# Patient Record
Sex: MALE | Race: WHITE | NOT HISPANIC OR LATINO | Employment: FULL TIME | URBAN - METROPOLITAN AREA
[De-identification: names, ages, dates, MRNs, and addresses within clinical notes are randomized per-mention and may not be internally consistent; named-entity substitution may affect disease eponyms.]

---

## 2017-01-09 DIAGNOSIS — I10 ESSENTIAL HYPERTENSION: Primary | ICD-10-CM

## 2017-01-09 RX ORDER — PROPRANOLOL HYDROCHLORIDE 40 MG/1
TABLET ORAL
Qty: 90 TABLET | Refills: 0 | Status: SHIPPED | OUTPATIENT
Start: 2017-01-09 | End: 2017-04-10

## 2017-01-09 NOTE — TELEPHONE ENCOUNTER
Medication is being filled for 1 time refill only due to:  Patient needs to be seen because at last appt MD wanted to see pt for his EPP and to adjust BP meds, etc for his uncontrolled HTN.  Has an appt for 3/2/17 .

## 2017-01-10 ENCOUNTER — TRANSFERRED RECORDS (OUTPATIENT)
Dept: HEALTH INFORMATION MANAGEMENT | Facility: CLINIC | Age: 67
End: 2017-01-10

## 2017-02-17 NOTE — PATIENT INSTRUCTIONS
Preventive Health Recommendations  Male Ages 50 - 64    Yearly exam:             See your health care provider every year in order to  o   Review health changes.   o   Discuss preventive care.    o   Review your medicines if your doctor has prescribed any.     Have a cholesterol test every 5 years, or more frequently if you are at risk for high cholesterol/heart disease.     Have a diabetes test (fasting glucose) every three years. If you are at risk for diabetes, you should have this test more often.     Have a colonoscopy at age 50, or have a yearly FIT test (stool test). These exams will check for colon cancer.      Talk with your health care provider about whether or not a prostate cancer screening test (PSA) is right for you.    You should be tested each year for STDs (sexually transmitted diseases), if you re at risk.     Shots: Get a flu shot each year. Get a tetanus shot every 10 years.     Nutrition:    Eat at least 5 servings of fruits and vegetables daily.     Eat whole-grain bread, whole-wheat pasta and brown rice instead of white grains and rice.     Talk to your provider about Calcium and Vitamin D.     Lifestyle    Exercise for at least 150 minutes a week (30 minutes a day, 5 days a week). This will help you control your weight and prevent disease.     Limit alcohol to one drink per day.     No smoking.     Wear sunscreen to prevent skin cancer.     See your dentist every six months for an exam and cleaning.     See your eye doctor every 1 to 2 years.

## 2017-02-24 DIAGNOSIS — I10 ESSENTIAL HYPERTENSION: ICD-10-CM

## 2017-02-24 RX ORDER — HYDROCHLOROTHIAZIDE 12.5 MG/1
12.5 CAPSULE ORAL DAILY
Qty: 30 CAPSULE | Refills: 0 | Status: SHIPPED | OUTPATIENT
Start: 2017-02-24 | End: 2017-03-22

## 2017-03-02 ENCOUNTER — OFFICE VISIT (OUTPATIENT)
Dept: FAMILY MEDICINE | Facility: CLINIC | Age: 67
End: 2017-03-02

## 2017-03-02 VITALS
TEMPERATURE: 97.9 F | HEIGHT: 68 IN | SYSTOLIC BLOOD PRESSURE: 147 MMHG | OXYGEN SATURATION: 96 % | DIASTOLIC BLOOD PRESSURE: 88 MMHG | BODY MASS INDEX: 40.43 KG/M2 | WEIGHT: 266.75 LBS | HEART RATE: 57 BPM

## 2017-03-02 DIAGNOSIS — Z00.00 PREVENTATIVE HEALTH CARE: Primary | ICD-10-CM

## 2017-03-02 DIAGNOSIS — Z11.59 NEED FOR HEPATITIS C SCREENING TEST: ICD-10-CM

## 2017-03-02 DIAGNOSIS — G25.81 RESTLESS LEGS SYNDROME (RLS): ICD-10-CM

## 2017-03-02 DIAGNOSIS — Z13.21 ENCOUNTER FOR VITAMIN DEFICIENCY SCREENING: ICD-10-CM

## 2017-03-02 DIAGNOSIS — I10 ESSENTIAL HYPERTENSION, BENIGN: ICD-10-CM

## 2017-03-02 DIAGNOSIS — Z12.5 SCREENING FOR PROSTATE CANCER: ICD-10-CM

## 2017-03-02 DIAGNOSIS — R73.09 ELEVATED GLUCOSE: ICD-10-CM

## 2017-03-02 DIAGNOSIS — Z13.220 SCREENING CHOLESTEROL LEVEL: ICD-10-CM

## 2017-03-02 LAB
BUN SERPL-MCNC: 13 MG/DL (ref 7–30)
CALCIUM SERPL-MCNC: 9.1 MG/DL (ref 8.5–10.4)
CHLORIDE SERPLBLD-SCNC: 100 MMOL/L (ref 94–109)
CHOLEST SERPL-MCNC: 170 MG/DL (ref 0–200)
CHOLEST/HDLC SERPL: 4.1 {RATIO} (ref 0–5)
CO2 SERPL-SCNC: 29 MMOL/L (ref 20–32)
CREAT SERPL-MCNC: 0.8 MG/DL (ref 0.8–1.5)
DEPRECATED CALCIDIOL+CALCIFEROL SERPL-MC: 38 UG/L (ref 20–75)
EGFR CALCULATED (BLACK REFERENCE): 125.2
EGFR CALCULATED (NON BLACK REFERENCE): 103.4
FASTING SPECIMEN: YES
FERRITIN SERPL-MCNC: 238 NG/ML (ref 26–388)
GLUCOSE SERPL-MCNC: 121 MG/DL (ref 60–109)
HBA1C MFR BLD: 5.9 % (ref 4.1–5.7)
HCV AB SERPL QL IA: NORMAL
HDLC SERPL-MCNC: 42 MG/DL
LDLC SERPL CALC-MCNC: 99 MG/DL (ref 0–129)
POTASSIUM SERPL-SCNC: 3.6 MMOL/L (ref 3.4–5.3)
PSA SERPL-ACNC: 1.09 UG/L (ref 0–4)
SODIUM SERPL-SCNC: 135 MMOL/L (ref 133–144)
TRIGL SERPL-MCNC: 149 MG/DL (ref 0–150)
VLDL-CHOLESTEROL: 30 (ref 7–32)

## 2017-03-02 ASSESSMENT — PAIN SCALES - GENERAL: PAINLEVEL: SEVERE PAIN (6)

## 2017-03-02 NOTE — PROGRESS NOTES
"CHIEF COMPLAINT:  Physical exam.      HISTORY OF PRESENT ILLNESS:  Dev is a 66-year-old male (turning 67 tomorrow) here for the above.  Overall, he has been doing fairly well.  He has had a workup performed over the last year or so and has some narcolepsy and he is on Provigil.  He is no longer getting drowsy while he is driving and he is finding that at work he is doing much better than he has as well.  He has obstructive sleep apnea and [facility].  He has what sounds like some restless legs symptoms and that his limbs can move and he finds it hard to get into a comfortable position.  I will check a ferritin level just to make sure that that is not contributing.      There were a few other things he wanted to review.  He slipped on the ice a few weeks ago.  He landed on his back.  He still has some pain on the right side near the shoulder blade.  He has had massage therapy and he has been to his chiropractor.  At its worst, the pain is about a 7/10.  Now, it is much better than that but he wanted to mention it to me.      He has a rash near his cheeks and he has a steroid cream at home that he would like to use (hydrocortisone 0.2% cream) and that should be fine.  He has never had a history of rosacea.  I will describe my findings below.      He has a rash in his groin and he suspects it is jock itch.  He had a cream that he bought in [place].  That does not seem to be doing the trick.  Therefore, he is buying something over-the-counter here before going to a prescription medication.      Finally, his wife is concerned about his toenails.  She thinks they look gross.  I will describe my findings below.      HEALTHCARE MAINTENANCE:  He has been going to Ophthalmology and has his eyes checked.  He has been having some slight visual changes, often when he leaves work.  They are very fleeting in nature.  He can have a bit of a \"parentheses\" in the peripheral vision just for " "a second or so before it disappears.  There has been no sign of any retinal tears.  He also sees occasional \"flashes of light,\" and again, there is no evidence of a retinal tear of any kind.  If this ever persists, he let me know and we could certainly do some brain imaging.  His hearing is unchanged.  He qualifies for hearing aids but does not have them.  He knows he does not want to wear them.  I will check his ears for wax today.  Dental care is up to date.  Blood pressure 145/87 initially and went to 148/93 with subsequent check.  He is on hydrochlorothiazide 12.5 mg daily along with Inderal (propranolol) 40 mg daily.  He will continue with that for now but we might need to increase one or both of those medications at some point.  His weight is down intentionally from 269 to 266 over the last 3 months.  He has been drinking less alcohol.  He is on the Provigil (stimulant) so all of these things might be playing into it just a little bit.        REVIEW OF SYSTEMS:  A 10-point review of systems is negative other than the things mentioned above.  Labs pending.  He is up to date with a colonoscopy.  We will do 1-time hepatitis C antibody screen today.      OBJECTIVE:     GENERAL:  Dev is in absolutely no distress.  Breathing comfortably.  He is alert and oriented x3.  Affect upbeat.     VITAL SIGNS:  BP is 145/87 with a pulse of 57 and regular.  Temperature is 97.9.  He is 5 feet 8.3 inches in height and weighs 266 with a BMI of 40.24.  O2 sats are 96% on room air.   HEENT:  Head is normocephalic, atraumatic.  Ears are free of any cerumen.  The TMs look fine.  There is no pain with palpation of the frontal or maxillary sinuses.  Eyes are grossly normal.  Nose is free of any congestion or discharge.  Teeth in good repair.  Mucous membranes are moist.  Throat looks benign.   NECK:  Supple without adenopathy or thyromegaly.  No carotid bruits are heard, no JVD.   BACK:  Smooth and straight.  No pain to percussion.  " "No CVA tenderness.   LUNGS:  Clear to auscultation bilaterally.   HEART:  Regular rate and rhythm without murmur.   ABDOMEN:  Benign.   EXTREMITIES:  Ankles are free of any edema.   SKIN:  Warm and dry.      LABORATORY DATA:  Labs will include vitamin D, PSA 50, lipid panel, hepatitis C antibody, ferritin and a basic metabolic panel.  He will be notified of all those results.      ASSESSMENT AND PLAN:   1.  Adult physical exam, up to date with healthcare maintenance strategies.   2.  Labs have been drawn and the results are pending.  He will be notified of those.     3.  Essential hypertension, benign.  Borderline control.  We might need to increase his medications at some point.   4.  Restless legs-like symptoms.  Ferritin level pending.   5.  Screening for vitamin D deficiency at his request.   6.  One-time hepatitis C antibody screening.   7.  Examination of his back reveals no swelling or ecchymosis where he had the pain in the medial aspect of his right scapula.  Ongoing massage and chiropractic therapy should be fine.   8.  Really no rash seen on his cheeks.  He can use some hydrocortisone 0.2% cream as needed.   9.  Not described above but he does have a classic tinea cruris.  He can certainly try some over-the-counter antifungal \"jock itch\" medication like Tinactin.  If that does not work, we can go with ketoconazole 2% cream or even oral medication.   10.  Toenails really look fine.  The feet are pink.  He has some changes to the great toenails bilaterally.  However, we weighed the pros and cons of going on oral antifungal medication and he would like to avoid that for now.  Should he want to do that down the road, I would be happy to help him with that.  Call with any problems or questions.       "

## 2017-03-02 NOTE — NURSING NOTE
"66 year old  Chief Complaint   Patient presents with     Physical       Blood pressure 145/87, pulse 57, temperature 97.9  F (36.6  C), temperature source Oral, height 5' 8.27\" (173.4 cm), weight 266 lb 12 oz (121 kg), SpO2 96 %. Body mass index is 40.24 kg/(m^2).  Patient Active Problem List   Diagnosis     Hypertension     Obstructive sleep apnea     Preventative health care       Wt Readings from Last 2 Encounters:   03/02/17 266 lb 12 oz (121 kg)   12/14/16 269 lb (122 kg)     BP Readings from Last 3 Encounters:   03/02/17 145/87   12/14/16 (!) 153/91   04/15/16 150/88         Current Outpatient Prescriptions   Medication     hydrochlorothiazide (MICROZIDE) 12.5 MG capsule     propranolol (INDERAL) 40 MG tablet     omeprazole (PRILOSEC) 20 MG capsule     modafinil (PROVIGIL) 100 MG tablet     hydrocortisone (WESTCORT) 0.2 % cream     No current facility-administered medications for this visit.        Social History   Substance Use Topics     Smoking status: Current Some Day Smoker     Years: 8.00     Types: Cigars     Start date: 6/15/2008     Smokeless tobacco: Never Used     Alcohol use Yes      Comment: 3 cigars/week       Health Maintenance Due   Topic Date Due     ADVANCE DIRECTIVE PLANNING Q5 YRS (NO INBASKET)  03/03/1970     HEPATITIS C SCREENING  03/03/1970       No results found for: PAP      March 2, 2017 8:07 AM  "

## 2017-03-02 NOTE — MR AVS SNAPSHOT
After Visit Summary   3/2/2017    Dev Martinez    MRN: [id number]           Patient Information     Date Of Birth          1950        Visit Information        Provider Department      3/2/2017 8:00 AM Behzad Pisano MD [facility]        Today's Diagnoses     Preventative health care    -  1    Need for hepatitis C screening test        Screening cholesterol level        Screening for prostate cancer        Essential hypertension, benign        Restless legs syndrome (RLS)        Encounter for vitamin deficiency screening        Elevated glucose          Care Instructions      Preventive Health Recommendations  Male Ages 50 - 64    Yearly exam:             See your health care provider every year in order to  o   Review health changes.   o   Discuss preventive care.    o   Review your medicines if your doctor has prescribed any.     Have a cholesterol test every 5 years, or more frequently if you are at risk for high cholesterol/heart disease.     Have a diabetes test (fasting glucose) every three years. If you are at risk for diabetes, you should have this test more often.     Have a colonoscopy at age 50, or have a yearly FIT test (stool test). These exams will check for colon cancer.      Talk with your health care provider about whether or not a prostate cancer screening test (PSA) is right for you.    You should be tested each year for STDs (sexually transmitted diseases), if you re at risk.     Shots: Get a flu shot each year. Get a tetanus shot every 10 years.     Nutrition:    Eat at least 5 servings of fruits and vegetables daily.     Eat whole-grain bread, whole-wheat pasta and brown rice instead of white grains and rice.     Talk to your provider about Calcium and Vitamin D.     Lifestyle    Exercise for at least 150 minutes a week (30 minutes a day, 5 days a week). This will help you control your weight and prevent disease.     Limit alcohol to one drink per day.     No  "smoking.     Wear sunscreen to prevent skin cancer.     See your dentist every six months for an exam and cleaning.     See your eye doctor every 1 to 2 years.          Follow-ups after your visit        Who to contact     Please call your clinic at [phone] to:    Ask questions about your health    Make or cancel appointments    Discuss your medicines    Learn about your test results    Speak to your doctor   If you have compliments or concerns about an experience at your clinic, or if you wish to file a complaint, please contact [facility] Patient Relations at [phone] or email us at [email or web address]         Additional Information About Your Visit        [employer] Information     [employer] gives you secure access to your electronic health record. If you see a primary care provider, you can also send messages to your care team and make appointments. If you have questions, please call your primary care clinic.  If you do not have a primary care provider, please call [phone] and they will assist you.      [employer] is an electronic gateway that provides easy, online access to your medical records. With [employer], you can request a clinic appointment, read your test results, renew a prescription or communicate with your care team.     To access your existing account, please contact your [facility] or call [phone] for assistance.        Care EveryWhere ID     This is your Care EveryWhere ID. This could be used by other organizations to access your [place] medical records  [id number]        Your Vitals Were     Pulse Temperature Height Pulse Oximetry BMI (Body Mass Index)       57 97.9  F (36.6  C) (Oral) 5' 8.27\" (173.4 cm) 96% 40.24 kg/m2        Blood Pressure from Last 3 Encounters:   03/02/17 147/88   12/14/16 (!) 153/91   04/15/16 150/88    Weight from Last 3 Encounters:   03/02/17 266 lb 12 oz (121 kg)   12/14/16 269 lb (122 kg) "   04/15/16 262 lb (118.8 kg)              We Performed the Following     Basic Metabolic Panel ([facility])     Ferritin     Hemoglobin A1c ([place])     Hepatitis C Screen Reflex to HCV RNA Quant and Genotype     Lipid Panel ([facility])     Prostate spec antigen screen     Vitamin D Deficiency        Primary Care Provider Office Phone # Fax #    Behazd Pisano [phone]       [place] [id number] [place] [id number]        Thank you!     Thank you for choosing [facility]  for your care. Our goal is always to provide you with excellent care. Hearing back from our patients is one way we can continue to improve our services. Please take a few minutes to complete the written survey that you may receive in the mail after your visit with us. Thank you!             Your Updated Medication List - Protect others around you: Learn how to safely use, store and throw away your medicines at www.disposemymeds.org.          This list is accurate as of: 3/2/17 11:10 AM.  Always use your most recent med list.                   Brand Name Dispense Instructions for use    hydrochlorothiazide 12.5 MG capsule    MICROZIDE    30 capsule    Take 1 capsule (12.5 mg) by mouth daily Keep upcoming appt and need for labs for further refills       hydrocortisone 0.2 % cream    WESTCORT    30 g    Apply sparingly to affected area three times daily for 14 days.       modafinil 100 MG tablet    PROVIGIL     Take 100 mg by mouth daily       omeprazole 20 MG CR capsule    priLOSEC    90 capsule    Take 1 capsule (20 mg) by mouth daily       propranolol 40 MG tablet    INDERAL    90 tablet    Take one-half tab in the AM and one-half tab in the PM.  Keep upcoming appt in March for further refills

## 2017-03-22 DIAGNOSIS — I10 ESSENTIAL HYPERTENSION: ICD-10-CM

## 2017-03-22 RX ORDER — HYDROCHLOROTHIAZIDE 12.5 MG/1
12.5 CAPSULE ORAL DAILY
Qty: 90 CAPSULE | Refills: 3 | Status: SHIPPED | OUTPATIENT
Start: 2017-03-22 | End: 2018-03-02

## 2017-03-22 NOTE — TELEPHONE ENCOUNTER
Prescription approved per [facility] Refill Protocol.  Per Aliya visit note, to stay the course for now with BP meds, with borderline control.

## 2017-04-10 DIAGNOSIS — I10 ESSENTIAL HYPERTENSION: ICD-10-CM

## 2017-04-10 RX ORDER — PROPRANOLOL HYDROCHLORIDE 40 MG/1
TABLET ORAL
Qty: 90 TABLET | Refills: 3 | Status: SHIPPED | OUTPATIENT
Start: 2017-04-10 | End: 2018-04-02

## 2017-05-22 DIAGNOSIS — K21.9 GASTROESOPHAGEAL REFLUX DISEASE WITHOUT ESOPHAGITIS: ICD-10-CM

## 2017-07-13 ENCOUNTER — OFFICE VISIT (OUTPATIENT)
Dept: FAMILY MEDICINE | Facility: CLINIC | Age: 67
End: 2017-07-13

## 2017-07-13 VITALS
HEART RATE: 60 BPM | HEIGHT: 68 IN | DIASTOLIC BLOOD PRESSURE: 80 MMHG | TEMPERATURE: 98.2 F | OXYGEN SATURATION: 97 % | BODY MASS INDEX: 41.22 KG/M2 | SYSTOLIC BLOOD PRESSURE: 144 MMHG | WEIGHT: 272 LBS

## 2017-07-13 DIAGNOSIS — I10 ESSENTIAL HYPERTENSION: ICD-10-CM

## 2017-07-13 DIAGNOSIS — E66.09 NON MORBID OBESITY DUE TO EXCESS CALORIES: ICD-10-CM

## 2017-07-13 DIAGNOSIS — R73.03 PRE-DIABETES: ICD-10-CM

## 2017-07-13 DIAGNOSIS — H90.3 BILATERAL SENSORINEURAL HEARING LOSS: Primary | ICD-10-CM

## 2017-07-13 ASSESSMENT — PAIN SCALES - GENERAL: PAINLEVEL: NO PAIN (0)

## 2017-07-13 NOTE — MR AVS SNAPSHOT
After Visit Summary   7/13/2017    Dev Martinez    MRN: [id number]           Patient Information     Date Of Birth          1950        Visit Information        Provider Department      7/13/2017 10:00 AM Behzad Pisano MD [facility]        Today's Diagnoses     Bilateral sensorineural hearing loss    -  1    Non morbid obesity due to excess calories        Pre-diabetes           Follow-ups after your visit        Additional Services     AUDIOLOGY ADULT REFERRAL       Your provider has referred you to: [facility]: Audiology and Aural Rehab Services - [place] [phone]   [email or web address]    Specialty Testing:  Audiogram Only            NUTRITION REFERRAL       Your provider has referred you to: OTHER PROVIDERS: Jade Haas    Please be aware that coverage of these services is subject to the terms and limitations of your health insurance plan.  Call member services at your health plan with any benefit or coverage questions.      Please bring the following with you to your appointment:    (1) This referral request  (2) Any documents given to you regarding this referral  (3) Any specific questions you have about diet and/or food choices                  Your next 10 appointments already scheduled     Aug 28, 2017  2:20 PM CDT   [facility] with Jade Haas RD   [facility] ([facility])    [place] [address]   [phone]              Who to contact     Please call your clinic at [phone] to:    Ask questions about your health    Make or cancel appointments    Discuss your medicines    Learn about your test results    Speak to your doctor   If you have compliments or concerns about an experience at your clinic, or if you wish to file a complaint, please contact [facility] Patient Relations at [phone] or  "email us at [email or web address]         Additional Information About Your Visit        [employer] Information     [employer] gives you secure access to your electronic health record. If you see a primary care provider, you can also send messages to your care team and make appointments. If you have questions, please call your primary care clinic.  If you do not have a primary care provider, please call [phone] and they will assist you.      [employer] is an electronic gateway that provides easy, online access to your medical records. With [employer], you can request a clinic appointment, read your test results, renew a prescription or communicate with your care team.     To access your existing account, please contact your [facility] or call [phone] for assistance.        Care EveryWhere ID     This is your Care EveryWhere ID. This could be used by other organizations to access your [place] medical records  [id number]        Your Vitals Were     Pulse Temperature Height Pulse Oximetry BMI (Body Mass Index)       60 98.2  F (36.8  C) 5' 8.27\" (173.4 cm) 97% 41.03 kg/m2        Blood Pressure from Last 3 Encounters:   07/13/17 144/80   03/02/17 147/88   12/14/16 (!) 153/91    Weight from Last 3 Encounters:   07/13/17 272 lb (123.4 kg)   03/02/17 266 lb 12 oz (121 kg)   12/14/16 269 lb (122 kg)              We Performed the Following     AUDIOLOGY ADULT REFERRAL     NUTRITION REFERRAL        Primary Care Provider Office Phone # Fax #    Behzad Pisano [phone]       [address] [address]        Equal Access to Services     TULIO LU : Hadii yaritza rosado Sofariba, waaxda luqadaha, qaybta kaalkat lara. So [facility] [phone].    ATENCIÓN: Si habla español, tiene a prasad disposición servicios gratuitos de asistencia lingüística. Krish shepherd [phone].    We comply with " applicable federal civil rights laws and [place] laws. We do not discriminate on the basis of race, color, national origin, age, disability sex, sexual orientation or gender identity.            Thank you!     Thank you for choosing [facility]  for your care. Our goal is always to provide you with excellent care. Hearing back from our patients is one way we can continue to improve our services. Please take a few minutes to complete the written survey that you may receive in the mail after your visit with us. Thank you!             Your Updated Medication List - Protect others around you: Learn how to safely use, store and throw away your medicines at www.disposemymeds.org.          This list is accurate as of: 7/13/17 11:22 AM.  Always use your most recent med list.                   Brand Name Dispense Instructions for use Diagnosis    hydrochlorothiazide 12.5 MG capsule    MICROZIDE    90 capsule    Take 1 capsule (12.5 mg) by mouth daily    Essential hypertension       hydrocortisone 0.2 % cream    WESTCORT    30 g    Apply sparingly to affected area three times daily for 14 days.    Facial rash       modafinil 100 MG tablet    PROVIGIL     Take 100 mg by mouth daily        omeprazole 20 MG CR capsule    priLOSEC    90 capsule    Take 1 capsule (20 mg) by mouth daily    Gastroesophageal reflux disease without esophagitis       propranolol 40 MG tablet    INDERAL    90 tablet    Take one-half tab in the AM and one-half tab in the PM.    Essential hypertension

## 2017-07-13 NOTE — NURSING NOTE
"67 year old  Chief Complaint   Patient presents with     Hearing Problem     on both ears. Wondering if he needs hearing aid.       Blood pressure 154/90, pulse 60, temperature 98.2  F (36.8  C), height 5' 8.27\" (173.4 cm), weight 272 lb (123.4 kg), SpO2 97 %. Body mass index is 41.03 kg/(m^2).  Patient Active Problem List   Diagnosis     Hypertension     Obstructive sleep apnea     Preventative health care       Wt Readings from Last 2 Encounters:   07/13/17 272 lb (123.4 kg)   03/02/17 266 lb 12 oz (121 kg)     BP Readings from Last 3 Encounters:   07/13/17 154/90   03/02/17 147/88   12/14/16 (!) 153/91         Current Outpatient Prescriptions   Medication     omeprazole (PRILOSEC) 20 MG CR capsule     propranolol (INDERAL) 40 MG tablet     hydrochlorothiazide (MICROZIDE) 12.5 MG capsule     modafinil (PROVIGIL) 100 MG tablet     hydrocortisone (WESTCORT) 0.2 % cream     No current facility-administered medications for this visit.        Social History   Substance Use Topics     Smoking status: Current Some Day Smoker     Years: 8.00     Types: Cigars     Start date: 6/15/2008     Smokeless tobacco: Never Used     Alcohol use Yes      Comment: 3 cigars/week       Health Maintenance Due   Topic Date Due     ADVANCE DIRECTIVE PLANNING Q5 YRS  03/03/1970     FALL RISK ASSESSMENT  03/03/2017     PNEUMOCOCCAL (1 of 2 - PCV13) 03/03/2017     AORTIC ANEURYSM SCREENING (SYSTEM ASSIGNED)  03/03/2017       No results found for: KRISTINE Ferguson CMA  July 13, 2017 10:00 AM  "

## 2017-07-19 NOTE — PROGRESS NOTES
"  CHIEF COMPLAINT:  Hearing loss.    HISTORY OF PRESENT ILLNESS:  Dev Martinez is a 67-year-old gentleman here today to review his hearing situation.  We had addressed this 2 years ago and things have gotten a bit worse.  At the time, he was really not willing to do a hearing aid, but he is much more open to that now.  The overall process has been one of gradual worsening.  He is saying \"what?\" a lot more than he used to.  His wife is certainly pointing this out to him.      In addition to that, he has \"slight forgetfulness\" and some other \"oddities.\"  For example, he has a 7-day pill container and he takes 4 medications per day.  He will fill them, but then as he looks at them it does not really make sense.  He has to really think about how many are in each slot.  Before, this was automatic and he could glance at it without having to think about it too much.    His weight is up and that is the other thing he wanted to mention.  He is not as active as he once was.  He is open to meeting with a nutritionist and will be happy to meet with Jade Haas in our clinic.  Active medical problems include essential hypertension, obstructive sleep apnea, and he has been quite good about coming in for routine preventive care.  Current medications reviewed.  Habits and family history reviewed.    OBJECTIVE:  Dev is in no distress.  Affect is upbeat.  BP initially is 154/90 but upon recheck it dropped down to 144/80.  Weight loss will certainly help in this regard.  Pulse is 60 and regular.  Temperature is 98.2.  He is 5 feet 8.3 inches in height and weighs 272 with a BMI of 41.03.  O2 sats are 97% on room air.  Examination of the ears reveals no wax.  Eardrums look fine.  I went back in the chart and reviewed previous workup that was done just a little over 2 years ago.  He had high frequency sensorineural hearing loss, but there was a bit of a discrepancy from left to the right ear.  Therefore, MRI was ordered which showed no " evidence of an acoustic neuroma.  Recommendation was to consider hearing aids and have annual hearing reevaluation.  No other physical exam performed today.    ASSESSMENT AND PLAN:    1.  Bilateral hearing loss, mild asymmetry noted.  Worse over the last 2 years.  Audiology appointment obtained 2 years ago and followed through.  MRI was obtained.  No acoustic neuroma.  Therefore, we are simply going to recommend that he go back to Audiology, have another hearing exam, and then get referrals to clinics that can provide with hearing aids.  He is ready for that and is willing to do that now.   2.  Weight gain, morbid obesity, some forgetfulness and other issues.  I am going to recommend a Nutrition consultation as this will certainly help him in many regards.  3.  Elevated blood pressure today indicating mostly controlled hypertension.  I would love to see the systolic less than 140.  Losing some weight and becoming more physically active should help us to get to that point.  4.  He will call with any problems or questions.        Behzad Pisano MD    D:  07/13/2017 18:52 T:  07/18/2017 21:07  Document:  [id number] \

## 2017-08-09 ENCOUNTER — DOCUMENTATION ONLY (OUTPATIENT)
Dept: VASCULAR SURGERY | Facility: CLINIC | Age: 67
End: 2017-08-09

## 2017-08-09 DIAGNOSIS — Z72.0 CURRENT TOBACCO USE: Primary | ICD-10-CM

## 2017-08-09 DIAGNOSIS — Z13.6 ENCOUNTER FOR ABDOMINAL AORTIC ANEURYSM (AAA) SCREENING: Primary | ICD-10-CM

## 2017-08-28 ENCOUNTER — OFFICE VISIT (OUTPATIENT)
Dept: FAMILY MEDICINE | Facility: CLINIC | Age: 67
End: 2017-08-28

## 2017-08-28 DIAGNOSIS — Z71.3 DIETARY COUNSELING: Primary | ICD-10-CM

## 2017-08-28 DIAGNOSIS — E66.9 OBESITY, UNSPECIFIED: ICD-10-CM

## 2017-08-28 NOTE — MR AVS SNAPSHOT
After Visit Summary   8/28/2017    Dev Martinez    MRN: [id number]           Patient Information     Date Of Birth          1950        Visit Information        Provider Department      8/28/2017 2:20 PM Jade Haas RD [facility]        Today's Diagnoses     Dietary counseling    -  1    Obesity, unspecified           Follow-ups after your visit        Your next 10 appointments already scheduled     Sep 15, 2017  5:30 PM CDT   NUTRITION VISIT with Jade Haas RD   [facility] Urology and [facility] for Prostate and Urologic Cancers ([facility])    [address] [place] [id number]   [phone]            Oct 06, 2017  5:00 PM CDT   NUTRITION VISIT with Jade Haas RD   [facility] Urology and [facility] for Prostate and Urologic Cancers ([facility])    [address] [place] [address]   [phone]            Oct 23, 2017  4:40 PM CDT   Return Nurtrition Jade with Jade Haas RD   [facility] ([facility])    [address] [address]   [phone]            Nov 06, 2017  4:40 PM CST   Return Nurtrition Jade with Jade Haas RD   [facility] ([facility])    [address]  [facility] [address]   [phone]            Nov 20, 2017  4:40 PM CST   Return Nurtrition Jade with Jade Haas RD   [facility] ([facility])    [address] [address]   [phone]              Who to contact     Please call your clinic at [phone] to:    Ask questions about your health    Make or cancel appointments    Discuss your medicines    Learn about your test results    Speak to your doctor   If you have compliments or concerns about an experience at your clinic, or if you wish to file a complaint, please contact [facility]  [place] [facility] Patient Relations at [phone] or email us at [email or web address]         Additional Information About Your Visit        Miiixhart Information     Miiixhart gives you secure access to your electronic health record. If you see a primary care provider, you can also send messages to your care team and make appointments. If you have questions, please call your primary care clinic.  If you do not have a primary care provider, please call [phone] and they will assist you.      InnoPad is an electronic gateway that provides easy, online access to your medical records. With InnoPad, you can request a clinic appointment, read your test results, renew a prescription or communicate with your care team.     To access your existing account, please contact your [facility] or call [phone] for assistance.        Care EveryWhere ID     This is your Care EveryWhere ID. This could be used by other organizations to access your [place] medical records  [id number]         Blood Pressure from Last 3 Encounters:   07/13/17 144/80   03/02/17 147/88   12/14/16 (!) 153/91    Weight from Last 3 Encounters:   07/13/17 123.4 kg (272 lb)   03/02/17 121 kg (266 lb 12 oz)   12/14/16 122 kg (269 lb)              We Performed the Following     MNT INDIVIDUAL INITIAL EA 15 MIN        Primary Care Provider Office Phone # Fax #    Behzad Pisano [phone]        [address] [place] [address]        Equal Access to Services     TULIO LU : Hadii aad ku hadasho Soomaali, waaxda luqadaha, qaybta kaalmada adeegyada, waxana garett cruz . So [facility] [phone].    ATENCIÓN: Si habla español, tiene a prasad disposición servicios gratuitos de asistencia lingüística. Llame al [phone].    We comply with applicable federal civil rights laws and [place] laws. We do not discriminate on the basis of race, color, national origin,  age, disability sex, sexual orientation or gender identity.            Thank you!     Thank you for choosing [facility]  for your care. Our goal is always to provide you with excellent care. Hearing back from our patients is one way we can continue to improve our services. Please take a few minutes to complete the written survey that you may receive in the mail after your visit with us. Thank you!             Your Updated Medication List - Protect others around you: Learn how to safely use, store and throw away your medicines at www.disposemymeds.org.          This list is accurate as of: 8/28/17  3:12 PM.  Always use your most recent med list.                   Brand Name Dispense Instructions for use Diagnosis    hydrochlorothiazide 12.5 MG capsule    MICROZIDE    90 capsule    Take 1 capsule (12.5 mg) by mouth daily    Essential hypertension       hydrocortisone 0.2 % cream    WESTCORT    30 g    Apply sparingly to affected area three times daily for 14 days.    Facial rash       modafinil 100 MG tablet    PROVIGIL     Take 100 mg by mouth daily        omeprazole 20 MG CR capsule    priLOSEC    90 capsule    Take 1 capsule (20 mg) by mouth daily    Gastroesophageal reflux disease without esophagitis       propranolol 40 MG tablet    INDERAL    90 tablet    Take one-half tab in the AM and one-half tab in the PM.    Essential hypertension

## 2017-08-28 NOTE — PROGRESS NOTES
Referring provider: Self    Dev Martinez  is a 67 year old male presents today for new nutrition consultation.  He would love to work on weight loss since he has been gaining weight gradually.  He has sedentary work.     Vitals:  There were no vitals taken for this visit.   Wt Readings from Last 4 Encounters:   07/13/17 123.4 kg (272 lb)   03/02/17 121 kg (266 lb 12 oz)   12/14/16 122 kg (269 lb)   04/15/16 118.8 kg (262 lb)       Nutrition History  Patient is on a regular  diet at home.  Recall:  Breakfast: 7:00:  dry cereal with milk or fresh scrambled eggs, 2 strips of lorenzo, cubed potatoes or breakfast sandwich on croissant or english muffin   Snack: 9:30:  1 cup of tea with milk and granola bar  Lunch: 11:30:  Lopez vegetarian, chocolate  Snack: 13:45-14:30:  Fresh cut fruit  Dinner: 17:30:  [place] turkey sandwich, [place] bread, guevara, sweet corn (2 ears) or pasta with home made sauce and 1 glass of wine  Snack: ice cream  (1/4c)   Beverages: water, tea.  ETOH (1 drink = 12 oz beer, 5 oz wine, 1.5 oz liquor): beer or wine 5-6 times per week.   Eating out: breakfast and sometimes lunch at work.     Physical Activity  Not much.  Have standing desk, but do not use it.      Time with Patient:  30 Minutes    Nutrition  DX:.   1. Dietary counseling    2. Obesity, unspecified          Nutrition Goals:   1:  Start tracking food using picture food journal  2:  Educated on execrations and also philosophy to improve his health via nutrition.     Long term goals:  1:  Initial weight loss goal to lose 5-10% weight via lifestyle changes.             Jade Haas, MS, RD, CSSD, LD  [facility]

## 2017-09-15 ENCOUNTER — ALLIED HEALTH/NURSE VISIT (OUTPATIENT)
Dept: UROLOGY | Facility: CLINIC | Age: 67
End: 2017-09-15

## 2017-09-15 DIAGNOSIS — Z71.3 DIETARY COUNSELING: Primary | ICD-10-CM

## 2017-09-15 DIAGNOSIS — E66.9 OBESITY, UNSPECIFIED: ICD-10-CM

## 2017-09-15 NOTE — MR AVS SNAPSHOT
After Visit Summary   9/15/2017    Dev Martinez    MRN: [id number]           Patient Information     Date Of Birth          1950        Visit Information        Provider Department      9/15/2017 5:30 PM Jade Haas RD [facility] Urology and [facility] for Prostate and Urologic Cancers        Today's Diagnoses     Dietary counseling    -  1    Obesity, unspecified           Follow-ups after your visit        Your next 10 appointments already scheduled     Oct 06, 2017  5:00 PM CDT   NUTRITION VISIT with RADHAMES Stevenson [facility] Urology and [facility] for Prostate and Urologic Cancers ([facility])    [address] [place] [address]  4th [facility] [address]   [phone]            Oct 23, 2017  4:40 PM CDT   Return Nurtrition Jade with Jade Haas RD   [facility] ([facility])    [address]  [facility] [address]   [phone]            Nov 06, 2017  4:40 PM CST   Return Nurtrition Jade with Jade Haas RD   [facility] ([facility])    [address], [facility] [address]  [facility] [address]   [phone]            Nov 20, 2017  4:40 PM CST   Return Nurtrition Jade with Jade Haas RD   [facility] ([facility])    [address], [facility] [address]  [facility] [address] [phone]              Who to contact     Please call your clinic at [phone] to:    Ask questions about your health    Make or cancel appointments    Discuss your medicines    Learn about your test results    Speak to your doctor   If you have compliments or concerns about an experience at your clinic, or if you wish to file a complaint, please contact [facility] Patient Relations at [phone] or email us at [email or web address]         Additional Information About Your Visit        MyChart Information     Judahhart gives you secure  access to your electronic health record. If you see a primary care provider, you can also send messages to your care team and make appointments. If you have questions, please call your primary care clinic.  If you do not have a primary care provider, please call [phone] and they will assist you.      [employer] is an electronic gateway that provides easy, online access to your medical records. With [employer], you can request a clinic appointment, read your test results, renew a prescription or communicate with your care team.     To access your existing account, please contact your [facility] or call [phone] for assistance.        Care EveryWhere ID     This is your Care EveryWhere ID. This could be used by other organizations to access your [place] medical records  [id number]         Blood Pressure from Last 3 Encounters:   07/13/17 144/80   03/02/17 147/88   12/14/16 (!) 153/91    Weight from Last 3 Encounters:   07/13/17 123.4 kg (272 lb)   03/02/17 121 kg (266 lb 12 oz)   12/14/16 122 kg (269 lb)              We Performed the Following     MNT INDIVIDUAL F/U REASSESS, EA 15 MIN        Primary Care Provider Office Phone # Fax #    Behzad Pisano [phone]       [address] [place] [address]        Equal Access to Services     [facility]: Hadii aad ku hadasho Soomaali, waaxda luqadaha, qaybta kaalmada adeegyada, waxana bajwa haycole cruz . So [facility] [phone].    ATENCIÓN: Si habla español, tiene a prasad disposición servicios gratuitos de asistencia lingüística. Llame al [phone].    We comply with applicable federal civil rights laws and [place] laws. We do not discriminate on the basis of race, color, national origin, age, disability sex, sexual orientation or gender identity.            Thank you!     Thank you for choosing [facility] UROLOGY AND [facility] FOR PROSTATE AND UROLOGIC CANCERS  for your care. Our goal is always  to provide you with excellent care. Hearing back from our patients is one way we can continue to improve our services. Please take a few minutes to complete the written survey that you may receive in the mail after your visit with us. Thank you!             Your Updated Medication List - Protect others around you: Learn how to safely use, store and throw away your medicines at www.disposemymeds.org.          This list is accurate as of: 9/15/17  6:04 PM.  Always use your most recent med list.                   Brand Name Dispense Instructions for use Diagnosis    hydrochlorothiazide 12.5 MG capsule    MICROZIDE    90 capsule    Take 1 capsule (12.5 mg) by mouth daily    Essential hypertension       hydrocortisone 0.2 % cream    WESTCORT    30 g    Apply sparingly to affected area three times daily for 14 days.    Facial rash       modafinil 100 MG tablet    PROVIGIL     Take 100 mg by mouth daily        omeprazole 20 MG CR capsule    priLOSEC    90 capsule    Take 1 capsule (20 mg) by mouth daily    Gastroesophageal reflux disease without esophagitis       propranolol 40 MG tablet    INDERAL    90 tablet    Take one-half tab in the AM and one-half tab in the PM.    Essential hypertension

## 2017-09-15 NOTE — PROGRESS NOTES
Referring provider: Self    Dev Martinez  is a 67 year old male presents today for new nutrition consultation.  He would love to work on weight loss since he has been gaining weight gradually.  He has sedentary work.     Vitals:  There were no vitals taken for this visit.   Wt Readings from Last 4 Encounters:   07/13/17 123.4 kg (272 lb)   03/02/17 121 kg (266 lb 12 oz)   12/14/16 122 kg (269 lb)   04/15/16 118.8 kg (262 lb)       Nutrition History  Patient is on a regular  diet at home.  Recall:  Breakfast: 7:30: Scrambled eggs, ham, onions, swiss cheese OR raisin bran with milk  Snack: 7:45:  1 cup mixed fruits  Lunch: 11:50:  Asian chicken (breaded) 4-6 oz, with grain topping, 1/2 cup rice, 1/2 cup mixed veggies   Snack: 14:30:  Fresh cut fruit 1 cup  Dinner: 17:30:  Spaghetti 1.5 cup cooked and meatball/mushroom sauce 1 cup, with cheese 1/8 cup parmesan, and bread 1 sl, and 1 glass wine  Snack: pumpkin pie and whip cream 1 sl  Beverages: water, tea.  ETOH (1 drink = 12 oz beer, 5 oz wine, 1.5 oz liquor): beer or wine 5-6 times per week.   Eating out: breakfast and sometimes lunch at work.     Read the meal composition handout, so he has been trying to eat more vegetables. Eats scheduled lunch from 11:00-12:30 pm. States he eats later during the weekends. States he snacks less at work. States he is trying to eat more salad at work, but typically eats asian food with rice.    Physical Activity  Not much.  Have standing desk, but do not use it.   Started standing at desk (9/15) a few times a day x30-60 mins total.   Started walking (9/15) 45 minutes a night. (Tues/Weds/Thurs this week and at work)  Time with Patient:  30 Minutes    Nutrition  DX:.   No diagnosis found.      Nutrition Goals:   1:  Continue tracking food using picture food journal  2:  Provided breakfast ideas to eat every day.   3:  Decrease grain content to one palm per meal, and increase fruit/veggie content to two fists per meal  4: Measure  weight next time    Long term goals:  1:  Initial weight loss goal to lose 5-10% weight via lifestyle changes.             Jade Haas, MS, RD, CSSD, LD  [facility]

## 2017-10-06 ENCOUNTER — ALLIED HEALTH/NURSE VISIT (OUTPATIENT)
Dept: UROLOGY | Facility: CLINIC | Age: 67
End: 2017-10-06

## 2017-10-06 VITALS — WEIGHT: 270.7 LBS | BODY MASS INDEX: 41.03 KG/M2 | HEIGHT: 68 IN

## 2017-10-06 DIAGNOSIS — Z71.3 DIETARY COUNSELING: Primary | ICD-10-CM

## 2017-10-06 DIAGNOSIS — E66.9 OBESITY, UNSPECIFIED OBESITY SEVERITY, UNSPECIFIED OBESITY TYPE: ICD-10-CM

## 2017-10-06 NOTE — MR AVS SNAPSHOT
After Visit Summary   10/6/2017    Dev Martinez    MRN: [id number]           Patient Information     Date Of Birth          1950        Visit Information        Provider Department      10/6/2017 5:00 PM Jade Haas RD [facility]rology and [facility] for Prostate and Urologic Cancers        Today's Diagnoses     Dietary counseling    -  1    Obesity, unspecified obesity severity, unspecified obesity type           Follow-ups after your visit        Your next 10 appointments already scheduled     Oct 23, 2017  4:40 PM CDT   Return Nurtrition Jade with Jade Haas RD   [facility] ([facility])    [place] [address]  [facility] [address]   [phone]            Nov 06, 2017  4:40 PM CST   Return Nurtrition Jade with Jade Haas RD   [facility] ([facility])    [facility]  [address]  [facility] [address]   [phone]            Nov 20, 2017  4:40 PM CST   Return Nurtrition Jade with Jade Haas RD   [facility] ([facility])    [facility]  [address]  [facility] [address]   [phone]              Who to contact     Please call your clinic at [phone] to:    Ask questions about your health    Make or cancel appointments    Discuss your medicines    Learn about your test results    Speak to your doctor   If you have compliments or concerns about an experience at your clinic, or if you wish to file a complaint, please contact [facility] Patient Relations at [phone] or email us at [email or web address]         Additional Information About Your Visit        MyChart Information     Night Outt gives you secure access to your electronic health record. If you see a primary care provider, you can also send messages to your care team and make appointments. If you have questions, please call your primary care clinic.   "If you do not have a primary care provider, please call [phone] and they will assist you.      MiNOWireless is an electronic gateway that provides easy, online access to your medical records. With MiNOWireless, you can request a clinic appointment, read your test results, renew a prescription or communicate with your care team.     To access your existing account, please contact your [facility] or call [phone] for assistance.        Care EveryWhere ID     This is your Care EveryWhere ID. This could be used by other organizations to access your [place] medical records  [id number]        Your Vitals Were     Height BMI (Body Mass Index)                1.734 m (5' 8.27\") 40.84 kg/m2           Blood Pressure from Last 3 Encounters:   07/13/17 144/80   03/02/17 147/88   12/14/16 (!) 153/91    Weight from Last 3 Encounters:   10/06/17 122.8 kg (270 lb 11.2 oz)   07/13/17 123.4 kg (272 lb)   03/02/17 121 kg (266 lb 12 oz)              We Performed the Following     MNT INDIVIDUAL F/U REASSESS, EA 15 MIN        Primary Care Provider Office Phone # Fax #    Behzad Pisano -[phone]       [address] [place] [address]        Equal Access to Services     LEIA LU : Hadii yaritza ku hadasho Soomaali, waaxda luqadaha, qaybta kaalmada adeegyada, kat bajwa haycole cruz . So [facility] [phone].    ATENCIÓN: Si habla español, tiene a prasad disposición servicios gratuitos de asistencia lingüística. Llame al [phone].    We comply with applicable federal civil rights laws and [place] laws. We do not discriminate on the basis of race, color, national origin, age, disability, sex, sexual orientation, or gender identity.            Thank you!     Thank you for choosing [facility] UROLOGY AND [facility] FOR PROSTATE AND UROLOGIC CANCERS  for your care. Our goal is always to provide you with excellent care. Hearing back from our patients is one way we can " continue to improve our services. Please take a few minutes to complete the written survey that you may receive in the mail after your visit with us. Thank you!             Your Updated Medication List - Protect others around you: Learn how to safely use, store and throw away your medicines at www.disposemymeds.org.          This list is accurate as of: 10/6/17 10:54 PM.  Always use your most recent med list.                   Brand Name Dispense Instructions for use Diagnosis    hydrochlorothiazide 12.5 MG capsule    MICROZIDE    90 capsule    Take 1 capsule (12.5 mg) by mouth daily    Essential hypertension       hydrocortisone 0.2 % cream    WESTCORT    30 g    Apply sparingly to affected area three times daily for 14 days.    Facial rash       modafinil 100 MG tablet    PROVIGIL     Take 100 mg by mouth daily        omeprazole 20 MG CR capsule    priLOSEC    90 capsule    Take 1 capsule (20 mg) by mouth daily    Gastroesophageal reflux disease without esophagitis       propranolol 40 MG tablet    INDERAL    90 tablet    Take one-half tab in the AM and one-half tab in the PM.    Essential hypertension

## 2017-10-06 NOTE — PROGRESS NOTES
"Referring provider: Self    Dev Martinez  is a 67 year old male presents today for follow-up nutrition consultation.   Vitals:  Ht 1.734 m (5' 8.27\")  Wt 122.8 kg (270 lb 11.2 oz)  BMI 40.84 kg/m2   Wt Readings from Last 4 Encounters:   10/06/17 122.8 kg (270 lb 11.2 oz)   07/13/17 123.4 kg (272 lb)   03/02/17 121 kg (266 lb 12 oz)   12/14/16 122 kg (269 lb)       Nutrition History  Patient is on a regular  diet at home.  Recall:  Breakfast: 7:30: Scrambled eggs, ham, onions, swiss cheese, fruit  Snack: 9:45:  scone  Lunch: 11:50:  3 chicken wings, rice (1c) and green beans.   Snack: 14:30:  Pop corn  Dinner: 17:30: 1 cup of soup  Snack: ice cream (1/4c)  Beverages: water, tea.  ETOH (1 drink = 12 oz beer, 5 oz wine, 1.5 oz liquor): beer or wine 5-6 times per week.   Eating out: breakfast and sometimes lunch at work.       Physical Activity  Walking \"not very far\".    But standing on the desk more.     Time with Patient:  30 Minutes    Nutrition  DX:.   1. Dietary counseling    2. Obesity, unspecified obesity severity, unspecified obesity type          Nutrition Goals:   1:  Continue tracking food using picture food journal  2:  Will be modifying his breakfasts and lunches to help his to lose weight.       Long term goals:  1:  Initial weight loss goal to lose 5-10% weight via lifestyle changes.             Jade Haas, MS, RD, CSSD, LD  [facility]       "

## 2017-10-23 ENCOUNTER — OFFICE VISIT (OUTPATIENT)
Dept: FAMILY MEDICINE | Facility: CLINIC | Age: 67
End: 2017-10-23

## 2017-10-23 VITALS — WEIGHT: 271 LBS | BODY MASS INDEX: 40.88 KG/M2

## 2017-10-23 DIAGNOSIS — E66.9 OBESITY: ICD-10-CM

## 2017-10-23 DIAGNOSIS — Z71.3 DIETARY COUNSELING: Primary | ICD-10-CM

## 2017-10-23 NOTE — MR AVS SNAPSHOT
After Visit Summary   10/23/2017    Dev Martinez    MRN: [id number]           Patient Information     Date Of Birth          1950        Visit Information        Provider Department      10/23/2017 4:40 PM Jade Haas RD [facility]        Today's Diagnoses     Dietary counseling    -  1    Obesity           Follow-ups after your visit        Your next 10 appointments already scheduled     Nov 06, 2017  4:40 PM CST   Return Nurtrition Jade with Jade Haas RD   [facility] ([facility])    [place] [address] [address] [address]  [facility] [address]   [phone]            Nov 20, 2017  4:40 PM CST   Return Nurtrition Jade with Jade Haas RD   [facility] ([facility])    [place] [address] [facility] [address]  [facility] [address]   [phone]              Who to contact     Please call your clinic at [phone] to:    Ask questions about your health    Make or cancel appointments    Discuss your medicines    Learn about your test results    Speak to your doctor   If you have compliments or concerns about an experience at your clinic, or if you wish to file a complaint, please contact [facility] Patient Relations at [phone] or email us at [email or web address]         Additional Information About Your Visit        MyChart Information     [employer] gives you secure access to your electronic health record. If you see a primary care provider, you can also send messages to your care team and make appointments. If you have questions, please call your primary care clinic.  If you do not have a primary care provider, please call [phone] and they will assist you.      [employer] is an electronic gateway that provides easy, online access to your medical records. With [employer], you can request a clinic appointment, read your test results, renew a prescription or communicate with your care  team.     To access your existing account, please contact your [facility] or call [phone] for assistance.        Care EveryWhere ID     This is your Care EveryWhere ID. This could be used by other organizations to access your [place] medical records  [id number]        Your Vitals Were     BMI (Body Mass Index)                   40.88 kg/m2            Blood Pressure from Last 3 Encounters:   07/13/17 144/80   03/02/17 147/88   12/14/16 (!) 153/91    Weight from Last 3 Encounters:   10/23/17 122.9 kg (271 lb)   10/06/17 122.8 kg (270 lb 11.2 oz)   07/13/17 123.4 kg (272 lb)              We Performed the Following     MNT INDIVIDUAL F/U REASSESS, EA 15 MIN        Primary Care Provider Office Phone # Fax #    Behzad Pisano [phone]       [address] [place] [address]        Equal Access to Services     [facility] : Hadii aad ku hadasho Soomaali, waaxda luqadaha, qaybta kaalmada adeegyada, waxay pepein zachary cruz . So [facility] [phone].    ATENCIÓN: Si habla español, tiene a prasad disposición servicios gratuitos de asistencia lingüística. Llame al [phone].    We comply with applicable federal civil rights laws and [place] laws. We do not discriminate on the basis of race, color, national origin, age, disability, sex, sexual orientation, or gender identity.            Thank you!     Thank you for choosing [facility]  for your care. Our goal is always to provide you with excellent care. Hearing back from our patients is one way we can continue to improve our services. Please take a few minutes to complete the written survey that you may receive in the mail after your visit with us. Thank you!             Your Updated Medication List - Protect others around you: Learn how to safely use, store and throw away your medicines at www.disposemymeds.org.          This list is accurate as of: 10/23/17  5:05 PM.  Always use your  most recent med list.                   Brand Name Dispense Instructions for use Diagnosis    hydrochlorothiazide 12.5 MG capsule    MICROZIDE    90 capsule    Take 1 capsule (12.5 mg) by mouth daily    Essential hypertension       hydrocortisone 0.2 % cream    WESTCORT    30 g    Apply sparingly to affected area three times daily for 14 days.    Facial rash       modafinil 100 MG tablet    PROVIGIL     Take 100 mg by mouth daily        omeprazole 20 MG CR capsule    priLOSEC    90 capsule    Take 1 capsule (20 mg) by mouth daily    Gastroesophageal reflux disease without esophagitis       propranolol 40 MG tablet    INDERAL    90 tablet    Take one-half tab in the AM and one-half tab in the PM.    Essential hypertension

## 2017-10-23 NOTE — PROGRESS NOTES
"Referring provider: Self    Dev LISA Martinez  is a 67 year old male presents today for follow-up nutrition consultation.   Vitals:  Wt 122.9 kg (271 lb)  BMI 40.88 kg/m2   Wt Readings from Last 4 Encounters:   10/23/17 122.9 kg (271 lb)   10/06/17 122.8 kg (270 lb 11.2 oz)   07/13/17 123.4 kg (272 lb)   03/02/17 121 kg (266 lb 12 oz)       Nutrition History  Patient is on a regular  diet at home.  Recall:  Breakfast: 7:30: Scrambled eggs, ham, onions, swiss cheese, fruit OR cereal OR fruit yogurt and granola  Snack: 9:45:  scone  Lunch: 11:50:  3 chicken wings, rice (1c) and green beans OR turkey/roast beef with sauce, green beans  Snack: 14:30:  Pop corn  Dinner: 17:30: Chicken caesar salad, \"a bunch\" french fries OR Soup with chicken sausage, carrots, rutabagas, carrots, potatoes, parsnips, corn, canoli, tomato, chicken broth OR sauteed chicken normandy with parsnips, peas, mashed potatoes  Snack: ice cream (1/4c)  Beverages: water, tea.  ETOH (1 drink = 12 oz beer, 5 oz wine, 1.5 oz liquor): beer or wine 5-6 times per week.   Eating out: breakfast and sometimes lunch at work.     States he has been trying to eat more vegetables, limit grains, and have smaller portions. States he has \"no willpower\", wants his wife to help him make healthier choices. States he hasn't been good about taking pictures lately because he forgets to take them.    Physical Activity  Walking \"not very far\".    But standing on the desk more.     10/23/17: States he is walking 45 minutes per day.       Time with Patient:  30 Minutes    Nutrition  DX:.   1. Dietary counseling    2. Obesity          Nutrition Goals:   1:  Continue tracking food using picture food journal  2:  Have at least 2 cups of veggie/fruit per meal. (Fruit for breakfast, vegetable for lunch and dinner)   3: Recommend reading willpower instinct or secrets from the eating lab.   4: Encouraged that all foods can be part of your weight loss. No foods are special, and you do " not need to completely eliminate any food from your diet.     Long term goals:  1:  Initial weight loss goal to lose 5-10% weight via lifestyle changes.             Jade Haas, MS, RD, CSSD, LD  [facility]

## 2017-11-06 ENCOUNTER — OFFICE VISIT (OUTPATIENT)
Dept: FAMILY MEDICINE | Facility: CLINIC | Age: 67
End: 2017-11-06

## 2017-11-06 VITALS — WEIGHT: 270.25 LBS | HEIGHT: 68 IN | BODY MASS INDEX: 40.96 KG/M2

## 2017-11-06 DIAGNOSIS — Z71.3 DIETARY COUNSELING: Primary | ICD-10-CM

## 2017-11-06 DIAGNOSIS — E66.9 OBESITY: ICD-10-CM

## 2017-11-06 NOTE — MR AVS SNAPSHOT
After Visit Summary   11/6/2017    Dev Martinez    MRN: [id number]           Patient Information     Date Of Birth          1950        Visit Information        Provider Department      11/6/2017 4:40 PM Jade Haas RD [facility]        Today's Diagnoses     Dietary counseling    -  1    Obesity           Follow-ups after your visit        Your next 10 appointments already scheduled     Nov 20, 2017  4:40 PM CST   Return Nurtrition Jade with Jade Haas RD   [facility] ([facility])    [address] [address]   [phone]              Who to contact     Please call your clinic at [phone] to:    Ask questions about your health    Make or cancel appointments    Discuss your medicines    Learn about your test results    Speak to your doctor   If you have compliments or concerns about an experience at your clinic, or if you wish to file a complaint, please contact [facility] Patient Relations at [phone] or email us at [email or web address]         Additional Information About Your Visit        MyChart Information     [employer] gives you secure access to your electronic health record. If you see a primary care provider, you can also send messages to your care team and make appointments. If you have questions, please call your primary care clinic.  If you do not have a primary care provider, please call [phone] and they will assist you.      [employer] is an electronic gateway that provides easy, online access to your medical records. With [employer], you can request a clinic appointment, read your test results, renew a prescription or communicate with your care team.     To access your existing account, please contact your [facility] or call [phone] for assistance.        Care EveryWhere ID     This is your Care EveryWhere ID. This could be used  "by other organizations to access your [place] medical records  [id number]        Your Vitals Were     Height BMI (Body Mass Index)                5' 8.27\" (173.4 cm) 40.77 kg/m2           Blood Pressure from Last 3 Encounters:   07/13/17 144/80   03/02/17 147/88   12/14/16 (!) 153/91    Weight from Last 3 Encounters:   11/06/17 270 lb 4 oz (122.6 kg)   10/23/17 271 lb (122.9 kg)   10/06/17 270 lb 11.2 oz (122.8 kg)              We Performed the Following     MNT INDIVIDUAL F/U REASSESS, EA 15 MIN        Primary Care Provider Office Phone # Fax #    Behzad Pisano [phone] [address] [place] [address]        Equal Access to Services     [facility] TABITHA : Hadii yaritza guerrier hadasho Sofariba, waaxda luqadaha, qaybta kaalmada adeelieyaradha, kat cruz . So [facility] [phone].    ATENCIÓN: Si habla español, tiene a prasad disposición servicios gratuitos de asistencia lingüística. Krish al [phone].    We comply with applicable federal civil rights laws and [place] laws. We do not discriminate on the basis of race, color, national origin, age, disability, sex, sexual orientation, or gender identity.            Thank you!     Thank you for choosing [facility]  for your care. Our goal is always to provide you with excellent care. Hearing back from our patients is one way we can continue to improve our services. Please take a few minutes to complete the written survey that you may receive in the mail after your visit with us. Thank you!             Your Updated Medication List - Protect others around you: Learn how to safely use, store and throw away your medicines at www.disposemymeds.org.          This list is accurate as of: 11/6/17 11:15 PM.  Always use your most recent med list.                   Brand Name Dispense Instructions for use Diagnosis    hydrochlorothiazide 12.5 MG capsule    MICROZIDE    90 capsule    Take 1 capsule (12.5 mg) by mouth " daily    Essential hypertension       hydrocortisone 0.2 % cream    WESTCORT    30 g    Apply sparingly to affected area three times daily for 14 days.    Facial rash       modafinil 100 MG tablet    PROVIGIL     Take 100 mg by mouth daily        omeprazole 20 MG CR capsule    priLOSEC    90 capsule    Take 1 capsule (20 mg) by mouth daily    Gastroesophageal reflux disease without esophagitis       propranolol 40 MG tablet    INDERAL    90 tablet    Take one-half tab in the AM and one-half tab in the PM.    Essential hypertension

## 2017-11-06 NOTE — PROGRESS NOTES
"Referring provider: Self    Dev Martinez  is a 67 year old male presents today for follow-up nutrition consultation.   He has decrease his scones intake, but still snacks.       Vitals:  Ht 5' 8.27\" (1.734 m)  Wt 270 lb 4 oz (122.6 kg)  BMI 40.77 kg/m2   Wt Readings from Last 4 Encounters:   11/06/17 270 lb 4 oz (122.6 kg)   10/23/17 271 lb (122.9 kg)   10/06/17 270 lb 11.2 oz (122.8 kg)   07/13/17 272 lb (123.4 kg)       Nutrition History  Patient is on a regular  diet at home.  Recall:  Breakfast: 7:30: Greek yogurt, granola and fruit  Snack: 9:45:  scone  Lunch: 11:50:  Soup with veggies and meat  Snack: 14:30:  Pop corn  Dinner: 17:30: Soup with veggies and meat  Snack: ice cream (1/4c)  Beverages: water, tea.  ETOH (1 drink = 12 oz beer, 5 oz wine, 1.5 oz liquor): beer or wine 5-6 times per week.   Eating out: breakfast and sometimes lunch at work.     Physical Activity  Walking decrease, but would love to work on increasing his physical activity.     Time with Patient:  30 Minutes    Nutrition  DX:.   1. Dietary counseling    2. Obesity          Nutrition Goals:   1:  Continue tracking food using picture food journal  2:  Will have scone, but it will replace one of his grains in his meals.     Long term goals:  1:  Initial weight loss goal to lose 5-10% weight via lifestyle changes.             Jade Haas, MS, RD, CSSD, LD  [facility]       "

## 2017-11-20 ENCOUNTER — OFFICE VISIT (OUTPATIENT)
Dept: FAMILY MEDICINE | Facility: CLINIC | Age: 67
End: 2017-11-20

## 2017-11-20 VITALS — BODY MASS INDEX: 40.77 KG/M2 | WEIGHT: 269 LBS | HEIGHT: 68 IN

## 2017-11-20 DIAGNOSIS — E66.9 OBESITY, UNSPECIFIED OBESITY SEVERITY, UNSPECIFIED OBESITY TYPE: ICD-10-CM

## 2017-11-20 DIAGNOSIS — Z71.3 DIETARY COUNSELING: Primary | ICD-10-CM

## 2017-11-20 NOTE — MR AVS SNAPSHOT
After Visit Summary   11/20/2017    Dev Martinez    MRN: [id number]           Patient Information     Date Of Birth          1950        Visit Information        Provider Department      11/20/2017 4:40 PM Jade Haas RD [facility]        Today's Diagnoses     Dietary counseling    -  1    Obesity, unspecified obesity severity, unspecified obesity type           Follow-ups after your visit        Your next 10 appointments already scheduled     Dec 15, 2017  5:00 PM CST   NUTRITION VISIT with Jade Haas RD   [facility] ([facility])    [address] [place] [address]  4th Floor  [facility] [address] [phone]            Edgar 15, 2018  4:20 PM CST   Return Nurtrition Jade with Jade Haas RD   [facility] ([facility])    [facility]  [address]  [facility] [address]   [phone]            Feb 05, 2018  4:40 PM CST   Return Nurtrition Jade with Jade Haas RD   [facility] ([facility])    [facility]  [address]  [facility] [address]   [phone]            Mar 05, 2018  4:40 PM CST   Return Nurtrition Jade with Jade Haas RD   [facility] ([facility])    [facility]  [address]  [facility] [address] [phone]              Who to contact     Please call your clinic at [phone] to:    Ask questions about your health    Make or cancel appointments    Discuss your medicines    Learn about your test results    Speak to your doctor   If you have compliments or concerns about an experience at your clinic, or if you wish to file a complaint, please contact [facility] Patient Relations at [phone] or email us at [email or web address]         Additional Information About Your Visit        MyChart Information     [employer] gives you  "secure access to your electronic health record. If you see a primary care provider, you can also send messages to your care team and make appointments. If you have questions, please call your primary care clinic.  If you do not have a primary care provider, please call [phone] and they will assist you.      [employer] is an electronic gateway that provides easy, online access to your medical records. With [employer], you can request a clinic appointment, read your test results, renew a prescription or communicate with your care team.     To access your existing account, please contact your [facility] or call [phone] for assistance.        Care EveryWhere ID     This is your Care EveryWhere ID. This could be used by other organizations to access your [place] medical records  [id number]        Your Vitals Were     Height BMI (Body Mass Index)                5' 8.17\" (1.731 m) 40.7 kg/m2           Blood Pressure from Last 3 Encounters:   07/13/17 144/80   03/02/17 147/88   12/14/16 (!) 153/91    Weight from Last 3 Encounters:   11/20/17 269 lb (122 kg)   11/06/17 270 lb 4 oz (122.6 kg)   10/23/17 271 lb (122.9 kg)              We Performed the Following     MNT INDIVIDUAL F/U REASSESS, EA 15 MIN        Primary Care Provider Office Phone # Fax #    Behzad Pisano [phone]       [address] [place] [address]        Equal Access to Services     TULIO LU : Hadii yaritza ku hadasho Soomaali, waaxda luqadaha, qaybta kaalmada adeegyada, kat cruz . So [facility] [phone].    ATENCIÓN: Si habla español, tiene a prasad disposición servicios gratuitos de asistencia lingüística. Llame al [phone].    We comply with applicable federal civil rights laws and [place] laws. We do not discriminate on the basis of race, color, national origin, age, disability, sex, sexual orientation, or gender identity.            Thank you!     " Thank you for choosing [facility]  for your care. Our goal is always to provide you with excellent care. Hearing back from our patients is one way we can continue to improve our services. Please take a few minutes to complete the written survey that you may receive in the mail after your visit with us. Thank you!             Your Updated Medication List - Protect others around you: Learn how to safely use, store and throw away your medicines at www.disposemymeds.org.          This list is accurate as of: 11/20/17 11:59 PM.  Always use your most recent med list.                   Brand Name Dispense Instructions for use Diagnosis    hydrochlorothiazide 12.5 MG capsule    MICROZIDE    90 capsule    Take 1 capsule (12.5 mg) by mouth daily    Essential hypertension       hydrocortisone 0.2 % cream    WESTCORT    30 g    Apply sparingly to affected area three times daily for 14 days.    Facial rash       modafinil 100 MG tablet    PROVIGIL     Take 100 mg by mouth daily        omeprazole 20 MG CR capsule    priLOSEC    90 capsule    Take 1 capsule (20 mg) by mouth daily    Gastroesophageal reflux disease without esophagitis       propranolol 40 MG tablet    INDERAL    90 tablet    Take one-half tab in the AM and one-half tab in the PM.    Essential hypertension

## 2017-11-20 NOTE — PROGRESS NOTES
"Referring provider: Self    Dev Martinez  is a 67 year old male presents today for follow-up nutrition consultation.   He has decrease his scones intake, but still believes that he is eating not based on hunger.     Vitals:  Ht 5' 8.17\" (1.731 m)  Wt 269 lb (122 kg)  BMI 40.7 kg/m2   Wt Readings from Last 4 Encounters:   11/20/17 269 lb (122 kg)   11/06/17 270 lb 4 oz (122.6 kg)   10/23/17 271 lb (122.9 kg)   10/06/17 270 lb 11.2 oz (122.8 kg)       Nutrition History  Patient is on a regular  diet at home.  Recall:  Breakfast: 7:30: Greek yogurt, granola and fruit  Snack: 9:45:  scone  Lunch: 11:50:  Soup with veggies and meat  Snack: 14:30:  Pop corn  Dinner: 17:30: Soup with veggies and meat  Snack: ice cream (1/4c)  Beverages: water, tea.  ETOH (1 drink = 12 oz beer, 5 oz wine, 1.5 oz liquor): beer or wine 5-6 times per week.   Eating out: breakfast and sometimes lunch at work.     Physical Activity  Walking decrease, but would love to work on increasing his physical activity.     Time with Patient:  30 Minutes    Nutrition  DX:.   1. Dietary counseling    2. Obesity, unspecified obesity severity, unspecified obesity type        Nutrition Goals:   1:  Continue tracking food using picture food journal  2:  Will portion half of his pastry at work and he will save the half for the next day.     Long term goals:  1:  Initial weight loss goal to lose 5-10% weight via lifestyle changes.     Jade Haas, MS, RD, CSSD, LD  [facility]       "

## 2017-12-15 ENCOUNTER — ALLIED HEALTH/NURSE VISIT (OUTPATIENT)
Dept: UROLOGY | Facility: CLINIC | Age: 67
End: 2017-12-15
Payer: COMMERCIAL

## 2017-12-15 VITALS — HEIGHT: 68 IN | BODY MASS INDEX: 40.51 KG/M2 | WEIGHT: 267.3 LBS

## 2017-12-15 DIAGNOSIS — Z71.3 DIETARY COUNSELING: Primary | ICD-10-CM

## 2017-12-15 DIAGNOSIS — E66.9 OBESITY, UNSPECIFIED OBESITY SEVERITY, UNSPECIFIED OBESITY TYPE: ICD-10-CM

## 2017-12-15 NOTE — PROGRESS NOTES
"Referring provider: Self    Dev Martinez  is a 67 year old male presents today for follow-up nutrition consultation.  He did a great job eating in his  trip to [place].     Vitals:  Ht 5' 8.15\" (1.731 m)  Wt 267 lb 4.8 oz (121.2 kg)  BMI 40.46 kg/m2   Wt Readings from Last 4 Encounters:   12/15/17 267 lb 4.8 oz (121.2 kg)   11/20/17 269 lb (122 kg)   11/06/17 270 lb 4 oz (122.6 kg)   10/23/17 271 lb (122.9 kg)       Nutrition History  Patient is on a regular  diet at home.  Recall:  7:30: Greek yogurt, granola that is grain free and fruit, nuts  9:45:  Did not have a scone whole week  11:50:  [place] and veggies and having half of the potatoes  14:30:  Pop corn not allways and taking a walk in the afternoon  17:30: grilled steak and 1/2 baked potato and green beans and wine  Snack: ice cream (1/4c)  Beverages: water, tea.  ETOH (1 drink = 12 oz beer, 5 oz wine, 1.5 oz liquor): wine 5-6 times per week    Physical Activity  Walking in [place] and standing at desk.  Sometime walk in the afternoon.   Steps per day varied from 8740-9264.    Time with Patient:  30 Minutes    Nutrition  DX:.   1. Dietary counseling    2. Obesity, unspecified obesity severity, unspecified obesity type        Nutrition Goals:   1:  5000 steps minimum of walking every day.   2:  Continue meal pattern that he established int the last month.   Long term goals:  1:  Initial weight loss goal to lose 5-10% weight via lifestyle changes.     Jade Haas, MS, RD, CSSD, LD  [facility]       "

## 2017-12-15 NOTE — MR AVS SNAPSHOT
After Visit Summary   12/15/2017    Dev Martinez    MRN: [id number]           Patient Information     Date Of Birth          1950        Visit Information        Provider Department      12/15/2017 5:00 PM Jade Haas RD [facility] and [facility] for Prostate and Urologic Cancers        Today's Diagnoses     Dietary counseling    -  1    Obesity, unspecified obesity severity, unspecified obesity type           Follow-ups after your visit        Your next 10 appointments already scheduled     Edgar 15, 2018  4:20 PM CST   Return Nurtrition Jade with Jade Haas RD   [facility] ([facility])    [facility]  [address]  [facility] [address]   [phone]            Feb 05, 2018  4:40 PM CST   Return Nurtrition Jade with Jade Haas RD   [facility] ([facility])    [facility]  [address]  [facility] [address]   [phone]            Mar 05, 2018  4:40 PM CST   Return Nurtrition Jade with Jade Haas RD   [facility] ([facility])    [facility]  [address]  [facility] [address]   [phone]              Who to contact     Please call your clinic at [phone] to:    Ask questions about your health    Make or cancel appointments    Discuss your medicines    Learn about your test results    Speak to your doctor   If you have compliments or concerns about an experience at your clinic, or if you wish to file a complaint, please contact [facility] Patient Relations at [phone] or email us at [email or web address]         Additional Information About Your Visit        MyChart Information     [employer] gives you secure access to your electronic health record. If you see a primary care provider, you can also send messages to your care team and make appointments. If you have questions, please call your primary care clinic.   "If you do not have a primary care provider, please call [phone] and they will assist you.      [employer] is an electronic gateway that provides easy, online access to your medical records. With [employer], you can request a clinic appointment, read your test results, renew a prescription or communicate with your care team.     To access your existing account, please contact your [facility] or call [phone] for assistance.        Care EveryWhere ID     This is your Care EveryWhere ID. This could be used by other organizations to access your [place] medical records  [id number]        Your Vitals Were     Height BMI (Body Mass Index)                5' 8.15\" (1.731 m) 40.46 kg/m2           Blood Pressure from Last 3 Encounters:   07/13/17 144/80   03/02/17 147/88   12/14/16 (!) 153/91    Weight from Last 3 Encounters:   12/15/17 267 lb 4.8 oz (121.2 kg)   11/20/17 269 lb (122 kg)   11/06/17 270 lb 4 oz (122.6 kg)              We Performed the Following     MNT INDIVIDUAL F/U REASSESS, EA 15 MIN        Primary Care Provider Office Phone # Fax #    Behzad Pisano [phone]       [address] [place] [address]        Equal Access to Services     [facility] TABITHA : Hadii yaritza ku hadasho Soomaali, waaxda luqadaha, qaybta kaalmada adeegyada, kat bajwa haycole cruz . So [facility] [phone].    ATENCIÓN: Si habla español, tiene a prasad disposición servicios gratuitos de asistencia lingüística. Llame al [phone].    We comply with applicable federal civil rights laws and [place] laws. We do not discriminate on the basis of race, color, national origin, age, disability, sex, sexual orientation, or gender identity.            Thank you!     Thank you for choosing [facility]OLOGY AND [facility] FOR PROSTATE AND UROLOGIC CANCERS  for your care. Our goal is always to provide you with excellent care. Hearing back from our patients is one way we can " continue to improve our services. Please take a few minutes to complete the written survey that you may receive in the mail after your visit with us. Thank you!             Your Updated Medication List - Protect others around you: Learn how to safely use, store and throw away your medicines at www.disposemymeds.org.          This list is accurate as of: 12/15/17  6:22 PM.  Always use your most recent med list.                   Brand Name Dispense Instructions for use Diagnosis    hydrochlorothiazide 12.5 MG capsule    MICROZIDE    90 capsule    Take 1 capsule (12.5 mg) by mouth daily    Essential hypertension       hydrocortisone 0.2 % cream    WESTCORT    30 g    Apply sparingly to affected area three times daily for 14 days.    Facial rash       modafinil 100 MG tablet    PROVIGIL     Take 100 mg by mouth daily        omeprazole 20 MG CR capsule    priLOSEC    90 capsule    Take 1 capsule (20 mg) by mouth daily    Gastroesophageal reflux disease without esophagitis       propranolol 40 MG tablet    INDERAL    90 tablet    Take one-half tab in the AM and one-half tab in the PM.    Essential hypertension

## 2018-01-15 ENCOUNTER — OFFICE VISIT (OUTPATIENT)
Dept: FAMILY MEDICINE | Facility: CLINIC | Age: 68
End: 2018-01-15
Payer: COMMERCIAL

## 2018-01-15 VITALS — WEIGHT: 266.08 LBS | BODY MASS INDEX: 40.28 KG/M2

## 2018-01-15 DIAGNOSIS — Z71.3 DIETARY COUNSELING: Primary | ICD-10-CM

## 2018-01-15 DIAGNOSIS — E66.9 OBESITY, UNSPECIFIED OBESITY SEVERITY, UNSPECIFIED OBESITY TYPE: ICD-10-CM

## 2018-01-15 NOTE — MR AVS SNAPSHOT
After Visit Summary   1/15/2018    Dev Martinez    MRN: [id number]           Patient Information     Date Of Birth          1950        Visit Information        Provider Department      1/15/2018 4:20 PM Jade Hasa RD [facility]        Today's Diagnoses     Dietary counseling    -  1    Obesity, unspecified obesity severity, unspecified obesity type           Follow-ups after your visit        Your next 10 appointments already scheduled     Feb 05, 2018  4:40 PM CST   Return Nurtrition Jade with Jade Haas RD   [facility] ([facility])    [place] [address] [address]  [facility] [address]   [phone]            Mar 05, 2018  4:40 PM CST   Return Nurtrition Jade with Jade Haas RD   [facility] ([facility])    [address] [facility] [address]  [facility] [address]   [phone]              Who to contact     Please call your clinic at [phone] to:    Ask questions about your health    Make or cancel appointments    Discuss your medicines    Learn about your test results    Speak to your doctor   If you have compliments or concerns about an experience at your clinic, or if you wish to file a complaint, please contact [facility] Patient Relations at [phone] or email us at [email or web address]         Additional Information About Your Visit        MyChart Information     [employer] gives you secure access to your electronic health record. If you see a primary care provider, you can also send messages to your care team and make appointments. If you have questions, please call your primary care clinic.  If you do not have a primary care provider, please call [phone] and they will assist you.      [employer] is an electronic gateway that provides easy, online access to your medical records. With [employer], you can request a clinic appointment, read your test  results, renew a prescription or communicate with your care team.     To access your existing account, please contact your [facility] or call [phone] for assistance.        Care EveryWhere ID     This is your Care EveryWhere ID. This could be used by other organizations to access your [place] medical records  [id number]        Your Vitals Were     BMI (Body Mass Index)                   40.28 kg/m2            Blood Pressure from Last 3 Encounters:   07/13/17 144/80   03/02/17 147/88   12/14/16 (!) 153/91    Weight from Last 3 Encounters:   01/15/18 120.7 kg (266 lb 1.3 oz)   12/15/17 121.2 kg (267 lb 4.8 oz)   11/20/17 122 kg (269 lb)              We Performed the Following     MNT INDIVIDUAL F/U REASSESS, EA 15 MIN        Primary Care Provider Office Phone # Fax #    Behzad Pisano [phone]       [address] [place] [address]        Equal Access to Services     TULIO LU : Hadii aad ku hadasho Soomaali, waaxda luqadaha, qaybta kaalmada adeegyada, waxay idiin hayinnan laquita cruz . So [facility] [phone].    ATENCIÓN: Si habla español, tiene a prasad disposición servicios gratuitos de asistencia lingüística. [facility] [phone].    We comply with applicable federal civil rights laws and [place] laws. We do not discriminate on the basis of race, color, national origin, age, disability, sex, sexual orientation, or gender identity.            Thank you!     Thank you for choosing [facility]  for your care. Our goal is always to provide you with excellent care. Hearing back from our patients is one way we can continue to improve our services. Please take a few minutes to complete the written survey that you may receive in the mail after your visit with us. Thank you!             Your Updated Medication List - Protect others around you: Learn how to safely use, store and throw away your medicines at www.disposemymeds.org.           This list is accurate as of: 1/15/18  4:53 PM.  Always use your most recent med list.                   Brand Name Dispense Instructions for use Diagnosis    hydrochlorothiazide 12.5 MG capsule    MICROZIDE    90 capsule    Take 1 capsule (12.5 mg) by mouth daily    Essential hypertension       hydrocortisone 0.2 % cream    WESTCORT    30 g    Apply sparingly to affected area three times daily for 14 days.    Facial rash       modafinil 100 MG tablet    PROVIGIL     Take 100 mg by mouth daily        omeprazole 20 MG CR capsule    priLOSEC    90 capsule    Take 1 capsule (20 mg) by mouth daily    Gastroesophageal reflux disease without esophagitis       propranolol 40 MG tablet    INDERAL    90 tablet    Take one-half tab in the AM and one-half tab in the PM.    Essential hypertension

## 2018-01-25 ENCOUNTER — OFFICE VISIT (OUTPATIENT)
Dept: FAMILY MEDICINE | Facility: CLINIC | Age: 68
End: 2018-01-25
Payer: COMMERCIAL

## 2018-01-25 ENCOUNTER — TRANSFERRED RECORDS (OUTPATIENT)
Dept: HEALTH INFORMATION MANAGEMENT | Facility: CLINIC | Age: 68
End: 2018-01-25

## 2018-01-25 VITALS
WEIGHT: 266.5 LBS | DIASTOLIC BLOOD PRESSURE: 89 MMHG | SYSTOLIC BLOOD PRESSURE: 165 MMHG | HEART RATE: 65 BPM | HEIGHT: 68 IN | BODY MASS INDEX: 40.39 KG/M2 | TEMPERATURE: 98.6 F | OXYGEN SATURATION: 97 %

## 2018-01-25 DIAGNOSIS — K58.9 IRRITABLE BOWEL SYNDROME, UNSPECIFIED TYPE: Primary | ICD-10-CM

## 2018-01-25 DIAGNOSIS — I44.7 LBBB (LEFT BUNDLE BRANCH BLOCK): ICD-10-CM

## 2018-01-25 DIAGNOSIS — R07.89 ATYPICAL CHEST PAIN: ICD-10-CM

## 2018-01-25 DIAGNOSIS — B35.6 TINEA CRURIS: ICD-10-CM

## 2018-01-25 LAB
BUN SERPL-MCNC: 11 MG/DL (ref 7–30)
CALCIUM SERPL-MCNC: 9.4 MG/DL (ref 8.5–10.4)
CHLORIDE SERPLBLD-SCNC: 99 MMOL/L (ref 94–109)
CO2 SERPL-SCNC: 31 MMOL/L (ref 20–32)
CREAT SERPL-MCNC: 1 MG/DL (ref 0.8–1.5)
EGFR CALCULATED (BLACK REFERENCE): 96.4
EGFR CALCULATED (NON BLACK REFERENCE): 79.7
GLUCOSE SERPL-MCNC: 110 MG/DL (ref 60–109)
POTASSIUM SERPL-SCNC: 3.6 MMOL/L (ref 3.4–5.3)
SODIUM SERPL-SCNC: 141 MMOL/L (ref 133–144)
TSH SERPL DL<=0.005 MIU/L-ACNC: 0.91 MU/L (ref 0.4–4)

## 2018-01-25 RX ORDER — KETOCONAZOLE 20 MG/G
CREAM TOPICAL 2 TIMES DAILY
Qty: 30 G | Refills: 1 | Status: SHIPPED | OUTPATIENT
Start: 2018-01-25 | End: 2019-05-01

## 2018-01-25 NOTE — MR AVS SNAPSHOT
After Visit Summary   1/25/2018    Dev Martinez    MRN: [id number]           Patient Information     Date Of Birth          1950        Visit Information        Provider Department      1/25/2018 11:00 AM Behzad Psiano MD [facility]        Today's Diagnoses     Irritable bowel syndrome, unspecified type    -  1    LBBB (left bundle branch block)        Atypical chest pain        Tinea cruris           Follow-ups after your visit        Your next 10 appointments already scheduled     Feb 05, 2018  4:40 PM CST   Return Nurtrition Jade with Jade Haas RD   [facility] ([facility])    [facility]  [address]  [facility] [address]   [phone]            Mar 05, 2018  4:40 PM CST   Return Nurtrition Jade with Jade Haas RD   [facility] ([facility])    [facility]  [address]  [facility] [address]   [phone]            Apr 11, 2018  8:00 AM CDT   PHYSICAL with Behzad Pisano MD   [facility] ([facility])    [facility]  [address]  [facility] [address]   [phone]              Future tests that were ordered for you today     Open Future Orders        Priority Expected Expires Ordered    X-ray Abdomen 1 vw Routine 1/25/2018 1/25/2019 1/25/2018            Who to contact     Please call your clinic at [phone] to:    Ask questions about your health    Make or cancel appointments    Discuss your medicines    Learn about your test results    Speak to your doctor   If you have compliments or concerns about an experience at your clinic, or if you wish to file a complaint, please contact [facility] Patient Relations at [phone] or email us at [email or web address]         Additional Information About Your Visit        MyChart Information     [employer] gives you secure access to your electronic  "health record. If you see a primary care provider, you can also send messages to your care team and make appointments. If you have questions, please call your primary care clinic.  If you do not have a primary care provider, please call [phone] and they will assist you.      [employer] is an electronic gateway that provides easy, online access to your medical records. With [employer], you can request a clinic appointment, read your test results, renew a prescription or communicate with your care team.     To access your existing account, please contact your [facility] Clinic or call [phone] for assistance.        Care EveryWhere ID     This is your Care EveryWhere ID. This could be used by other organizations to access your [place] medical records  [id number]        Your Vitals Were     Pulse Temperature Height Pulse Oximetry BMI (Body Mass Index)       65 98.6  F (37  C) (Oral) 5' 8.15\" (173.1 cm) 97% 40.34 kg/m2        Blood Pressure from Last 3 Encounters:   01/25/18 165/89   07/13/17 144/80   03/02/17 147/88    Weight from Last 3 Encounters:   01/25/18 266 lb 8 oz (120.9 kg)   01/15/18 266 lb 1.3 oz (120.7 kg)   12/15/17 267 lb 4.8 oz (121.2 kg)              We Performed the Following     Basic Metabolic Panel ([place])     EKG 12-lead complete w/read - Clinics     TSH with free T4 reflex          Today's Medication Changes          These changes are accurate as of 1/25/18 12:16 PM.  If you have any questions, ask your nurse or doctor.               Start taking these medicines.        Dose/Directions    ketoconazole 2 % cream   Commonly known as:  NIZORAL   Used for:  Tinea cruris   Started by:  Behzad Pisano MD        Apply topically 2 times daily   Quantity:  30 g   Refills:  1            Where to get your medicines      These medications were sent to [place] [address] [place] [facility] [address] [place] [address]  [address] [facility] [address]     Phone:  " [phone]     ketoconazole 2 % cream                Primary Care Provider Office Phone # Fax #    Behzad Pisano [phone]       [address] [place] [address]        Equal Access to Services     TULIO LU : Patrick guerrier alonzo Sojaspreetali, waaxda luqadaha, qaybta kaalmada adeyves, kat pepein hayaasania solomonelie allred nancy olsen. So [facility] [phone].    ATENCIÓN: Si habla español, tiene a prasad disposición servicios gratuitos de asistencia lingüística. Llame al [phone].    We comply with applicable federal civil rights laws and [place] laws. We do not discriminate on the basis of race, color, national origin, age, disability, sex, sexual orientation, or gender identity.            Thank you!     Thank you for choosing [facility]  for your care. Our goal is always to provide you with excellent care. Hearing back from our patients is one way we can continue to improve our services. Please take a few minutes to complete the written survey that you may receive in the mail after your visit with us. Thank you!             Your Updated Medication List - Protect others around you: Learn how to safely use, store and throw away your medicines at www.disposemymeds.org.          This list is accurate as of 1/25/18 12:16 PM.  Always use your most recent med list.                   Brand Name Dispense Instructions for use Diagnosis    hydrochlorothiazide 12.5 MG capsule    MICROZIDE    90 capsule    Take 1 capsule (12.5 mg) by mouth daily    Essential hypertension       hydrocortisone 0.2 % cream    WESTCORT    30 g    Apply sparingly to affected area three times daily for 14 days.    Facial rash       ketoconazole 2 % cream    NIZORAL    30 g    Apply topically 2 times daily    Tinea cruris       modafinil 100 MG tablet    PROVIGIL     Take 100 mg by mouth daily        omeprazole 20 MG CR capsule    priLOSEC    90 capsule    Take 1 capsule (20 mg) by mouth daily    Gastroesophageal  reflux disease without esophagitis       propranolol 40 MG tablet    INDERAL    90 tablet    Take one-half tab in the AM and one-half tab in the PM.    Essential hypertension

## 2018-01-25 NOTE — NURSING NOTE
"67 year old  Chief Complaint   Patient presents with     Gastric Problem     has some discomfort in his stomach pt reports he feels bloated and constipated        Blood pressure 165/89, pulse 65, temperature 98.6  F (37  C), temperature source Oral, height 5' 8.15\" (173.1 cm), weight 266 lb 8 oz (120.9 kg), SpO2 97 %. Body mass index is 40.34 kg/(m^2).  Patient Active Problem List   Diagnosis     Hypertension     Obstructive sleep apnea     Preventative health care       Wt Readings from Last 2 Encounters:   01/25/18 266 lb 8 oz (120.9 kg)   01/15/18 266 lb 1.3 oz (120.7 kg)     BP Readings from Last 3 Encounters:   01/25/18 165/89   07/13/17 144/80   03/02/17 147/88         Current Outpatient Prescriptions   Medication     omeprazole (PRILOSEC) 20 MG CR capsule     propranolol (INDERAL) 40 MG tablet     hydrochlorothiazide (MICROZIDE) 12.5 MG capsule     modafinil (PROVIGIL) 100 MG tablet     hydrocortisone (WESTCORT) 0.2 % cream     No current facility-administered medications for this visit.        Social History   Substance Use Topics     Smoking status: Current Some Day Smoker     Years: 8.00     Types: Cigars     Start date: 6/15/2008     Smokeless tobacco: Never Used     Alcohol use Yes      Comment: 3 cigars/week       Health Maintenance Due   Topic Date Due     ADVANCE DIRECTIVE PLANNING Q5 YRS  03/03/2007     PNEUMOCOCCAL (1 of 2 - PCV13) 03/03/2017     INFLUENZA VACCINE (SYSTEM ASSIGNED)  09/01/2017       No results found for: PAP      January 25, 2018 11:23 AM  "

## 2018-01-30 ENCOUNTER — RADIANT APPOINTMENT (OUTPATIENT)
Dept: GENERAL RADIOLOGY | Facility: CLINIC | Age: 68
End: 2018-01-30
Payer: COMMERCIAL

## 2018-01-30 DIAGNOSIS — K58.9 IRRITABLE BOWEL SYNDROME, UNSPECIFIED TYPE: ICD-10-CM

## 2018-02-02 NOTE — PROGRESS NOTES
"CHIEF COMPLAINT:  Abdominal discomfort.      HISTORY OF PRESENT ILLNESS:  Dev is a 67-year-old who has been feeling bloated and constipated for a couple weeks now.  He was having some cramping and decided to cut back on his caffeine consumption.  As a result, he is having less cramping.  He is trying to take Metamucil on a regular basis and that seems to be helping.  No diarrhea.  No blood in the stool.  No black tarry stool.  No unexplained weight loss.  Overall, he feels fine.  The one exception to this is that he has been having some chest pain.  This occurs at rest.  He was shoveling recently and lifting heavy amounts of snow, and he never had any discomfort with that at all.  He can feel a little bit in his neck at times.  He has had no shortness of breath.  Does not feel lightheaded or dizzy.  It is probably inaccurate to call it a \"pain, \" as it is really more of a discomfort that is hard for him to put his finger on.  He wonders how much of it might be his abdomen.  Of course, there could be a connection.      ACTIVE MEDICAL PROBLEMS:  Reviewed.      CURRENT MEDICATIONS:  Reviewed.  He is already on omeprazole 20 mg once daily.  He is also on propranolol 40 mg daily, hydrochlorothiazide 12.5 mg daily and modafinil (Provigil) for some obstructive sleep apnea/narcolepsy symptoms.      He does smoke some cigars occasionally.      OBJECTIVE:  Dev is in no distress.  BP is elevated at 165/89 with a pulse of 65.  Temperature is 98.6.  He is 5 feet 8.2 inches and weighs 266 with a BMI of 40.34.  O2 sats are 97% on room air.  Heart reveals a regular rate and rhythm without murmur.  No reproducible pain with palpation of the chest wall.  Abdomen is slightly obese.  Positive bowel sounds are quite quiet.  No pain with palpation.  No organomegaly detected.  EKG was obtained and this shows a sinus rhythm with a rate of 61 beats per minute and regular.  Absolutely no ST elevation or T-wave inversion.  No ectopy.  I " am going to go ahead and get an abdominal x-ray to see if he has unusual stool burden.  We will check a TSH just to make sure that that is not contributing in any way.  We will also check a basic metabolic panel.  He will be notified of all those results.      ASSESSMENT/PLAN:   1.  Probable irritable bowel syndrome, constipation dominant.  Continue with the Metamucil/fiber supplementation at least twice daily.  I will make sure that there is no evidence of hypothyroidism or any electrolyte abnormalities.   2.  History of left bundle branch block.  That is not present today on his EKG.  He does have a little atypical chest pain which is likely due to his abdominal discomfort.   3.  Not described above, but he does have some tinea cruris.  I am going to go ahead and treat him with some ketoconazole 2% cream.  He will be notified of the x-ray result when I get that.      Total time spent with Dev was over 25 minutes, more than 50% of that time was spent in care coordination and counseling.

## 2018-02-05 ENCOUNTER — OFFICE VISIT (OUTPATIENT)
Dept: FAMILY MEDICINE | Facility: CLINIC | Age: 68
End: 2018-02-05
Payer: COMMERCIAL

## 2018-02-05 DIAGNOSIS — Z71.3 DIETARY COUNSELING: Primary | ICD-10-CM

## 2018-02-05 DIAGNOSIS — E66.9 OBESITY, UNSPECIFIED OBESITY SEVERITY, UNSPECIFIED OBESITY TYPE: ICD-10-CM

## 2018-02-05 NOTE — MR AVS SNAPSHOT
After Visit Summary   2/5/2018    Dev Martinez    MRN: [id number]           Patient Information     Date Of Birth          1950        Visit Information        Provider Department      2/5/2018 4:40 PM Jade Haas RD [facility]        Today's Diagnoses     Dietary counseling    -  1    Obesity, unspecified obesity severity, unspecified obesity type           Follow-ups after your visit        Your next 10 appointments already scheduled     Mar 05, 2018  4:40 PM CST   Return Nurtrition Jade with Jade Haas RD   [facility] ([facility])    [facility] [address]  [facility] [address]   [phone]            Apr 09, 2018  4:20 PM CDT   Return Nurtrition Jade with Jade Haas RD   [facility] ([facility])    [place] [address] [address]  [facility] [address]   [phone]            Apr 11, 2018  8:00 AM CDT   PHYSICAL with Behzad Pisano MD   [facility] ([facility])    [place] [address] [address]  [facility] [address]   [phone]              Who to contact     Please call your clinic at [phone] to:    Ask questions about your health    Make or cancel appointments    Discuss your medicines    Learn about your test results    Speak to your doctor   If you have compliments or concerns about an experience at your clinic, or if you wish to file a complaint, please contact [facility] Patient Relations at [phone] or email us at [email or web address]         Additional Information About Your Visit        MyChart Information     [employer] gives you secure access to your electronic health record. If you see a primary care provider, you can also send messages to your care team and make appointments. If you have questions, please call your primary care clinic.  If you do not have a primary care provider, please  call [phone] and they will assist you.      [employer] is an electronic gateway that provides easy, online access to your medical records. With [employer], you can request a clinic appointment, read your test results, renew a prescription or communicate with your care team.     To access your existing account, please contact your [facility] or call [phone] for assistance.        Care EveryWhere ID     This is your Care EveryWhere ID. This could be used by other organizations to access your [facility] medical records  [id number]         Blood Pressure from Last 3 Encounters:   01/25/18 165/89   07/13/17 144/80   03/02/17 147/88    Weight from Last 3 Encounters:   01/25/18 120.9 kg (266 lb 8 oz)   01/15/18 120.7 kg (266 lb 1.3 oz)   12/15/17 121.2 kg (267 lb 4.8 oz)              We Performed the Following     MNT INDIVIDUAL F/U REASSESS, EA 15 MIN        Primary Care Provider Office Phone # Fax #    Behzad Pisano [phone]       [address] [place] [address]        Equal Access to Services     [facility]: Hadii aad ku hadasho Soomaali, waaxda luqadaha, qaybta kaalmada adeegyada, kat cruz . So [facility] [phone].    ATENCIÓN: Si habla español, tiene a prasad disposición servicios gratborisos de asistencia lingüística. Llame al [phone].    We comply with applicable federal civil rights laws and [place] laws. We do not discriminate on the basis of race, color, national origin, age, disability, sex, sexual orientation, or gender identity.            Thank you!     Thank you for choosing [facility]  for your care. Our goal is always to provide you with excellent care. Hearing back from our patients is one way we can continue to improve our services. Please take a few minutes to complete the written survey that you may receive in the mail after your visit with us. Thank you!             Your Updated Medication  List - Protect others around you: Learn how to safely use, store and throw away your medicines at www.disposemymeds.org.          This list is accurate as of 2/5/18  5:14 PM.  Always use your most recent med list.                   Brand Name Dispense Instructions for use Diagnosis    hydrochlorothiazide 12.5 MG capsule    MICROZIDE    90 capsule    Take 1 capsule (12.5 mg) by mouth daily    Essential hypertension       hydrocortisone 0.2 % cream    WESTCORT    30 g    Apply sparingly to affected area three times daily for 14 days.    Facial rash       ketoconazole 2 % cream    NIZORAL    30 g    Apply topically 2 times daily    Tinea cruris       modafinil 100 MG tablet    PROVIGIL     Take 100 mg by mouth daily        omeprazole 20 MG CR capsule    priLOSEC    90 capsule    Take 1 capsule (20 mg) by mouth daily    Gastroesophageal reflux disease without esophagitis       propranolol 40 MG tablet    INDERAL    90 tablet    Take one-half tab in the AM and one-half tab in the PM.    Essential hypertension

## 2018-02-05 NOTE — PROGRESS NOTES
Referring provider: Self    Dev Martinez  is a 67 year old male presents today for follow-up nutrition consultation. Does not feel as much hunger and is able to tell when he needs to stop eating. Feels full often and is trying to eat less, this is partially due to being constipated. He had an X-ray of his abdomen.    Vitals:  There were no vitals taken for this visit.   Wt Readings from Last 4 Encounters:   01/25/18 120.9 kg (266 lb 8 oz)   01/15/18 120.7 kg (266 lb 1.3 oz)   12/15/17 121.2 kg (267 lb 4.8 oz)   11/20/17 122 kg (269 lb)       Nutrition History  Patient is on a regular  diet at home.  Recall:   Slice of paul soda bread, 1 tbsp peanut butter   1 pint fruit (pineapple, grape, blueberry, cantaloupe), chobani yogurt, 1/2 c granola, nuts   Roast beef, sauteed onions, asparagus, green peppers, 6 sm. roasted potatoes, 1 pc. chocolate    Spinach pie, tabbouleh salad - tomatoes, onions, feta cheese and yogurt dressing.    Liquid:  750 ml water  2 cups tea  Some whiskey  Takes metamucil 2x/week  ETOH (1 drink = 12 oz beer, 5 oz wine, 1.5 oz liquor): wine 5-6 times per week      Physical Activity  15 min. Walking at work  Shoveling    Time with Patient:  30 Minutes    Nutrition  DX:.   1. Dietary counseling    2. Obesity, unspecified obesity severity, unspecified obesity type        Nutrition Goals:   1: Try and get to the pool once a week. (1/2 mile swim)  2: Try to eat 2-4 kiwis for constipation  3: Read Gianna Man's: Secrets From the Eating Lab    Long term goals:  1:  Initial weight loss goal to lose 5-10% weight via lifestyle changes.     Jade Haas, MS, RD, CSSD, LD  [facility]

## 2018-02-13 ENCOUNTER — OFFICE VISIT (OUTPATIENT)
Dept: FAMILY MEDICINE | Facility: CLINIC | Age: 68
End: 2018-02-13
Payer: COMMERCIAL

## 2018-02-13 VITALS
OXYGEN SATURATION: 98 % | WEIGHT: 266 LBS | BODY MASS INDEX: 40.32 KG/M2 | SYSTOLIC BLOOD PRESSURE: 145 MMHG | TEMPERATURE: 98.2 F | HEIGHT: 68 IN | HEART RATE: 61 BPM | DIASTOLIC BLOOD PRESSURE: 88 MMHG

## 2018-02-13 DIAGNOSIS — I10 ESSENTIAL HYPERTENSION: ICD-10-CM

## 2018-02-13 DIAGNOSIS — M10.072 ACUTE IDIOPATHIC GOUT INVOLVING TOE OF LEFT FOOT: Primary | ICD-10-CM

## 2018-02-13 PROBLEM — R73.03 PREDIABETES: Status: ACTIVE | Noted: 2018-02-13

## 2018-02-13 RX ORDER — PREDNISONE 20 MG/1
40 TABLET ORAL DAILY
Qty: 10 TABLET | Refills: 0 | Status: SHIPPED | OUTPATIENT
Start: 2018-02-13 | End: 2018-02-18

## 2018-02-13 NOTE — NURSING NOTE
"67 year old  Chief Complaint   Patient presents with     Toe Pain     left big toe is having pain pt noiced on friday       Blood pressure 160/89, pulse 61, temperature 98.2  F (36.8  C), temperature source Oral, height 5' 8.15\" (173.1 cm), weight 266 lb (120.7 kg), SpO2 98 %. Body mass index is 40.27 kg/(m^2).  Patient Active Problem List   Diagnosis     Hypertension     Obstructive sleep apnea     Prediabetes     Left bundle branch block       Wt Readings from Last 2 Encounters:   02/13/18 266 lb (120.7 kg)   01/25/18 266 lb 8 oz (120.9 kg)     BP Readings from Last 3 Encounters:   02/13/18 160/89   01/25/18 165/89   07/13/17 144/80         Current Outpatient Prescriptions   Medication     ketoconazole (NIZORAL) 2 % cream     omeprazole (PRILOSEC) 20 MG CR capsule     propranolol (INDERAL) 40 MG tablet     hydrochlorothiazide (MICROZIDE) 12.5 MG capsule     modafinil (PROVIGIL) 100 MG tablet     hydrocortisone (WESTCORT) 0.2 % cream     No current facility-administered medications for this visit.        Social History   Substance Use Topics     Smoking status: Current Some Day Smoker     Years: 8.00     Types: Cigars     Start date: 6/15/2008     Smokeless tobacco: Never Used     Alcohol use Yes      Comment: 3 cigars/week       Health Maintenance Due   Topic Date Due     ADVANCE DIRECTIVE PLANNING Q5 YRS  03/03/2007     PNEUMOCOCCAL (1 of 2 - PCV13) 03/03/2017     INFLUENZA VACCINE (SYSTEM ASSIGNED)  09/01/2017       No results found for: PAP      February 13, 2018 3:54 PM  "

## 2018-02-13 NOTE — MR AVS SNAPSHOT
After Visit Summary   2/13/2018    Dev Martinez    MRN: [id number]           Patient Information     Date Of Birth          1950        Visit Information        Provider Department      2/13/2018 4:00 PM Mirian Chin PA-C [facility]        Today's Diagnoses     Acute idiopathic gout involving toe of left foot    -  1    Prediabetes          Care Instructions      Gout Diet  Gout is a painful condition caused by an excess of uric acid, a waste product made by the body. Uric acid forms crystals that collect in the joints. The immune response to these crystals brings on symptoms of joint pain and swelling. This is called a gout attack. Often, medications and diet changes are combined to manage gout. Below are some guidelines for changing your diet to help you manage gout and prevent attacks. Your health care provider will help you determine the best eating plan for you.     Eating to manage gout  Weight loss for those who are overweight may help reduce gout attacks.  Eat less of these foods  Eating too many foods containing purines may raise the levels of uric acid in your body. This raises your risk for a gout attack. Try to limit these foods and drinks:    Alcohol, such as beer and red wine. You may be told to avoid alcohol completely.    Soft drinks that contain sugar or high fructose corn syrup    Certain fish, including anchovies, sardines, fish eggs, and herring    Shellfish    Certain meats, such as red meat, hot dogs, luncheon meats, and turkey    Organ meats, such as liver, kidneys, and sweetbreads    Legumes, such as dried beans and peas    Other high fat foods such as gravy, whole milk, and high fat cheeses    Vegetables such as asparagus, cauliflower, spinach, and mushrooms used to be thought to contribute to an increased risk for a gout attack, but recent studies show that high purine vegetables don't increase the risk for a gout attack.  Eat more of these foods  Other  foods may be helpful for people with gout. Add some of these foods to your diet:    Cherries contain chemicals that may lower uric acid.    Omega fatty acids. These are found in some fatty fish such as salmon, certain oils (flax, olive, or nut), and nuts themselves. Omega fatty acids may help prevent inflammation due to gout.    Dairy products that are low-fat or fat-free, such as cheese and yogurt    Complex carbohydrate foods, including whole grains, brown rice, oats, and beans    Coffee, in moderation    Water, approximately 64 ounces per day  Follow-up care  Follow up with your healthcare provider as advised.  When to seek medical advice  Call your healthcare provider right away if any of these occur:    Return of gout symptoms, usually at night:    Severe pain, swelling, and heat in a joint, especially the base of the big toe    Affected joint is hard to move    Skin of the affected joint is purple or red    Fever of 100.4 F (38 C) or higher    Pain that doesn't get better even with prescribed medicine   Date Last Reviewed: 1/12/2016 2000-2017 [employer]. [address] [place] [address]. All rights reserved. This information is not intended as a substitute for professional medical care. Always follow your healthcare professional's instructions.                Follow-ups after your visit        Your next 10 appointments already scheduled     Mar 05, 2018  4:40 PM CST   Return Nurtrition Jade with Jade Haas RD   [facility] ([facility])    [address] [address]   [phone]            Apr 09, 2018  4:20 PM CDT   Return Nurtrition Jade with Jade Haas RD   [facility] ([facility])    [address] [address]   [phone]            Apr 11, 2018  8:00 AM CDT   PHYSICAL with Behzad Pisano MD   [facility] ([facility])     "[facility]  [address]  [facility] [address]   [phone]            May 14, 2018  4:40 PM CDT   Return Donovan Hansen with Jade Haas RD   [facility] ([facility])    [facility]  [address]  [facility] [address]   [phone]              Who to contact     Please call your clinic at [phone] to:    Ask questions about your health    Make or cancel appointments    Discuss your medicines    Learn about your test results    Speak to your doctor            Additional Information About Your Visit        [employer] Information     [employer] gives you secure access to your electronic health record. If you see a primary care provider, you can also send messages to your care team and make appointments. If you have questions, please call your primary care clinic.  If you do not have a primary care provider, please call [phone] and they will assist you.      [employer] is an electronic gateway that provides easy, online access to your medical records. With [employer], you can request a clinic appointment, read your test results, renew a prescription or communicate with your care team.     To access your existing account, please contact your [facility] or call [phone] for assistance.        Care EveryWhere ID     This is your Care EveryWhere ID. This could be used by other organizations to access your [facility] medical records  [id number]        Your Vitals Were     Pulse Temperature Height Pulse Oximetry BMI (Body Mass Index)       61 98.2  F (36.8  C) (Oral) 5' 8.15\" (173.1 cm) 98% 40.27 kg/m2        Blood Pressure from Last 3 Encounters:   02/13/18 160/89   01/25/18 165/89   07/13/17 144/80    Weight from Last 3 Encounters:   02/13/18 266 lb (120.7 kg)   01/25/18 266 lb 8 oz (120.9 kg)   01/15/18 266 lb 1.3 oz (120.7 kg)              Today, you had the following     No orders found for display       "   Today's Medication Changes          These changes are accurate as of 2/13/18  4:31 PM.  If you have any questions, ask your nurse or doctor.               Start taking these medicines.        Dose/Directions    predniSONE 20 MG tablet   Commonly known as:  DELTASONE   Used for:  Acute idiopathic gout involving toe of left foot   Started by:  Mirian Chin PA-C        Dose:  40 mg   Take 2 tablets (40 mg) by mouth daily for 5 days   Quantity:  10 tablet   Refills:  0            Where to get your medicines      These medications were sent to [place] [address] [place] [facility] [address] [place] [address]  [address] [facility], [facility] [address]     Phone:  [phone]     predniSONE 20 MG tablet                Primary Care Provider Office Phone # Fax #    Behzad Pisano [phone]       [address] [place] [address]        Equal Access to Services     [facility]: Hadii yaritza mckeono Sofariba, waaxda luqadaha, qaybta kaalmada adeelieyaradha, kat cruz . So [facility] [phone].    ATENCIÓN: Si habla español, tiene a prasad disposición servicios gratuitos de asistencia lingüística. Llame al [phone].    We comply with applicable federal civil rights laws and [place] laws. We do not discriminate on the basis of race, color, national origin, age, disability, sex, sexual orientation, or gender identity.            Thank you!     Thank you for choosing [facility]  for your care. Our goal is always to provide you with excellent care. Hearing back from our patients is one way we can continue to improve our services. Please take a few minutes to complete the written survey that you may receive in the mail after your visit with us. Thank you!             Your Updated Medication List - Protect others around you: Learn how to safely use, store and throw away your medicines at www.disposemymeds.org.          This list is accurate as of 2/13/18  4:31 PM.   Always use your most recent med list.                   Brand Name Dispense Instructions for use Diagnosis    hydrochlorothiazide 12.5 MG capsule    MICROZIDE    90 capsule    Take 1 capsule (12.5 mg) by mouth daily    Essential hypertension       hydrocortisone 0.2 % cream    WESTCORT    30 g    Apply sparingly to affected area three times daily for 14 days.    Facial rash       ketoconazole 2 % cream    NIZORAL    30 g    Apply topically 2 times daily    Tinea cruris       modafinil 100 MG tablet    PROVIGIL     Take 100 mg by mouth daily        omeprazole 20 MG CR capsule    priLOSEC    90 capsule    Take 1 capsule (20 mg) by mouth daily    Gastroesophageal reflux disease without esophagitis       predniSONE 20 MG tablet    DELTASONE    10 tablet    Take 2 tablets (40 mg) by mouth daily for 5 days    Acute idiopathic gout involving toe of left foot       propranolol 40 MG tablet    INDERAL    90 tablet    Take one-half tab in the AM and one-half tab in the PM.    Essential hypertension

## 2018-02-13 NOTE — PROGRESS NOTES
SUBJECTIVE:   Dev Martinez is a 67 year old male whose PCP is Dr. Pisano.  He presents to clinic today for the following health issues:    Joint Pain with history and concern for gout attack:  Patient complains of left MTP pain redness and swelling that has been present for the past four days. He has previously been diagnosed with gout though his last attack was more than three years ago. He is not sure what may have triggered this attack.    Onset: Four days ago    Description:   Location: Left great toe  Character: Sharp, Dull ache, Burning and Fullness    Intensity: moderate    Progression of Symptoms: Patient feels symptoms may be proving somewhat today.    Accompanying Signs & Symptoms:  Other symptoms: warmth, swelling and redness    History:   Previous similar pain: YES      Precipitating factors:   Trauma or overuse: no     Alleviating factors:  Improved by: rest/inactivity    Therapies Tried and outcome: Tylenol might be helping a little bit.        Hypertension Follow-up: Blood pressures are generally well-controlled patient reports that he has not been taking his diuretic as he thought this might be making his gouty attack worse.    Outpatient blood pressures are not being checked.    Low Salt Diet: not monitoring salt  BP Readings from Last 3 Encounters:   02/13/18 145/88   01/25/18 165/89   07/13/17 144/80         Problem list and histories reviewed & adjusted, as indicated.  Additional history: as documented    Patient Active Problem List   Diagnosis     Hypertension     Obstructive sleep apnea     Prediabetes     Left bundle branch block     No past surgical history on file.    Social History   Substance Use Topics     Smoking status: Current Some Day Smoker     Years: 8.00     Types: Cigars     Start date: 6/15/2008     Smokeless tobacco: Never Used     Alcohol use Yes      Comment: 7     Family History   Problem Relation Age of Onset     CANCER Mother      DIABETES Father      HEART DISEASE  "Father          Current Outpatient Prescriptions   Medication Sig Dispense Refill     predniSONE (DELTASONE) 20 MG tablet Take 2 tablets (40 mg) by mouth daily for 5 days 10 tablet 0     ketoconazole (NIZORAL) 2 % cream Apply topically 2 times daily 30 g 1     omeprazole (PRILOSEC) 20 MG CR capsule Take 1 capsule (20 mg) by mouth daily 90 capsule 3     propranolol (INDERAL) 40 MG tablet Take one-half tab in the AM and one-half tab in the PM. 90 tablet 3     hydrochlorothiazide (MICROZIDE) 12.5 MG capsule Take 1 capsule (12.5 mg) by mouth daily 90 capsule 3     modafinil (PROVIGIL) 100 MG tablet Take 100 mg by mouth daily       hydrocortisone (WESTCORT) 0.2 % cream Apply sparingly to affected area three times daily for 14 days. 30 g 0       Reviewed and updated as needed this visit by clinical staff  Tobacco  Allergies  Meds  Problems  Med Hx  Soc Hx      Reviewed and updated as needed this visit by Provider  Tobacco  Allergies  Meds  Problems  Med Hx  Soc Hx        ROS:  Constitutional, HEENT, cardiovascular, pulmonary, gi and gu systems are negative, except as otherwise noted.    OBJECTIVE:     /88  Pulse 61  Temp 98.2  F (36.8  C) (Oral)  Ht 5' 8.15\" (173.1 cm)  Wt 266 lb (120.7 kg)  SpO2 98%  BMI 40.27 kg/m2  Body mass index is 40.27 kg/(m^2).  GENERAL: healthy, alert and no distress  NECK: no adenopathy, no asymmetry, masses, or scars and thyroid normal to palpation  RESP: lungs clear to auscultation - no rales, rhonchi or wheezes  CV: regular rate and rhythm, normal S1 S2, no S3 or S4, no murmur, click or rub, no peripheral edema and peripheral pulses strong  MS: LLE exam shows normal strength and muscle mass and Mild erythema and swelling of the forefoot concentrated at the first MTP joint. Tenderness noted with palpation and range of motion.      ASSESSMENT/PLAN:       ICD-10-CM    1. Acute idiopathic gout involving toe of left foot M10.072 predniSONE (DELTASONE) 20 MG tablet   2. " Essential hypertension I10      Etiology and treatments for gout were reviewed and discussed. Patient was given patient information on this as well as foods that may trigger a Gouty attack. We elected to treat with prednisone as the patient has a history of gastritis and does not tolerate NSAIDs well. Risks and benefits of prednisone treatment were discussed. Encourage patient to take medication always with food. May cause some sleep disturbance and mood issues especially if taken later in the day. Patient had kidney function testingWithin the past month and this was normal therefore labs were not repeated today. If episodes become more frequent we talked about checking your uric acid levels and considering a prophylactic medication with allopurinol.    Elevated blood pressure noted today. Suspect this is because patient stopped taking his diuretic a few days ago as he thought this would improve the symptoms of his gout. Encourage them to restart his medication check blood pressures at home and consider coming in for blood pressure recheck with a nurse only visit in two weeks.    Mirian Chin PA-C  [facility]

## 2018-02-13 NOTE — PATIENT INSTRUCTIONS
Gout Diet  Gout is a painful condition caused by an excess of uric acid, a waste product made by the body. Uric acid forms crystals that collect in the joints. The immune response to these crystals brings on symptoms of joint pain and swelling. This is called a gout attack. Often, medications and diet changes are combined to manage gout. Below are some guidelines for changing your diet to help you manage gout and prevent attacks. Your health care provider will help you determine the best eating plan for you.     Eating to manage gout  Weight loss for those who are overweight may help reduce gout attacks.  Eat less of these foods  Eating too many foods containing purines may raise the levels of uric acid in your body. This raises your risk for a gout attack. Try to limit these foods and drinks:    Alcohol, such as beer and red wine. You may be told to avoid alcohol completely.    Soft drinks that contain sugar or high fructose corn syrup    Certain fish, including anchovies, sardines, fish eggs, and herring    Shellfish    Certain meats, such as red meat, hot dogs, luncheon meats, and turkey    Organ meats, such as liver, kidneys, and sweetbreads    Legumes, such as dried beans and peas    Other high fat foods such as gravy, whole milk, and high fat cheeses    Vegetables such as asparagus, cauliflower, spinach, and mushrooms used to be thought to contribute to an increased risk for a gout attack, but recent studies show that high purine vegetables don't increase the risk for a gout attack.  Eat more of these foods  Other foods may be helpful for people with gout. Add some of these foods to your diet:    Cherries contain chemicals that may lower uric acid.    Omega fatty acids. These are found in some fatty fish such as salmon, certain oils (flax, olive, or nut), and nuts themselves. Omega fatty acids may help prevent inflammation due to gout.    Dairy products that are low-fat or fat-free, such as cheese and  yogurt    Complex carbohydrate foods, including whole grains, brown rice, oats, and beans    Coffee, in moderation    Water, approximately 64 ounces per day  Follow-up care  Follow up with your healthcare provider as advised.  When to seek medical advice  Call your healthcare provider right away if any of these occur:    Return of gout symptoms, usually at night:    Severe pain, swelling, and heat in a joint, especially the base of the big toe    Affected joint is hard to move    Skin of the affected joint is purple or red    Fever of 100.4 F (38 C) or higher    Pain that doesn't get better even with prescribed medicine   Date Last Reviewed: 1/12/2016 2000-2017 [employer]. [address] [place] [address], [place] [address]. All rights reserved. This information is not intended as a substitute for professional medical care. Always follow your healthcare professional's instructions.

## 2018-02-20 ENCOUNTER — OFFICE VISIT (OUTPATIENT)
Dept: FAMILY MEDICINE | Facility: CLINIC | Age: 68
End: 2018-02-20
Payer: COMMERCIAL

## 2018-02-20 VITALS
DIASTOLIC BLOOD PRESSURE: 86 MMHG | HEIGHT: 68 IN | WEIGHT: 263.75 LBS | BODY MASS INDEX: 39.97 KG/M2 | OXYGEN SATURATION: 95 % | TEMPERATURE: 99 F | SYSTOLIC BLOOD PRESSURE: 162 MMHG | HEART RATE: 61 BPM

## 2018-02-20 DIAGNOSIS — M10.072 ACUTE IDIOPATHIC GOUT OF LEFT FOOT: Primary | ICD-10-CM

## 2018-02-20 RX ORDER — PREDNISONE 20 MG/1
TABLET ORAL
Qty: 15 TABLET | Refills: 1 | Status: SHIPPED | OUTPATIENT
Start: 2018-02-20 | End: 2018-05-17

## 2018-02-20 ASSESSMENT — PAIN SCALES - GENERAL: PAINLEVEL: MILD PAIN (3)

## 2018-02-20 NOTE — PROGRESS NOTES
CHIEF COMPLAINT:  Acute gout.      HISTORY OF PRESENT ILLNESS:  Dev is a 67 year old who was here recently for the second gout flare in his life.  The first one occurred several years ago.  At that time, uric acid level was fine, and his x-ray was inconclusive, although it did show some arthritis in his first metatarsophalangeal joint.      He took prednisone 40 mg once daily for 5 days, and it really helped.  Toward the end, he was feeling so good that he drank a bit more alcohol than he might otherwise drink.  He is not sure if this was an inciting event or not, but it seemed to coincide with a flare again.  He is having quite a bit of pain in the same location on the left great toe.  The skin is warm and red.  He can walk on it.  He had a difficult night sleeping last night.  He found some old prednisone from his wife and took that, although it was at least 2 years old, and he is not sure that it helped a lot.        He has been trying to lose weight and has met with Jade Haas.  Weight is already 3 pounds less than it was just 7 days ago.  He is hoping that this will continue.      ACTIVE MEDICAL PROBLEMS:  Reviewed.      CURRENT MEDICATIONS:  Reviewed.      OBJECTIVE:  Dev is in no distress.  Blood pressure is elevated at 162/86, which is similar to what it was in January.  I did not recheck it today given that he is in pain, and it hurt to walk in the room.  Pulse is 61 and regular.  Temperature is 99.  He is 5 feet 8.2 inches and weighs 263 with a BMI of 39.93.  O2 sats are 95% on room air.  Examination of the feet reveals obvious asymmetry from the left to the right.  Left great toe is erythematous and slightly swollen at the first metatarsophalangeal joint.  The same is not true on the right side.  He took a photograph and showed me what it looked like just a couple days ago.  It was worse.  I did not order a uric acid level today.  X-ray not obtained.      ASSESSMENT/PLAN:   1.  He would like to get a  refill on the prednisone, as he felt that that worked well.  We could use something like colchicine, but he wants to hold off on that for now.  Therefore, we are going to go with a longer course.  He will take 40 mEq daily for 5 days starting this evening (he fully is aware that this might keep him up for much of the night) and then do that daily for 5 days, cutting back down to 20 mg daily for another 5 days.  Given that he will take 1 dose this late afternoon/early evening, tomorrow's dose he will take around noon, and then in 48 hours or so, he will start taking it once daily in the morning.  This should be an easy way to make that transition.   2.  I made a referral to [facility].  He will call if he can get in to have someone there get some fluid from the joint space and look into the polarized microscope to see if he has yellow (gout) crystals or blue (calcium pyrophosphate) crystals.  Once we know this, we will certainly know what we are dealing with.   3.  We can discuss allopurinol and colchicine at further visits.  He will call with any problems or questions.

## 2018-02-20 NOTE — MR AVS SNAPSHOT
After Visit Summary   2/20/2018    Dev Martinez    MRN: [id number]           Patient Information     Date Of Birth          1950        Visit Information        Provider Department      2/20/2018 4:20 PM Behzad Pisano MD [facility]        Today's Diagnoses     Acute idiopathic gout of left foot    -  1       Follow-ups after your visit        Additional Services     Rheumatolgy Referral - [facility]       Your provider has referred you to:  [facility]  [address]; [address]  [place], [place]   Phone: [phone]  Fax:      [phone]    Please be aware that coverage of these services is subject to the terms and limitations of your health insurance plan.  Call member services at your health plan with any benefit or coverage questions.      Please bring the following to your appointment:    >>   Any x-rays, CTs or MRIs which have been performed.  Contact the facility where they were done to arrange for  prior to your scheduled appointment.  Any new CT, MRI or other procedures ordered by your specialist must be performed at a [place] facility or coordinated by your clinic's referral office.    >>   List of current medications   >>   This referral request   >>   Any documents/labs given to you for this referral                    Your next 10 appointments already scheduled     Mar 05, 2018  4:40 PM CST   Return Nurtrition Jade with Jade Haas RD   [facility] ([facility])    [address] [address]   [phone]            Apr 09, 2018  4:20 PM CDT   Return Nurtrition Jade with Jade Haas RD   [facility] ([facility])    [address] [address]   [phone]            Apr 11, 2018  8:00 AM CDT   PHYSICAL with Behzad Pisano MD   [facility] ([facility]  "Clinics)    [facility]  [address]  [facility] [address]   [phone]            May 14, 2018  4:40 PM CDT   Return Donovan Hansen with Jade Haas RD   [facility] ([facility])    [facility]  [address]  [facility] [address]   [phone]              Who to contact     Please call your clinic at [phone] to:    Ask questions about your health    Make or cancel appointments    Discuss your medicines    Learn about your test results    Speak to your doctor            Additional Information About Your Visit        Blaze Information     Blaze gives you secure access to your electronic health record. If you see a primary care provider, you can also send messages to your care team and make appointments. If you have questions, please call your primary care clinic.  If you do not have a primary care provider, please call [phone] and they will assist you.      Blaze is an electronic gateway that provides easy, online access to your medical records. With Blaze, you can request a clinic appointment, read your test results, renew a prescription or communicate with your care team.     To access your existing account, please contact your [facility] or call [phone] for assistance.        Care EveryWhere ID     This is your Care EveryWhere ID. This could be used by other organizations to access your [facility] medical records  [id number]        Your Vitals Were     Pulse Temperature Height Pulse Oximetry BMI (Body Mass Index)       61 99  F (37.2  C) (Temporal) 5' 8.15\" (173.1 cm) 95% 39.93 kg/m2        Blood Pressure from Last 3 Encounters:   02/20/18 162/86   02/13/18 145/88   01/25/18 165/89    Weight from Last 3 Encounters:   02/20/18 263 lb 12 oz (119.6 kg)   02/13/18 266 lb (120.7 kg)   01/25/18 266 lb 8 oz (120.9 kg)              We Performed the Following     Rheumatolgy Referral - " [facility]          Today's Medication Changes          These changes are accurate as of 2/20/18  5:24 PM.  If you have any questions, ask your nurse or doctor.               Start taking these medicines.        Dose/Directions    predniSONE 20 MG tablet   Commonly known as:  DELTASONE   Used for:  Acute idiopathic gout of left foot   Started by:  Behzad Pisano MD        Take 2 po together once daily for 5 days, then 1 po daily for 5 more days   Quantity:  15 tablet   Refills:  1            Where to get your medicines      These medications were sent to [place] [address] [place] [facility] [address]  [address] [facility] [address] [address]     Phone:  [phone]     predniSONE 20 MG tablet                Primary Care Provider Office Phone # Fax #    Behzad Pisano [phone]       [address] [place] [address]        Equal Access to Services     [facility]: Hadii yaritza guerrier hadasho Soomaali, waaxda luqadaha, qaybta kaalmada adeegyada, kat bajwa haycole cruz . So [facility] [phone].    ATENCIÓN: Si habla español, tiene a prasad disposición servicios gratuitos de asistencia lingüística. Scotnila al [phone].    We comply with applicable federal civil rights laws and [place] laws. We do not discriminate on the basis of race, color, national origin, age, disability, sex, sexual orientation, or gender identity.            Thank you!     Thank you for choosing [facility]  for your care. Our goal is always to provide you with excellent care. Hearing back from our patients is one way we can continue to improve our services. Please take a few minutes to complete the written survey that you may receive in the mail after your visit with us. Thank you!             Your Updated Medication List - Protect others around you: Learn how to safely use, store and throw away your medicines at www.disposemymeds.org.          This list is  accurate as of 2/20/18  5:24 PM.  Always use your most recent med list.                   Brand Name Dispense Instructions for use Diagnosis    hydrochlorothiazide 12.5 MG capsule    MICROZIDE    90 capsule    Take 1 capsule (12.5 mg) by mouth daily    Essential hypertension       hydrocortisone 0.2 % cream    WESTCORT    30 g    Apply sparingly to affected area three times daily for 14 days.    Facial rash       ketoconazole 2 % cream    NIZORAL    30 g    Apply topically 2 times daily    Tinea cruris       modafinil 100 MG tablet    PROVIGIL     Take 100 mg by mouth daily        omeprazole 20 MG CR capsule    priLOSEC    90 capsule    Take 1 capsule (20 mg) by mouth daily    Gastroesophageal reflux disease without esophagitis       predniSONE 20 MG tablet    DELTASONE    15 tablet    Take 2 po together once daily for 5 days, then 1 po daily for 5 more days    Acute idiopathic gout of left foot       propranolol 40 MG tablet    INDERAL    90 tablet    Take one-half tab in the AM and one-half tab in the PM.    Essential hypertension

## 2018-02-20 NOTE — NURSING NOTE
"67 year old  Chief Complaint   Patient presents with     RECHECK     Flare up of gout in left foot. Pt reports it got better, then it got worse.       Blood pressure 162/86, pulse 61, temperature 99  F (37.2  C), temperature source Temporal, height 5' 8.15\" (173.1 cm), weight 263 lb 12 oz (119.6 kg), SpO2 95 %. Body mass index is 39.93 kg/(m^2).  Patient Active Problem List   Diagnosis     Hypertension     Obstructive sleep apnea     Prediabetes     Left bundle branch block       Wt Readings from Last 2 Encounters:   02/20/18 263 lb 12 oz (119.6 kg)   02/13/18 266 lb (120.7 kg)     BP Readings from Last 3 Encounters:   02/20/18 162/86   02/13/18 145/88   01/25/18 165/89         Current Outpatient Prescriptions   Medication     PREDNISONE PO     ketoconazole (NIZORAL) 2 % cream     omeprazole (PRILOSEC) 20 MG CR capsule     propranolol (INDERAL) 40 MG tablet     hydrochlorothiazide (MICROZIDE) 12.5 MG capsule     modafinil (PROVIGIL) 100 MG tablet     hydrocortisone (WESTCORT) 0.2 % cream     No current facility-administered medications for this visit.        Social History   Substance Use Topics     Smoking status: Current Some Day Smoker     Years: 8.00     Types: Cigars     Start date: 6/15/2008     Smokeless tobacco: Never Used     Alcohol use Yes      Comment: 7       Health Maintenance Due   Topic Date Due     ADVANCE DIRECTIVE PLANNING Q5 YRS  03/03/2007     PNEUMOCOCCAL (1 of 2 - PCV13) 03/03/2017     INFLUENZA VACCINE (SYSTEM ASSIGNED)  09/01/2017       No results found for: KRISTINE Deal MA  February 20, 2018 4:34 PM    "

## 2018-02-21 DIAGNOSIS — M10.9 ACUTE GOUTY ARTHRITIS: Primary | ICD-10-CM

## 2018-02-22 ENCOUNTER — RADIANT APPOINTMENT (OUTPATIENT)
Dept: GENERAL RADIOLOGY | Facility: CLINIC | Age: 68
End: 2018-02-22
Attending: PREVENTIVE MEDICINE
Payer: COMMERCIAL

## 2018-02-22 ENCOUNTER — OFFICE VISIT (OUTPATIENT)
Dept: ORTHOPEDICS | Facility: CLINIC | Age: 68
End: 2018-02-22
Payer: COMMERCIAL

## 2018-02-22 VITALS
BODY MASS INDEX: 39.86 KG/M2 | HEIGHT: 68 IN | WEIGHT: 263 LBS | SYSTOLIC BLOOD PRESSURE: 151 MMHG | DIASTOLIC BLOOD PRESSURE: 80 MMHG | HEART RATE: 71 BPM

## 2018-02-22 DIAGNOSIS — M79.675 GREAT TOE PAIN, LEFT: Primary | ICD-10-CM

## 2018-02-22 DIAGNOSIS — M10.9 ACUTE GOUT INVOLVING TOE OF LEFT FOOT, UNSPECIFIED CAUSE: ICD-10-CM

## 2018-02-22 DIAGNOSIS — M79.675 GREAT TOE PAIN, LEFT: ICD-10-CM

## 2018-02-22 RX ORDER — TRIAMCINOLONE ACETONIDE 40 MG/ML
40 INJECTION, SUSPENSION INTRA-ARTICULAR; INTRAMUSCULAR ONCE
Qty: 1 ML | Refills: 0 | OUTPATIENT
Start: 2018-02-22 | End: 2018-02-22

## 2018-02-22 NOTE — PROGRESS NOTES
HISTORY OF PRESENT ILLNESS  Mr. Martinez is a pleasant 67 year old year old male who presents to clinic today with left great toe pain x 1 week  Dev explains that he has had this problem a few years ago and was treated with prednisone and got better.  Was sent over by Dr Pisano his PCP for eval and possible aspiration and injection  Location: left great toe  Quality:  achy pain    Severity: 3/10 at worst    Duration: 1 week  Timing: occurs intermittently with walking  Context: occurs while exercising and lifting  Modifying factors:  resting and non-use makes it better, movement and use makes it worse  Associated signs & symptoms: some swelling  Previous similar pain: yes  Additional history: as documented    MEDICAL HISTORY  Patient Active Problem List   Diagnosis     Hypertension     Obstructive sleep apnea     Prediabetes     Left bundle branch block       Current Outpatient Prescriptions   Medication Sig Dispense Refill     predniSONE (DELTASONE) 20 MG tablet Take 2 po together once daily for 5 days, then 1 po daily for 5 more days 15 tablet 1     ketoconazole (NIZORAL) 2 % cream Apply topically 2 times daily 30 g 1     omeprazole (PRILOSEC) 20 MG CR capsule Take 1 capsule (20 mg) by mouth daily 90 capsule 3     propranolol (INDERAL) 40 MG tablet Take one-half tab in the AM and one-half tab in the PM. 90 tablet 3     hydrochlorothiazide (MICROZIDE) 12.5 MG capsule Take 1 capsule (12.5 mg) by mouth daily 90 capsule 3     modafinil (PROVIGIL) 100 MG tablet Take 100 mg by mouth daily       hydrocortisone (WESTCORT) 0.2 % cream Apply sparingly to affected area three times daily for 14 days. 30 g 0       No Known Allergies    Family History   Problem Relation Age of Onset     CANCER Mother      DIABETES Father      HEART DISEASE Father        Additional medical/Social/Surgical histories reviewed in [employer] and updated as appropriate.     REVIEW OF SYSTEMS (2/22/2018)  10 point ROS of systems including Constitutional,  "Eyes, Respiratory, Cardiovascular, Gastroenterology, Genitourinary, Integumentary, Musculoskeletal, Psychiatric were all negative except for pertinent positives noted in my HPI.     PHYSICAL EXAM  Vitals:    02/22/18 1506   BP: 151/80   Pulse: 71   Weight: 119.3 kg (263 lb)   Height: 1.727 m (5' 8\")     Vital Signs: /80  Pulse 71  Ht 1.727 m (5' 8\")  Wt 119.3 kg (263 lb)  BMI 39.99 kg/m2 Patient declined being weighed. Body mass index is 39.99 kg/(m^2).    General  - normal appearance, in no obvious distress  CV  - normal pulses at posterior tib and dorsalis pedis  Pulm  - normal respiratory pattern, non-labored  Musculoskeletal - left foot  - stance: normal gait with slight limp, normal stance without excessive pronation, normal heel inversion with standing heel raise, no obvious leg length discrepancy, abnormal heel and toe walk-favoring left foot  - inspection: no swelling or effusion,  normal bone and joint alignment, no obvious deformity  - palpation: no bony or soft tissue tenderness, except at left great toe MTP joint  - ROM: normal active and passive ROM of great and lesser toes except left great toe pain with flexion and extension at MTP joint, no pain with MT translation  - strength: 5/5 in all planes  Neuro  - no sensory or motor deficit, grossly normal coordination, normal muscle tone  Skin  - no ecchymosis, erythema, warmth, or induration, no obvious rash, except some erythema and swelling overlying the left great toe MTP joint  Psych  - interactive, appropriate, normal mood and affect    ASSESSMENT & PLAN  66 yo male with right great toe pain due to possible gouty flare, has 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   The patient s  Great toe was sterilely prepped with chloraprep.   40 mg of triamcinolone suspension was drawn up into a 5 mL syringe with 1 mL of 1% lidocaine was drawn up  The patient was attempted to be aspirated which did not yield any fluid, and then was injected with a 20guage needle at the_superiorlateral aspect of their toe joint  There were no complications. The patient tolerated the procedure well. There was minimal bleeding.   The patient was instructed to ice the toe upon leaving clinic and refrain from overuse over the next 3 days.   The patient was instructed to go to the emergency room with any usual pain, swelling, or redness occurred in the injected area.   The patient was given a followup appointment to evaluate response to the injection to their increased range of motion and reduction of pain.  Follow up PRN  Dr Norm Cornejo

## 2018-02-22 NOTE — NURSING NOTE
[address] [address]  Dept: [phone]  ______________________________________________________________________________    Patient: Dev Martinez   : 1950   MRN: [id number]   2018    INVASIVE PROCEDURE SAFETY CHECKLIST    Date: 2017   Procedure:Left Big toe aspiration and kenalog injection  Patient Name: Dev Martinez  MRN: [id number]  YOB: 1950    Action: Complete sections as appropriate. Any discrepancy results in a HARD COPY until resolved.     PRE PROCEDURE:  Patient ID verified with 2 identifiers (name and  or MRN): Yes  Procedure and site verified with patient/designee (when able): Yes  Accurate consent documentation in medical record: Yes  H&P (or appropriate assessment) documented in medical record: Yes  H&P must be up to 20 days prior to procedure and updates within 24 hours of procedure as applicable: NA  Relevant diagnostic and radiology test results appropriately labeled and displayed as applicable: Yes  Procedure site(s) marked with provider initials: NA    TIMEOUT:  Time-Out performed immediately prior to starting procedure, including verbal and active participation of all team members addressing the following:Yes  * Correct patient identify  * Confirmed that the correct side and site are marked  * An accurate procedure consent form  * Agreement on the procedure to be done  * Correct patient position  * Relevant images and results are properly labeled and appropriately displayed  * The need to administer antibiotics or fluids for irrigation purposes during the procedure as applicable   * Safety precautions based on patient history or medication use    DURING PROCEDURE: Verification of correct person, site, and procedures any time the responsibility for care of the patient is transferred to another member of the care team.     The following medication was given:     MEDICATION:  Lidocaine without  epinephrine  ROUTE: intra articular  SITE: left big toe  DOSE: 2 cc  LOT #: [id number]  : [employer]  EXPIRATION DATE: 1DEC2019  NDC#: 1806-3747-23   Was there drug waste? Yes  Amount of drug waste (mL): 18.  Reason for waste:  Single use vial    MEDICATION:  Kenalog 40 mg  ROUTE: intra articular  SITE: left big toe  DOSE: 1 cc  LOT #: [id number]  : [employer]  EXPIRATION DATE: MAY2019  NDC#: 4476-6072-15   Was there drug waste? No    Annel Burris, ATC  February 22, 2018

## 2018-02-22 NOTE — PROGRESS NOTES
SPORTS & ORTHOPEDIC WALK-IN VISIT 2/22/2018    Primary Care Physician: Dr. Pisano    Pt states he'd finished a prednisone prescription last Saturday. Started on Tuesday 2/13. Got better after the prednisone and then started feeling the pain again on Monday night. Saw Dr. Pisano on Tuesday, restarted prednisone.     Reason for visit:     What part of your body is injured / painful?  left foot    What caused the injury /pain? Unsure    How long ago did your injury occur or pain begin? several days ago    What are your most bothersome symptoms? Pain and Swelling    How would you characterize your symptom?  aching and sharp    What makes your symptoms better? Other: prednisone    What makes your symptoms worse? Other: unsure, possibly perlaey    Have you been previously seen for this problem? Yes, Dr. Pisano    Medical History:    Any recent changes to your medical history? No    Any new medication prescribed since last visit? No    Have you had surgery on this body part before? No    Social History:    Occupation: IT for [employer]    Handedness: Right    Exercise: None, walking everyday 15 minutes on break    Review of Systems:    Do you have fever, chills, weight loss? No    Do you have any vision problems? No    Do you have any chest pain or edema? No    Do you have any shortness of breath or wheezing?  Yes, going up stairs, has been getting better    Do you have stomach problems? No, history of gastritis    Do you have any numbness or focal weakness? No    Do you have diabetes? No    Do you have problems with bleeding or clotting? No    Do you have an rashes or other skin lesions? No       Agree with above HPI per ATC  Dr Cornejo

## 2018-02-22 NOTE — MR AVS SNAPSHOT
After Visit Summary   2/22/2018    Dev Martinez    MRN: [id number]           Patient Information     Date Of Birth          1950        Visit Information        Provider Department      2/22/2018 3:10 PM Norm Cornejo MD [facility]        Today's Diagnoses     Great toe pain, left    -  1    Acute gout involving toe of left foot, unspecified cause           Follow-ups after your visit        Your next 10 appointments already scheduled     Mar 05, 2018  4:40 PM CST   Return Nurtrition Jade with Jade Haas RD   [facility] ([facility])    [place] [address] [address]  [facility] [address]   [phone]            Apr 09, 2018  4:20 PM CDT   Return Nurtrition Jade with Jade Haas RD   [facility] ([facility])    [place] [address] [address]  [facility] [address]   [phone]            Apr 11, 2018  8:00 AM CDT   PHYSICAL with Behzad Pisano MD   [facility] ([facility])    [place] [address] [address]  [facility] [address]   [phone]            May 14, 2018  4:40 PM CDT   Return Nurtrition Jade with Jade Haas RD   [facility] ([facility])    [place] [address] [address]  [facility] [address]   [phone]              Future tests that were ordered for you today     Open Future Orders        Priority Expected Expires Ordered    Uric acid Routine  2/21/2019 2/21/2018            Who to contact     Please call your clinic at [phone] to:    Ask questions about your health    Make or cancel appointments    Discuss your medicines    Learn about your test results    Speak to your doctor            Additional Information About Your Visit        MyChart Information     "[employer]hart gives you secure access to your electronic health record. If you see a primary care provider, you  "can also send messages to your care team and make appointments. If you have questions, please call your primary care clinic.  If you do not have a primary care provider, please call [phone] and they will assist you.      [employer] is an electronic gateway that provides easy, online access to your medical records. With [employer], you can request a clinic appointment, read your test results, renew a prescription or communicate with your care team.     To access your existing account, please contact your [facility] or call [phone] for assistance.        Care EveryWhere ID     This is your Care EveryWhere ID. This could be used by other organizations to access your [facility] medical records  [id number]        Your Vitals Were     Pulse Height BMI (Body Mass Index)             71 1.727 m (5' 8\") 39.99 kg/m2          Blood Pressure from Last 3 Encounters:   02/22/18 151/80   02/20/18 162/86   02/13/18 145/88    Weight from Last 3 Encounters:   02/22/18 119.3 kg (263 lb)   02/20/18 119.6 kg (263 lb 12 oz)   02/13/18 120.7 kg (266 lb)              We Performed the Following     Small Joint/Bursa injection and/or drainage (Finger) [20600]          Today's Medication Changes          These changes are accurate as of 2/22/18  4:13 PM.  If you have any questions, ask your nurse or doctor.               Start taking these medicines.        Dose/Directions    triamcinolone acetonide 40 MG/ML injection   Commonly known as:  KENALOG   Used for:  Great toe pain, left, Acute gout involving toe of left foot, unspecified cause   Started by:  Norm Cornejo MD        Dose:  40 mg   1 mL (40 mg) by INTRA-ARTICULAR route once for 1 dose   Quantity:  1 mL   Refills:  0            Where to get your medicines      Some of these will need a paper prescription and others can be bought over the counter.  Ask your nurse if you have questions.     You don't need a prescription for these medications    "  triamcinolone acetonide 40 MG/ML injection                Primary Care Provider Office Phone # Fax #    Behzad Pisano [phone]       [address] [place] [address]        Equal Access to Services     TULIO LU : Hadii yaritza guerrier mikeo Soomaali, waaxda luqadaha, qaybta kaalmada adeyves, kat arangosania solomonelie allred nancy olsen. So [facility] [phone].    ATENCIÓN: Si habla español, tiene a prasad disposición servicios gratuitos de asistencia lingüística. Llame al [phone].    We comply with applicable federal civil rights laws and [place] laws. We do not discriminate on the basis of race, color, national origin, age, disability, sex, sexual orientation, or gender identity.            Thank you!     Thank you for choosing [facility] CLIN[facility]  for your care. Our goal is always to provide you with excellent care. Hearing back from our patients is one way we can continue to improve our services. Please take a few minutes to complete the written survey that you may receive in the mail after your visit with us. Thank you!             Your Updated Medication List - Protect others around you: Learn how to safely use, store and throw away your medicines at www.disposemymeds.org.          This list is accurate as of 2/22/18  4:13 PM.  Always use your most recent med list.                   Brand Name Dispense Instructions for use Diagnosis    hydrochlorothiazide 12.5 MG capsule    MICROZIDE    90 capsule    Take 1 capsule (12.5 mg) by mouth daily    Essential hypertension       hydrocortisone 0.2 % cream    WESTCORT    30 g    Apply sparingly to affected area three times daily for 14 days.    Facial rash       ketoconazole 2 % cream    NIZORAL    30 g    Apply topically 2 times daily    Tinea cruris       modafinil 100 MG tablet    PROVIGIL     Take 100 mg by mouth daily        omeprazole 20 MG CR capsule    priLOSEC    90 capsule    Take 1 capsule (20 mg) by  mouth daily    Gastroesophageal reflux disease without esophagitis       predniSONE 20 MG tablet    DELTASONE    15 tablet    Take 2 po together once daily for 5 days, then 1 po daily for 5 more days    Acute idiopathic gout of left foot       propranolol 40 MG tablet    INDERAL    90 tablet    Take one-half tab in the AM and one-half tab in the PM.    Essential hypertension       triamcinolone acetonide 40 MG/ML injection    KENALOG    1 mL    1 mL (40 mg) by INTRA-ARTICULAR route once for 1 dose    Great toe pain, left, Acute gout involving toe of left foot, unspecified cause

## 2018-02-22 NOTE — LETTER
2/22/2018       RE: Dev Martinez  [address]  [facility] [address]     Dear Colleague,    Thank you for referring your patient, Dev Martinez, to the [facility] CLINIC at [facility]. Please see a copy of my visit note below.          SPORTS & ORTHOPEDIC WALK-IN VISIT 2/22/2018    Primary Care Physician: Dr. Pisano    Pt states he'd finished a prednisone prescription last Saturday. Started on Tuesday 2/13. Got better after the prednisone and then started feeling the pain again on Monday night. Saw Dr. Pisano on Tuesday, restarted prednisone.     Reason for visit:     What part of your body is injured / painful?  left foot    What caused the injury /pain? Unsure    How long ago did your injury occur or pain begin? several days ago    What are your most bothersome symptoms? Pain and Swelling    How would you characterize your symptom?  aching and sharp    What makes your symptoms better? Other: prednisone    What makes your symptoms worse? Other: unsure, possibly perlaey    Have you been previously seen for this problem? Yes, Dr. Pisano    Medical History:    Any recent changes to your medical history? No    Any new medication prescribed since last visit? No    Have you had surgery on this body part before? No    Social History:    Occupation: IT for [employer]    Handedness: Right    Exercise: None, walking everyday 15 minutes on break    Review of Systems:    Do you have fever, chills, weight loss? No    Do you have any vision problems? No    Do you have any chest pain or edema? No    Do you have any shortness of breath or wheezing?  Yes, going up stairs, has been getting better    Do you have stomach problems? No, history of gastritis    Do you have any numbness or focal weakness? No    Do you have diabetes? No    Do you have problems with bleeding or clotting? No    Do you have an rashes or other skin lesions? No       Agree with above  HPI per ATC  Dr Cornejo    HISTORY OF PRESENT ILLNESS  Mr. Martinez is a pleasant 67 year old year old male who presents to clinic today with left great toe pain x 1 week  Dev explains that he has had this problem a few years ago and was treated with prednisone and got better.  Was sent over by Dr Pisano his PCP for eval and possible aspiration and injection  Location: left great toe  Quality:  achy pain    Severity: 3/10 at worst    Duration: 1 week  Timing: occurs intermittently with walking  Context: occurs while exercising and lifting  Modifying factors:  resting and non-use makes it better, movement and use makes it worse  Associated signs & symptoms: some swelling  Previous similar pain: yes  Additional history: as documented    MEDICAL HISTORY  Patient Active Problem List   Diagnosis     Hypertension     Obstructive sleep apnea     Prediabetes     Left bundle branch block       Current Outpatient Prescriptions   Medication Sig Dispense Refill     predniSONE (DELTASONE) 20 MG tablet Take 2 po together once daily for 5 days, then 1 po daily for 5 more days 15 tablet 1     ketoconazole (NIZORAL) 2 % cream Apply topically 2 times daily 30 g 1     omeprazole (PRILOSEC) 20 MG CR capsule Take 1 capsule (20 mg) by mouth daily 90 capsule 3     propranolol (INDERAL) 40 MG tablet Take one-half tab in the AM and one-half tab in the PM. 90 tablet 3     hydrochlorothiazide (MICROZIDE) 12.5 MG capsule Take 1 capsule (12.5 mg) by mouth daily 90 capsule 3     modafinil (PROVIGIL) 100 MG tablet Take 100 mg by mouth daily       hydrocortisone (WESTCORT) 0.2 % cream Apply sparingly to affected area three times daily for 14 days. 30 g 0       No Known Allergies    Family History   Problem Relation Age of Onset     CANCER Mother      DIABETES Father      HEART DISEASE Father        Additional medical/Social/Surgical histories reviewed in [facility] and updated as appropriate.     REVIEW OF SYSTEMS (2/22/2018)  10 point ROS of systems  "including Constitutional, Eyes, Respiratory, Cardiovascular, Gastroenterology, Genitourinary, Integumentary, Musculoskeletal, Psychiatric were all negative except for pertinent positives noted in my HPI.     PHYSICAL EXAM  Vitals:    02/22/18 1506   BP: 151/80   Pulse: 71   Weight: 119.3 kg (263 lb)   Height: 1.727 m (5' 8\")     Vital Signs: /80  Pulse 71  Ht 1.727 m (5' 8\")  Wt 119.3 kg (263 lb)  BMI 39.99 kg/m2 Patient declined being weighed. Body mass index is 39.99 kg/(m^2).    General  - normal appearance, in no obvious distress  CV  - normal pulses at posterior tib and dorsalis pedis  Pulm  - normal respiratory pattern, non-labored  Musculoskeletal - left foot  - stance: normal gait with slight limp, normal stance without excessive pronation, normal heel inversion with standing heel raise, no obvious leg length discrepancy, abnormal heel and toe walk-favoring left foot  - inspection: no swelling or effusion,  normal bone and joint alignment, no obvious deformity  - palpation: no bony or soft tissue tenderness, except at left great toe MTP joint  - ROM: normal active and passive ROM of great and lesser toes except left great toe pain with flexion and extension at MTP joint, no pain with MT translation  - strength: 5/5 in all planes  Neuro  - no sensory or motor deficit, grossly normal coordination, normal muscle tone  Skin  - no ecchymosis, erythema, warmth, or induration, no obvious rash, except some erythema and swelling overlying the left great toe MTP joint  Psych  - interactive, appropriate, normal mood and affect    ASSESSMENT & PLAN  68 yo male with right great toe pain due to possible gouty flare, 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   The patient s  Great toe was sterilely prepped with chloraprep.   40 mg of triamcinolone suspension was drawn up into a 5 mL syringe with 1 mL of 1% lidocaine was drawn up  The patient was attempted to be aspirated which did not yield any fluid, and then was injected with a 20guage needle at the_superiorlateral aspect of their toe joint  There were no complications. The patient tolerated the procedure well. There was minimal bleeding.   The patient was instructed to ice the toe upon leaving clinic and refrain from overuse over the next 3 days.   The patient was instructed to go to the emergency room with any usual pain, swelling, or redness occurred in the injected area.   The patient was given a followup appointment to evaluate response to the injection to their increased range of motion and reduction of pain.  Follow up PRN      Again, thank you for allowing me to participate in the care of your patient.      Sincerely,    Norm Cornejo MD

## 2018-03-02 DIAGNOSIS — I10 ESSENTIAL HYPERTENSION: ICD-10-CM

## 2018-03-02 RX ORDER — HYDROCHLOROTHIAZIDE 12.5 MG/1
12.5 CAPSULE ORAL DAILY
Qty: 90 CAPSULE | Refills: 3 | Status: SHIPPED | OUTPATIENT
Start: 2018-03-02 | End: 2018-04-11 | Stop reason: DRUGHIGH

## 2018-03-02 NOTE — TELEPHONE ENCOUNTER
Last office visit: 02/20/2018, no future appointment    Prescription approved per G Refill Protocol.

## 2018-03-05 ENCOUNTER — OFFICE VISIT (OUTPATIENT)
Dept: FAMILY MEDICINE | Facility: CLINIC | Age: 68
End: 2018-03-05
Payer: COMMERCIAL

## 2018-03-05 VITALS — HEIGHT: 68 IN | BODY MASS INDEX: 39.4 KG/M2 | WEIGHT: 260 LBS

## 2018-03-05 DIAGNOSIS — Z71.3 DIETARY COUNSELING: Primary | ICD-10-CM

## 2018-03-05 DIAGNOSIS — E66.9 OBESITY, UNSPECIFIED OBESITY SEVERITY, UNSPECIFIED OBESITY TYPE: ICD-10-CM

## 2018-03-05 NOTE — MR AVS SNAPSHOT
After Visit Summary   3/5/2018    Dev Martinez    MRN: [id number]           Patient Information     Date Of Birth          1950        Visit Information        Provider Department      3/5/2018 4:40 PM Jade Haas RD [facility]        Today's Diagnoses     Dietary counseling    -  1    Obesity, unspecified obesity severity, unspecified obesity type           Follow-ups after your visit        Your next 10 appointments already scheduled     Apr 09, 2018  4:20 PM CDT   Return Nurtrition Jade with Jade Haas RD   [facility] ([facility])    [facility]  [address]  [facility] [address]   [phone]            Apr 11, 2018  8:00 AM CDT   PHYSICAL with Behzad Pisano MD   [facility] ([facility])    [place] [address] [address]  [facility] [address]   [phone]            May 14, 2018  4:40 PM CDT   Return Nurtrition Jade with Jade Haas RD   [facility] ([facility])    [place] [address] [address]  [facility] [id number] [phone]              Who to contact     Please call your clinic at [phone] to:    Ask questions about your health    Make or cancel appointments    Discuss your medicines    Learn about your test results    Speak to your doctor            Additional Information About Your Visit        MyChart Information     [employer] gives you secure access to your electronic health record. If you see a primary care provider, you can also send messages to your care team and make appointments. If you have questions, please call your primary care clinic.  If you do not have a primary care provider, please call [phone] and they will assist you.      [employer] is an electronic gateway that provides easy, online access to your medical records. With [employer], you can request a clinic appointment, read your test results, renew a prescription or  "communicate with your care team.     To access your existing account, please contact your [facility] or call [phone] for assistance.        Care EveryWhere ID     This is your Care EveryWhere ID. This could be used by other organizations to access your [facility] medical records  [id number]        Your Vitals Were     Height BMI (Body Mass Index)                1.727 m (5' 7.99\") 39.54 kg/m2           Blood Pressure from Last 3 Encounters:   02/22/18 151/80   02/20/18 162/86   02/13/18 145/88    Weight from Last 3 Encounters:   03/05/18 117.9 kg (260 lb)   02/22/18 119.3 kg (263 lb)   02/20/18 119.6 kg (263 lb 12 oz)              We Performed the Following     MNT INDIVIDUAL F/U REASSESS, EA 15 MIN        Primary Care Provider Office Phone # Fax #    Behzad Pisano [phone]        [address] [place] [address]        Equal Access to Services     TULIO LU : Hadii aad ku hadasho Soomaali, waaxda luqadaha, qaybta kaalmada adeegyada, waxay idiin haycole cruz . So [facility] [phone].    ATENCIÓN: Si habla español, tiene a prasad disposición servicios gratuitos de asistencia lingüística. Llame al [phone].    We comply with applicable federal civil rights laws and [place] laws. We do not discriminate on the basis of race, color, national origin, age, disability, sex, sexual orientation, or gender identity.            Thank you!     Thank you for choosing [facility]  for your care. Our goal is always to provide you with excellent care. Hearing back from our patients is one way we can continue to improve our services. Please take a few minutes to complete the written survey that you may receive in the mail after your visit with us. Thank you!             Your Updated Medication List - Protect others around you: Learn how to safely use, store and throw away your medicines at www.disposemymeds.org.          This list is accurate " as of 3/5/18  5:13 PM.  Always use your most recent med list.                   Brand Name Dispense Instructions for use Diagnosis    hydrochlorothiazide 12.5 MG capsule    MICROZIDE    90 capsule    Take 1 capsule (12.5 mg) by mouth daily    Essential hypertension       hydrocortisone 0.2 % cream    WESTCORT    30 g    Apply sparingly to affected area three times daily for 14 days.    Facial rash       ketoconazole 2 % cream    NIZORAL    30 g    Apply topically 2 times daily    Tinea cruris       modafinil 100 MG tablet    PROVIGIL     Take 100 mg by mouth daily        omeprazole 20 MG CR capsule    priLOSEC    90 capsule    Take 1 capsule (20 mg) by mouth daily    Gastroesophageal reflux disease without esophagitis       predniSONE 20 MG tablet    DELTASONE    15 tablet    Take 2 po together once daily for 5 days, then 1 po daily for 5 more days    Acute idiopathic gout of left foot       propranolol 40 MG tablet    INDERAL    90 tablet    Take one-half tab in the AM and one-half tab in the PM.    Essential hypertension

## 2018-03-05 NOTE — PROGRESS NOTES
"Referring provider: Self    Dev Martinez  is a 67 year old male presents today for follow-up nutrition consultation. Suffered from gout recently, took prednisone to treat it. Reports eating less, while walking less from the gout pain (in toe), but weight loss continues. Doing great with portions.     Vitals:  Ht 1.727 m (5' 7.99\")  Wt 117.9 kg (260 lb)  BMI 39.54 kg/m2   Wt Readings from Last 4 Encounters:   03/05/18 117.9 kg (260 lb)   02/22/18 119.3 kg (263 lb)   02/20/18 119.6 kg (263 lb 12 oz)   02/13/18 120.7 kg (266 lb)       Nutrition History  Patient is on a regular  diet at home.  No recall done at this visit. In general, portions are smaller. Trying to eat less bread and carbohydrates. Consuming soups for dinners often.     Liquid:  750 ml water  2 cups tea  Some whiskey  Takes metamucil 2x/week  ETOH (1 drink = 12 oz beer, 5 oz wine, 1.5 oz liquor): wine 5-6 times per week      Physical Activity  Walking less due to gout pain.   Plans to start swimming.       Time with Patient:  30 Minutes    Nutrition  DX:.   1. Dietary counseling    2. Obesity, unspecified obesity severity, unspecified obesity type        Nutrition Goals:   1: Try and get to the pool once a week. (1/2 mile swim)  2: Maintain current eating patterns.     Long term goals:  1:  Initial weight loss goal to lose 5-10% weight via lifestyle changes.     Jade Haas, MS, RD, CSSD, LD  [facility]       "

## 2018-04-02 DIAGNOSIS — I10 ESSENTIAL HYPERTENSION: ICD-10-CM

## 2018-04-02 RX ORDER — PROPRANOLOL HYDROCHLORIDE 40 MG/1
TABLET ORAL
Qty: 90 TABLET | Refills: 0 | Status: SHIPPED | OUTPATIENT
Start: 2018-04-02 | End: 2018-07-02

## 2018-04-02 NOTE — TELEPHONE ENCOUNTER
Medication is being filled for 1 time refill only due to:  Patient needs to be seen because needs BP rechecked,  greater than 140/90.     Has an appt for 4/11/18 with Lesli Pisano RN  April 2, 2018 10:54 AM

## 2018-04-09 ENCOUNTER — OFFICE VISIT (OUTPATIENT)
Dept: FAMILY MEDICINE | Facility: CLINIC | Age: 68
End: 2018-04-09
Payer: COMMERCIAL

## 2018-04-09 DIAGNOSIS — E66.9 OBESITY, UNSPECIFIED OBESITY SEVERITY, UNSPECIFIED OBESITY TYPE: ICD-10-CM

## 2018-04-09 DIAGNOSIS — Z71.3 DIETARY COUNSELING: Primary | ICD-10-CM

## 2018-04-09 NOTE — MR AVS SNAPSHOT
After Visit Summary   4/9/2018    Dev Martinez    MRN: [id number]           Patient Information     Date Of Birth          1950        Visit Information        Provider Department      4/9/2018 4:20 PM Jade Haas RD [facility]        Today's Diagnoses     Dietary counseling    -  1    Obesity, unspecified obesity severity, unspecified obesity type           Follow-ups after your visit        Your next 10 appointments already scheduled     Apr 10, 2018  1:40 PM CDT   Return Visit with Mirian Chin PA-C   [facility] ([facility])    [address], [facility]  [facility] [address]   [phone]            Apr 11, 2018  8:00 AM CDT   PHYSICAL with Behzad Pisano MD   [facility] ([facility])    [address]  [facility] [address] [phone]              Who to contact     Please call your clinic at [phone] to:    Ask questions about your health    Make or cancel appointments    Discuss your medicines    Learn about your test results    Speak to your doctor            Additional Information About Your Visit        [employer] Information     [employer] gives you secure access to your electronic health record. If you see a primary care provider, you can also send messages to your care team and make appointments. If you have questions, please call your primary care clinic.  If you do not have a primary care provider, please call [phone] and they will assist you.      [employer] is an electronic gateway that provides easy, online access to your medical records. With [employer], you can request a clinic appointment, read your test results, renew a prescription or communicate with your care team.     To access your existing account, please contact your [facility] or call [phone] for assistance.        Care EveryWhere ID     This is your Care  EveryWhere ID. This could be used by other organizations to access your [facility] medical records  [id number]         Blood Pressure from Last 3 Encounters:   02/22/18 151/80   02/20/18 162/86   02/13/18 145/88    Weight from Last 3 Encounters:   03/05/18 260 lb (117.9 kg)   02/22/18 263 lb (119.3 kg)   02/20/18 263 lb 12 oz (119.6 kg)              We Performed the Following     MNT INDIVIDUAL F/U REASSESS, EA 15 MIN        Primary Care Provider Office Phone # Fax #    Behzad Pisano [phone]       [address] [place] [address]        Equal Access to Services     TULIO LU : Hadii yaritza mckeono Sofariba, waaxda luqadaha, qaybta kaalmada adeegyada, kat cruz . So [facility] [phone].    ATENCIÓN: Si habla español, tiene a prasad disposición servicios gratuitos de asistencia lingüística. [facility] [phone].    We comply with applicable federal civil rights laws and [place] laws. We do not discriminate on the basis of race, color, national origin, age, disability, sex, sexual orientation, or gender identity.            Thank you!     Thank you for choosing [facility]  for your care. Our goal is always to provide you with excellent care. Hearing back from our patients is one way we can continue to improve our services. Please take a few minutes to complete the written survey that you may receive in the mail after your visit with us. Thank you!             Your Updated Medication List - Protect others around you: Learn how to safely use, store and throw away your medicines at www.disposemymeds.org.          This list is accurate as of 4/9/18 11:59 PM.  Always use your most recent med list.                   Brand Name Dispense Instructions for use Diagnosis    hydrochlorothiazide 12.5 MG capsule    MICROZIDE    90 capsule    Take 1 capsule (12.5 mg) by mouth daily    Essential hypertension       hydrocortisone 0.2 % cream    WESTCORT    30 g     Apply sparingly to affected area three times daily for 14 days.    Facial rash       ketoconazole 2 % cream    NIZORAL    30 g    Apply topically 2 times daily    Tinea cruris       modafinil 100 MG tablet    PROVIGIL     Take 100 mg by mouth daily        omeprazole 20 MG CR capsule    priLOSEC    90 capsule    Take 1 capsule (20 mg) by mouth daily    Gastroesophageal reflux disease without esophagitis       predniSONE 20 MG tablet    DELTASONE    15 tablet    Take 2 po together once daily for 5 days, then 1 po daily for 5 more days    Acute idiopathic gout of left foot       propranolol 40 MG tablet    INDERAL    90 tablet    Take one-half tab in the AM and one-half tab in the PM.  Needs BP recheck for further refills    Essential hypertension

## 2018-04-09 NOTE — PROGRESS NOTES
Referring provider: Saul Méndez LISA Martinez  is a 67 year old male presents today for follow-up nutrition consultation. Suffered from gout recently, took prednisone to treat it. Reports eating less, while walking less from the gout pain (in toe), but weight loss continues. Doing great with portions.     Vitals:  There were no vitals taken for this visit.   Wt Readings from Last 4 Encounters:   03/05/18 260 lb (117.9 kg)   02/22/18 263 lb (119.3 kg)   02/20/18 263 lb 12 oz (119.6 kg)   02/13/18 266 lb (120.7 kg)       Nutrition History  Patient is on a regular  diet at home.  No recall done at this visit. In general, portions are smaller. Trying to eat less bread and carbohydrates. Consuming soups for dinners often.     Liquid:  1500 ml water  2 c tea  2 c of fluids with metamucil     Total fluids:  2500 ml  Some whiskey  Takes metamucil 2x/week  ETOH (1 drink = 12 oz beer, 5 oz wine, 1.5 oz liquor): 1 glass of wine a day.     Physical Activity  Walking less due to gout pain.   Plans to start swimming.       Time with Patient:  30 Minutes    Nutrition  DX:.   1. Dietary counseling    2. Obesity, unspecified obesity severity, unspecified obesity type        Nutrition Goals:   1: Continue working with lifestyle management program  2:  Increase fluid intake to 100oz per day      Long term goals:  1:  Initial weight loss goal to lose 5-10% weight via lifestyle changes.     Jade Haas, MS, RD, CSSD, LD  [facility]

## 2018-04-10 ENCOUNTER — OFFICE VISIT (OUTPATIENT)
Dept: FAMILY MEDICINE | Facility: CLINIC | Age: 68
End: 2018-04-10
Payer: COMMERCIAL

## 2018-04-10 VITALS
HEART RATE: 62 BPM | BODY MASS INDEX: 40.47 KG/M2 | HEIGHT: 68 IN | SYSTOLIC BLOOD PRESSURE: 148 MMHG | WEIGHT: 267 LBS | OXYGEN SATURATION: 95 % | TEMPERATURE: 98.2 F | DIASTOLIC BLOOD PRESSURE: 82 MMHG

## 2018-04-10 DIAGNOSIS — I10 ESSENTIAL HYPERTENSION: ICD-10-CM

## 2018-04-10 DIAGNOSIS — J20.9 ACUTE BRONCHITIS WITH SYMPTOMS > 10 DAYS: Primary | ICD-10-CM

## 2018-04-10 DIAGNOSIS — R21 FACIAL RASH: ICD-10-CM

## 2018-04-10 RX ORDER — HYDROCORTISONE VALERATE CREAM 2 MG/G
CREAM TOPICAL
Qty: 30 G | Refills: 1 | Status: SHIPPED | OUTPATIENT
Start: 2018-04-10 | End: 2021-03-11

## 2018-04-10 RX ORDER — AZITHROMYCIN 250 MG/1
TABLET, FILM COATED ORAL
Qty: 6 TABLET | Refills: 0 | Status: SHIPPED | OUTPATIENT
Start: 2018-04-10 | End: 2018-08-03

## 2018-04-10 RX ORDER — CODEINE PHOSPHATE AND GUAIFENESIN 10; 100 MG/5ML; MG/5ML
2 SOLUTION ORAL EVERY 4 HOURS PRN
Qty: 180 ML | Refills: 0 | Status: SHIPPED | OUTPATIENT
Start: 2018-04-10 | End: 2019-04-30

## 2018-04-10 NOTE — PATIENT INSTRUCTIONS
You have been prescribed an antibiotic to treat a bacterial infection. Watch for signs of allergic reaction including swelling around the mouth or eyes and the development of a rash.  If you develop any of these symptoms, stop your medication, take a benadryl (25-50 mg) and give our office a call. Consider probiotic therapy to decrease the possibility of diarrhea associated with antibiotic use.    Use cough medication with codeine at night before bed. This is a narcotic cough syrup and it should not be taken with alcohol and you should not drive while taking. During the day you take Mucinex DM.  Drink lots of fluids.    Avoid decongestants    Your blood pressure was elevated today.  Avoiud decongestants and check BP at home over the next couple weeks.    Please let me know if you have any questions.  Thank you for allowing me to participate in your care.  It was my pleasure meeting you.  Lindsay Chin PA-C

## 2018-04-10 NOTE — PROGRESS NOTES
SUBJECTIVE:   Dev Martinez is a 68 year old male who presents to clinic today for the following health issues:    Acute Illness   Acute illness concerns: Started with cough cold and sore throat 2 weeks ago.  The cough has persisted and she is coughing to the point of getting dizzy.  Productive in the mornings gets dry and tight during the day  Onset: 2 weeks    Fever: no    Chills/Sweats: no    Headache (location?): no    Sinus Pressure:no    Conjunctivitis:  no    Ear Pain: no    Rhinorrhea: YES    Congestion: no    Sore Throat: no     Cough: YES-productive of yellow sputum.  Chest congestion noted in the morning.    Wheeze: no    Decreased Appetite: no    Nausea: no    Vomiting: no    Diarrhea:  no    Dysuria/Freq.: no    Fatigue/Achiness: YES    Sick/Strep Exposure: YES- workmates     Therapies Tried and outcome: Cold medicine, robitussin.    Problem list and histories reviewed & adjusted, as indicated.  Additional history: as documented    Patient Active Problem List   Diagnosis     Hypertension     Obstructive sleep apnea     Prediabetes     Left bundle branch block     No past surgical history on file.    Social History   Substance Use Topics     Smoking status: Current Some Day Smoker     Years: 8.00     Types: Cigars     Start date: 6/15/2008     Smokeless tobacco: Never Used     Alcohol use Yes      Comment: 7     Family History   Problem Relation Age of Onset     CANCER Mother      DIABETES Father      HEART DISEASE Father          Current Outpatient Prescriptions   Medication Sig Dispense Refill     azithromycin (ZITHROMAX) 250 MG tablet Two tablets first day, then one tablet daily for four days. 6 tablet 0     guaiFENesin-codeine (ROBITUSSIN AC) 100-10 MG/5ML SOLN solution Take 10 mLs by mouth every 4 hours as needed for cough 180 mL 0     hydrocortisone (WESTCORT) 0.2 % cream Apply sparingly to affected area three times daily for 14 days. 30 g 1     hydrochlorothiazide (HYDRODIURIL) 25 MG tablet  "Take 1 tablet (25 mg) by mouth daily 90 tablet 4     propranolol (INDERAL) 40 MG tablet Take one-half tab in the AM and one-half tab in the PM.  Needs BP recheck for further refills 90 tablet 0     [DISCONTINUED] hydrochlorothiazide (MICROZIDE) 12.5 MG capsule Take 1 capsule (12.5 mg) by mouth daily 90 capsule 3     predniSONE (DELTASONE) 20 MG tablet Take 2 po together once daily for 5 days, then 1 po daily for 5 more days 15 tablet 1     ketoconazole (NIZORAL) 2 % cream Apply topically 2 times daily 30 g 1     omeprazole (PRILOSEC) 20 MG CR capsule Take 1 capsule (20 mg) by mouth daily 90 capsule 3     modafinil (PROVIGIL) 100 MG tablet Take 100 mg by mouth daily       No Known Allergies  BP Readings from Last 3 Encounters:   04/11/18 149/84   04/10/18 148/82   02/22/18 151/80    Wt Readings from Last 3 Encounters:   04/11/18 261 lb 12 oz (118.7 kg)   04/10/18 267 lb (121.1 kg)   03/05/18 260 lb (117.9 kg)            Reviewed and updated as needed this visit by clinical staff  Tobacco  Allergies  Meds  Problems       Reviewed and updated as needed this visit by Provider  Allergies  Meds  Problems         ROS:  Constitutional, HEENT, cardiovascular, pulmonary, gi and gu systems are negative, except as otherwise noted.    OBJECTIVE:     /82  Pulse 62  Temp 98.2  F (36.8  C) (Oral)  Ht 5' 7.99\" (172.7 cm)  Wt 267 lb (121.1 kg)  SpO2 95%  BMI 40.61 kg/m2  Body mass index is 40.61 kg/(m^2).  GENERAL: healthy, alert and no distress  EYES: Eyes grossly normal to inspection, PERRL and conjunctivae and sclerae normal  HENT: ear canals and TM's normal, nose and mouth without ulcers or lesions  NECK: no adenopathy, no asymmetry, masses, or scars and thyroid normal to palpation  RESP: lungs clear to auscultation - no rales, rhonchi or wheezes  CV: regular rate and rhythm, normal S1 S2, no S3 or S4, no murmur, click or rub, no peripheral edema and peripheral pulses strong  SKIN: no suspicious lesions or " rashes      ASSESSMENT/PLAN:       ICD-10-CM    1. Acute bronchitis with symptoms > 10 days J20.9 azithromycin (ZITHROMAX) 250 MG tablet     guaiFENesin-codeine (ROBITUSSIN AC) 100-10 MG/5ML SOLN solution   2. Essential hypertension I10    3. Facial rash R21 hydrocortisone (WESTCORT) 0.2 % cream       Patient Instructions   You have been prescribed an antibiotic to treat a bacterial infection. Watch for signs of allergic reaction including swelling around the mouth or eyes and the development of a rash.  If you develop any of these symptoms, stop your medication, take a benadryl (25-50 mg) and give our office a call. Consider probiotic therapy to decrease the possibility of diarrhea associated with antibiotic use.    Use cough medication with codeine at night before bed. This is a narcotic cough syrup and it should not be taken with alcohol and you should not drive while taking. During the day you take Mucinex DM.  Drink lots of fluids.    Avoid decongestants    Your blood pressure was elevated today.  Avoiud decongestants and check BP at home over the next couple weeks.    Please let me know if you have any questions.  Thank you for allowing me to participate in your care.  It was my pleasure meeting you.  Lindsay Chin PA-C  [facility]

## 2018-04-10 NOTE — MR AVS SNAPSHOT
After Visit Summary   4/10/2018    Dev Martinez    MRN: [id number]           Patient Information     Date Of Birth          1950        Visit Information        Provider Department      4/10/2018 1:40 PM Mirian Chin PA-C [facility]        Today's Diagnoses     Essential hypertension    -  1    Acute bronchitis with symptoms > 10 days          Care Instructions    You have been prescribed an antibiotic to treat a bacterial infection. Watch for signs of allergic reaction including swelling around the mouth or eyes and the development of a rash.  If you develop any of these symptoms, stop your medication, take a benadryl (25-50 mg) and give our office a call. Consider probiotic therapy to decrease the possibility of diarrhea associated with antibiotic use.    Use cough medication with codeine at night before bed. During the day you take Mucinex DM.  Drink lots of fluids.    Avoid decongestants    Your blood pressure was elevated today.  Avoiud decongestants and check BP at home over the next couple weeks.    Please let me know if you have any questions.  Thank you for allowing me to participate in your care.  It was my pleasure meeting you.  Lindsay Chin PA-C              Follow-ups after your visit        Your next 10 appointments already scheduled     Apr 11, 2018  8:00 AM CDT   PHYSICAL with Behzad Pisano MD   [facility] ([facility])    [place] [address]   [phone]              Who to contact     Please call your clinic at [phone] to:    Ask questions about your health    Make or cancel appointments    Discuss your medicines    Learn about your test results    Speak to your doctor            Additional Information About Your Visit        MyChart Information     [employer] gives you secure access to your electronic health record. If you see a primary care provider, you can also send messages to  "your care team and make appointments. If you have questions, please call your primary care clinic.  If you do not have a primary care provider, please call [phone] and they will assist you.      [employer] is an electronic gateway that provides easy, online access to your medical records. With [employer], you can request a clinic appointment, read your test results, renew a prescription or communicate with your care team.     To access your existing account, please contact your [facility] or call [phone] for assistance.        Care EveryWhere ID     This is your Care EveryWhere ID. This could be used by other organizations to access your [facility] medical records  [id number]        Your Vitals Were     Pulse Temperature Height Pulse Oximetry BMI (Body Mass Index)       62 98.2  F (36.8  C) (Oral) 5' 7.99\" (172.7 cm) 95% 40.61 kg/m2        Blood Pressure from Last 3 Encounters:   04/10/18 151/88   02/22/18 151/80   02/20/18 162/86    Weight from Last 3 Encounters:   04/10/18 267 lb (121.1 kg)   03/05/18 260 lb (117.9 kg)   02/22/18 263 lb (119.3 kg)              Today, you had the following     No orders found for display         Today's Medication Changes          These changes are accurate as of 4/10/18  2:03 PM.  If you have any questions, ask your nurse or doctor.               Start taking these medicines.        Dose/Directions    azithromycin 250 MG tablet   Commonly known as:  ZITHROMAX   Used for:  Acute bronchitis with symptoms > 10 days   Started by:  Mirian Chin PA-C        Two tablets first day, then one tablet daily for four days.   Quantity:  6 tablet   Refills:  0       guaiFENesin-codeine 100-10 MG/5ML Soln solution   Commonly known as:  ROBITUSSIN AC   Used for:  Acute bronchitis with symptoms > 10 days   Started by:  Mirian Chin PA-C        Dose:  2 tsp.   Take 10 mLs by mouth every 4 hours as needed for cough   Quantity:  180 mL   Refills:  0 "            Where to get your medicines      These medications were sent to [place] [address] [facility] - [place], [place] - [address]  [address], [facility] [address]     Phone:  [phone]     azithromycin 250 MG tablet         Some of these will need a paper prescription and others can be bought over the counter.  Ask your nurse if you have questions.     Bring a paper prescription for each of these medications     guaiFENesin-codeine 100-10 MG/5ML Soln solution                Primary Care Provider Office Phone # Fax #    Behzad Pisano [phone] [address] [place] [address]        Equal Access to Services     [facility] : Hadii yaritza guerrier hadasho Sojaspreetali, waaxda luqadaha, qaybta kaalmada adeelieyada, kat cruz . So [facility] [phone].    ATENCIÓN: Si habla español, tiene a prasad disposición servicios gratuitos de asistencia lingüística. [facility] [phone].    We comply with applicable federal civil rights laws and [place] laws. We do not discriminate on the basis of race, color, national origin, age, disability, sex, sexual orientation, or gender identity.            Thank you!     Thank you for choosing [facility]  for your care. Our goal is always to provide you with excellent care. Hearing back from our patients is one way we can continue to improve our services. Please take a few minutes to complete the written survey that you may receive in the mail after your visit with us. Thank you!             Your Updated Medication List - Protect others around you: Learn how to safely use, store and throw away your medicines at www.disposemymeds.org.          This list is accurate as of 4/10/18  2:03 PM.  Always use your most recent med list.                   Brand Name Dispense Instructions for use Diagnosis    azithromycin 250 MG tablet    ZITHROMAX    6 tablet    Two tablets first day, then one tablet daily for four days.    Acute  bronchitis with symptoms > 10 days       guaiFENesin-codeine 100-10 MG/5ML Soln solution    ROBITUSSIN AC    180 mL    Take 10 mLs by mouth every 4 hours as needed for cough    Acute bronchitis with symptoms > 10 days       hydrochlorothiazide 12.5 MG capsule    MICROZIDE    90 capsule    Take 1 capsule (12.5 mg) by mouth daily    Essential hypertension       hydrocortisone 0.2 % cream    WESTCORT    30 g    Apply sparingly to affected area three times daily for 14 days.    Facial rash       ketoconazole 2 % cream    NIZORAL    30 g    Apply topically 2 times daily    Tinea cruris       modafinil 100 MG tablet    PROVIGIL     Take 100 mg by mouth daily        omeprazole 20 MG CR capsule    priLOSEC    90 capsule    Take 1 capsule (20 mg) by mouth daily    Gastroesophageal reflux disease without esophagitis       predniSONE 20 MG tablet    DELTASONE    15 tablet    Take 2 po together once daily for 5 days, then 1 po daily for 5 more days    Acute idiopathic gout of left foot       propranolol 40 MG tablet    INDERAL    90 tablet    Take one-half tab in the AM and one-half tab in the PM.  Needs BP recheck for further refills    Essential hypertension

## 2018-04-10 NOTE — NURSING NOTE
"68 year old  Chief Complaint   Patient presents with     Cough     mainly a dry cough. has been coughing for about 10 days. Pt has been taking OTC medicine to help.       Blood pressure 151/88, pulse 62, temperature 98.2  F (36.8  C), temperature source Oral, height 5' 7.99\" (172.7 cm), weight 267 lb (121.1 kg), SpO2 95 %. Body mass index is 40.61 kg/(m^2).  Patient Active Problem List   Diagnosis     Hypertension     Obstructive sleep apnea     Prediabetes     Left bundle branch block       Wt Readings from Last 2 Encounters:   04/10/18 267 lb (121.1 kg)   03/05/18 260 lb (117.9 kg)     BP Readings from Last 3 Encounters:   04/10/18 151/88   02/22/18 151/80   02/20/18 162/86         Current Outpatient Prescriptions   Medication     propranolol (INDERAL) 40 MG tablet     hydrochlorothiazide (MICROZIDE) 12.5 MG capsule     predniSONE (DELTASONE) 20 MG tablet     ketoconazole (NIZORAL) 2 % cream     omeprazole (PRILOSEC) 20 MG CR capsule     modafinil (PROVIGIL) 100 MG tablet     hydrocortisone (WESTCORT) 0.2 % cream     No current facility-administered medications for this visit.        Social History   Substance Use Topics     Smoking status: Current Some Day Smoker     Years: 8.00     Types: Cigars     Start date: 6/15/2008     Smokeless tobacco: Never Used     Alcohol use Yes      Comment: 7       Health Maintenance Due   Topic Date Due     ADVANCE DIRECTIVE PLANNING Q5 YRS  03/03/2007     PNEUMOCOCCAL (1 of 2 - PCV13) 03/03/2017       No results found for: PAP      April 10, 2018 1:43 PM  "

## 2018-04-11 ENCOUNTER — OFFICE VISIT (OUTPATIENT)
Dept: FAMILY MEDICINE | Facility: CLINIC | Age: 68
End: 2018-04-11
Payer: COMMERCIAL

## 2018-04-11 VITALS
HEIGHT: 68 IN | TEMPERATURE: 98.3 F | WEIGHT: 261.75 LBS | DIASTOLIC BLOOD PRESSURE: 84 MMHG | BODY MASS INDEX: 39.67 KG/M2 | HEART RATE: 55 BPM | SYSTOLIC BLOOD PRESSURE: 149 MMHG | OXYGEN SATURATION: 97 %

## 2018-04-11 DIAGNOSIS — M10.9 ACUTE GOUTY ARTHRITIS: ICD-10-CM

## 2018-04-11 DIAGNOSIS — Z13.220 SCREENING CHOLESTEROL LEVEL: ICD-10-CM

## 2018-04-11 DIAGNOSIS — Z00.00 MEDICARE ANNUAL WELLNESS VISIT, SUBSEQUENT: Primary | ICD-10-CM

## 2018-04-11 DIAGNOSIS — I10 ESSENTIAL HYPERTENSION: ICD-10-CM

## 2018-04-11 DIAGNOSIS — H90.3 BILATERAL SENSORINEURAL HEARING LOSS: ICD-10-CM

## 2018-04-11 DIAGNOSIS — Z12.5 SCREENING FOR PROSTATE CANCER: ICD-10-CM

## 2018-04-11 DIAGNOSIS — Z13.1 SCREENING FOR DIABETES MELLITUS: ICD-10-CM

## 2018-04-11 DIAGNOSIS — Z23 NEED FOR PNEUMOCOCCAL VACCINATION: ICD-10-CM

## 2018-04-11 LAB
CHOLEST SERPL-MCNC: 154 MG/DL
HDLC SERPL-MCNC: 38 MG/DL
LDLC SERPL CALC-MCNC: 87 MG/DL
NONHDLC SERPL-MCNC: 116 MG/DL
PSA SERPL-ACNC: 1.11 UG/L (ref 0–4)
TRIGL SERPL-MCNC: 146 MG/DL
URATE SERPL-MCNC: 6.6 MG/DL (ref 3.5–7.2)

## 2018-04-11 RX ORDER — HYDROCHLOROTHIAZIDE 25 MG/1
25 TABLET ORAL DAILY
Qty: 90 TABLET | Refills: 4 | Status: SHIPPED | OUTPATIENT
Start: 2018-04-11 | End: 2018-06-29

## 2018-04-11 NOTE — NURSING NOTE
"68 year old  Chief Complaint   Patient presents with     Physical     recheck blood pressure and medications       Blood pressure 154/83, pulse 59, temperature 98.3  F (36.8  C), temperature source Oral, height 5' 8.27\" (173.4 cm), weight 261 lb 12 oz (118.7 kg), SpO2 97 %. Body mass index is 39.49 kg/(m^2).  Patient Active Problem List   Diagnosis     Hypertension     Obstructive sleep apnea     Prediabetes     Left bundle branch block       Wt Readings from Last 2 Encounters:   04/11/18 261 lb 12 oz (118.7 kg)   04/10/18 267 lb (121.1 kg)     BP Readings from Last 3 Encounters:   04/11/18 154/83   04/10/18 148/82   02/22/18 151/80         Current Outpatient Prescriptions   Medication     azithromycin (ZITHROMAX) 250 MG tablet     guaiFENesin-codeine (ROBITUSSIN AC) 100-10 MG/5ML SOLN solution     hydrocortisone (WESTCORT) 0.2 % cream     propranolol (INDERAL) 40 MG tablet     hydrochlorothiazide (MICROZIDE) 12.5 MG capsule     predniSONE (DELTASONE) 20 MG tablet     ketoconazole (NIZORAL) 2 % cream     omeprazole (PRILOSEC) 20 MG CR capsule     modafinil (PROVIGIL) 100 MG tablet     No current facility-administered medications for this visit.        Social History   Substance Use Topics     Smoking status: Current Some Day Smoker     Years: 8.00     Types: Cigars     Start date: 6/15/2008     Smokeless tobacco: Never Used     Alcohol use Yes      Comment: 7       Health Maintenance Due   Topic Date Due     ADVANCE DIRECTIVE PLANNING Q5 YRS  03/03/2007     PNEUMOCOCCAL (1 of 2 - PCV13) 03/03/2017       No results found for: PAP      April 11, 2018 8:06 AM    Screening Questionnaire for Adult Immunization    Are you sick today?   No   Do you have allergies to medications, food, a vaccine component or latex?   No   Have you ever had a serious reaction after receiving a vaccination?   No   Do you have a long-term health problem with heart disease, lung disease, asthma, kidney disease, metabolic disease (e.g. " diabetes), anemia, or other blood disorder?   No   Do you have cancer, leukemia, HIV/AIDS, or any other immune system problem?   No   In the past 3 months, have you taken medications that affect  your immune system, such as prednisone, other steroids, or anticancer drugs; drugs for the treatment of rheumatoid arthritis, Crohn s disease, or psoriasis; or have you had radiation treatments?   No   Have you had a seizure, or a brain or other nervous system problem?   No   During the past year, have you received a transfusion of blood or blood     products, or been given immune (gamma) globulin or antiviral drug?   No   For women: Are you pregnant or is there a chance you could become        pregnant during the next month?   No   Have you received any vaccinations in the past 4 weeks?   No     Immunization questionnaire answers were all negative.        Per orders of Dr. Pisano, injection of PCV 13 given by Estee Ferguson. Patient instructed to remain in clinic for 15 minutes afterwards, and to report any adverse reaction to me immediately.       Screening performed by Estee Ferguson on 4/11/2018 at 8:55 AM.

## 2018-04-11 NOTE — MR AVS SNAPSHOT
After Visit Summary   4/11/2018    Dev Martinez    MRN: [id number]           Patient Information     Date Of Birth          1950        Visit Information        Provider Department      4/11/2018 8:00 AM Behzad Pisano MD [facility]        Today's Diagnoses     Medicare annual wellness visit, subsequent    -  1    Screening for diabetes mellitus        Screening cholesterol level        Screening for prostate cancer        Need for pneumococcal vaccination        Essential hypertension        Bilateral sensorineural hearing loss        Acute gouty arthritis           Follow-ups after your visit        Additional Services     AUDIOLOGY ADULT REFERRAL       Your provider has referred you to: [facility] [phone]   [email or web address]    Specialty Testing:  Audiogram Only                  Future tests that were ordered for you today     Open Future Orders        Priority Expected Expires Ordered    AUDIOLOGY ADULT REFERRAL Routine  4/11/2019 4/11/2018            Who to contact     Please call your clinic at [phone] to:    Ask questions about your health    Make or cancel appointments    Discuss your medicines    Learn about your test results    Speak to your doctor            Additional Information About Your Visit        MyChart Information     [employer] gives you secure access to your electronic health record. If you see a primary care provider, you can also send messages to your care team and make appointments. If you have questions, please call your primary care clinic.  If you do not have a primary care provider, please call [phone] and they will assist you.      [employer] is an electronic gateway that provides easy, online access to your medical records. With [employer], you can request a clinic appointment, read your test results, renew a prescription or communicate  "with your care team.     To access your existing account, please contact your [facility] or call [phone] for assistance.        Care EveryWhere ID     This is your Care EveryWhere ID. This could be used by other organizations to access your [facility] medical records  [id number]        Your Vitals Were     Pulse Temperature Height Pulse Oximetry BMI (Body Mass Index)       55 98.3  F (36.8  C) (Oral) 5' 8.27\" (173.4 cm) 97% 39.49 kg/m2        Blood Pressure from Last 3 Encounters:   04/11/18 149/84   04/10/18 148/82   02/22/18 151/80    Weight from Last 3 Encounters:   04/11/18 261 lb 12 oz (118.7 kg)   04/10/18 267 lb (121.1 kg)   03/05/18 260 lb (117.9 kg)              We Performed the Following     ADMIN: Vaccine, Initial (62615)     Lipid Profile     Pneumococcal vaccine 13 valent PCV13 IM (Prevnar) [44543]     Prostate spec antigen screen     Uric acid          Today's Medication Changes          These changes are accurate as of 4/11/18  9:53 AM.  If you have any questions, ask your nurse or doctor.               Start taking these medicines.        Dose/Directions    hydrochlorothiazide 25 MG tablet   Commonly known as:  HYDRODIURIL   Used for:  Essential hypertension   Replaces:  hydrochlorothiazide 12.5 MG capsule   Started by:  Behzad Pisano MD        Dose:  25 mg   Take 1 tablet (25 mg) by mouth daily   Quantity:  90 tablet   Refills:  4         Stop taking these medicines if you haven't already. Please contact your care team if you have questions.     hydrochlorothiazide 12.5 MG capsule   Commonly known as:  MICROZIDE   Replaced by:  hydrochlorothiazide 25 MG tablet   Stopped by:  Behzad Pisano MD                Where to get your medicines      These medications were sent to [place] [address] [place] [address]  [address] [place] [address]     Phone:  [phone]     hydrochlorothiazide 25 MG tablet                Primary " Care Provider Office Phone # Fax #    Behzad Pisano [phone]       [address] [place] [address]        Equal Access to Services     TULIO LU : Hadvandana yaritza guerrier mikeo Artie, waaxda luqadaha, qaybta kaalmada louise, kat sharma juanitoelie allred nancy olsen. So [facility] [phone].    ATENCIÓN: Si habla español, tiene a prasad disposición servicios gratuitos de asistencia lingüística. Scotame al [phone].    We comply with applicable federal civil rights laws and [place] laws. We do not discriminate on the basis of race, color, national origin, age, disability, sex, sexual orientation, or gender identity.            Thank you!     Thank you for choosing [facility]  for your care. Our goal is always to provide you with excellent care. Hearing back from our patients is one way we can continue to improve our services. Please take a few minutes to complete the written survey that you may receive in the mail after your visit with us. Thank you!             Your Updated Medication List - Protect others around you: Learn how to safely use, store and throw away your medicines at www.disposemymeds.org.          This list is accurate as of 4/11/18  9:53 AM.  Always use your most recent med list.                   Brand Name Dispense Instructions for use Diagnosis    azithromycin 250 MG tablet    ZITHROMAX    6 tablet    Two tablets first day, then one tablet daily for four days.    Acute bronchitis with symptoms > 10 days       guaiFENesin-codeine 100-10 MG/5ML Soln solution    ROBITUSSIN AC    180 mL    Take 10 mLs by mouth every 4 hours as needed for cough    Acute bronchitis with symptoms > 10 days       hydrochlorothiazide 25 MG tablet    HYDRODIURIL    90 tablet    Take 1 tablet (25 mg) by mouth daily    Essential hypertension       hydrocortisone 0.2 % cream    WESTCORT    30 g    Apply sparingly to affected area three times daily for 14 days.    Facial rash        ketoconazole 2 % cream    NIZORAL    30 g    Apply topically 2 times daily    Tinea cruris       modafinil 100 MG tablet    PROVIGIL     Take 100 mg by mouth daily        omeprazole 20 MG CR capsule    priLOSEC    90 capsule    Take 1 capsule (20 mg) by mouth daily    Gastroesophageal reflux disease without esophagitis       predniSONE 20 MG tablet    DELTASONE    15 tablet    Take 2 po together once daily for 5 days, then 1 po daily for 5 more days    Acute idiopathic gout of left foot       propranolol 40 MG tablet    INDERAL    90 tablet    Take one-half tab in the AM and one-half tab in the PM.  Needs BP recheck for further refills    Essential hypertension

## 2018-05-06 NOTE — PROGRESS NOTES
"CHIEF COMPLAINT:  Medicare annual wellness visit, subsequent.      HISTORY OF PRESENT ILLNESS:  Dev is a 68-year-old gentleman here for the above.  Overall, he is doing fairly well.  He had a significant cough recently and saw my colleague, and he has gotten better as the days have gone by.      ACTIVE MEDICAL PROBLEMS:  Essential hypertension, prediabetes, history of left bundle branch block and obstructive sleep apnea, on CPAP.  For the most part, things are going well along those lines.      CURRENT MEDICATIONS:  Reviewed.  He is on propranolol 20 mg b.i.d. for 40 mg per day equivalent.  He is on hydrochlorothiazide.  He is on modafinil (Provigil) 100 mg and wonders if that is enough.  I am going to recommend that he talk to his Sleep doctor about increasing that dose.  He does continue to feel \"run down.\"  His Sleep Clinic is in [place].        MEDICARE QUESTIONS:  No problems with any of his activities of daily living.  He is now walking at work, and he is really trying to get more active, and he wants to lose some weight.  He has had no falls at home in the last year.  He has no change to his symptoms of stress or depression.  He meets with a therapist every 2 weeks.  He has had no problems with any memory loss.      SOCIAL, FAMILY AND PAST SURGICAL HISTORIES:  Unchanged.      HEALTH CARE MAINTENANCE:  He wears glasses, and his eyes are checked on an annual basis.  His hearing has been checked recently and is good.  Dental care is up-to-date.  Blood pressure is not as good as we would like to see.  It was 154/83, and upon recheck it was 149/84.  He is just on 12.5 mg of hydrochlorothiazide, so we talked about increasing that to a full 25 mg.  He is willing to do so.  Body mass index is 39.49.  Weight is down 6 pounds from yesterday, although that does not really make sense.  He apparently is wearing lighter clothing and lighter shoes.  From an immunization standpoint, he needs PCV 13, so we are going " to go ahead and give that to him today.  He should get a 2 shot Shingrix series and will do so at a pharmacy.  From a colon cancer screening standpoint, he is up-to-date until 2026.  Tetanus booster is up-to-date.  He will get a flu shot in the fall.  He is up-to-date with hepatitis C screening.  Today we will do a lipid panel, PSA 50 and a uric acid level.      OBJECTIVE:     GENERAL:  Dev is in no distress.  Breathing comfortably.  Affect upbeat.  He is alert and oriented x3.   VITAL SIGNS:  Best blood pressure combination was 149/83.  We are going to go ahead and increase his hydrochlorothiazide as mentioned.  Pulse is 59 and regular.  Temperature is 98.3.  He is 5 feet 8.3 and weighs 261 pounds 12 ounces with a BMI of 39.49.  Weight is heading in the right direction, and he is really trying to work on that.  O2 sats are 97% on room air.     HEENT:  Head is normocephalic, atraumatic.  Ears are free of any cerumen.  The TMs look fine.  There is no pain with palpation of the frontal or maxillary sinuses.  Eyes are grossly normal.  Nose is free of any congestion or discharge.  Teeth are in good repair.  Mucous membranes are moist.  Throat looks benign.     NECK:  Supple without adenopathy or thyromegaly.  No carotid bruits are heard, no JVD.   BACK:  Smooth and straight.  No pain to percussion.  No CVA tenderness.   LUNGS:  Clear to auscultation bilaterally.   HEART:  Regular rate and rhythm without murmur.   ABDOMEN:  Benign.   EXTREMITIES:  Ankles are free of any edema.  Skin is warm and dry.  Good peripheral pulses.      ASSESSMENT/PLAN:   1.  Medicare annual wellness visit, subsequent.    2.  PCV 13 given today.   3.  I would recommend shingles vaccine series through his pharmacy.   4.  Essential hypertension at less than ideal control.  Doubling his hydrochlorothiazide from 12.5-25 mg once daily.   5.  Bilateral sensorineural hearing loss, stable.  Ears have been checked.   6.  History of acute gouty  arthritis.  Uric acid level pending.   7.  PSA 50 for prostate cancer screening purposes.   8.  Screening for hyperlipidemia and diabetes with a lipid panel and a glucose as previously done.   9.  Call with any problems or questions.

## 2018-05-10 DIAGNOSIS — K21.9 GASTROESOPHAGEAL REFLUX DISEASE WITHOUT ESOPHAGITIS: ICD-10-CM

## 2018-05-10 NOTE — TELEPHONE ENCOUNTER
Last office visit: 04/11/2018, no future appointment.     Prescription approved per [facility] Refill Protocol.

## 2018-05-16 ENCOUNTER — TELEPHONE (OUTPATIENT)
Dept: FAMILY MEDICINE | Facility: CLINIC | Age: 68
End: 2018-05-16

## 2018-05-16 NOTE — TELEPHONE ENCOUNTER
----- Message from Ewelina Crandall, RN sent at 5/16/2018  4:04 PM CDT -----  Regarding: FW: medical issues   Contact: [phone]  LM for pt to call back - likely needs visit  ----- Message -----     From: Luz Michaud     Sent: 5/16/2018   9:51 AM       To: [facility] Nurse Pool  Subject: medical issues                                   Patient is having some issues with the gout in his left toe and he stated he has noticed a warm/ hot feeling around his ankle and shin area multiple times during the day. He wanted to know if this is something he should come in for or if Dr. Pisano could just call something in for him     Please call patient back at [phone]    Thank you!    Luz Michaud  Senior Intake representative   Call Center

## 2018-05-17 ENCOUNTER — OFFICE VISIT (OUTPATIENT)
Dept: FAMILY MEDICINE | Facility: CLINIC | Age: 68
End: 2018-05-17
Payer: COMMERCIAL

## 2018-05-17 VITALS
DIASTOLIC BLOOD PRESSURE: 83 MMHG | BODY MASS INDEX: 39.93 KG/M2 | SYSTOLIC BLOOD PRESSURE: 146 MMHG | HEIGHT: 68 IN | WEIGHT: 263.5 LBS | OXYGEN SATURATION: 96 % | TEMPERATURE: 97.8 F | HEART RATE: 51 BPM

## 2018-05-17 DIAGNOSIS — M10.072 ACUTE IDIOPATHIC GOUT OF LEFT FOOT: Primary | ICD-10-CM

## 2018-05-17 DIAGNOSIS — R20.8 BURNING SENSATION IN LOWER EXTREMITY: ICD-10-CM

## 2018-05-17 RX ORDER — PREDNISONE 20 MG/1
TABLET ORAL
Qty: 15 TABLET | Refills: 1 | Status: SHIPPED | OUTPATIENT
Start: 2018-05-17 | End: 2018-08-03

## 2018-05-17 ASSESSMENT — PAIN SCALES - GENERAL: PAINLEVEL: MILD PAIN (2)

## 2018-05-17 NOTE — PROGRESS NOTES
"CHIEF COMPLAINT:  Burning sensation in right shin and gout in left great toe.      HISTORY OF PRESENT ILLNESS:  Dev is a 68-year-old gentleman who was just here a month ago with me.  He has a history of presumed gout.  His serum uric acid level has actually been normal.  He went to our Ortho Walk[facility]In Clinic and attempted to have his left great metatarsophalangeal joint aspirated under ultrasound guidance, and unfortunately there was too little fluid to be obtained to look for crystals under the polarizing microscope.  He responds well to prednisone.      On Saturday, 05/06, he knew that he was getting a flare in that left great toe joint.  He took the equivalent of 40 mg of prednisone daily for 5 days, then 20 mg daily for 4 days and then stopped.  It did the trick.  Then yesterday, 05/16, he developed a sudden sense of heat on his right shin.  It felt as though there was a \"hot breeze\" blowing across it that lasted just a second or 2.  It was so quick that he could not actually check the temperature of his leg.  He was at work and was wearing slacks at the time.  This happened at least 8 times throughout the day.  It was always very fleeting.  It is not clear if it has any connection with his left great toe.  In addition to this feeling, the left great toe joint started to bother him yesterday, so he is about to get a gout flare again.      ACTIVE MEDICAL PROBLEMS:  Reviewed.      CURRENT MEDICATIONS:  Reviewed.      OBJECTIVE:  Dev is in no distress.  Affect is upbeat.  Vital signs are stable.  When I entered the room, he had his shoes off, and both legs were exposed.  There is no asymmetry from side to side, although the first metatarsophalangeal joint of the left foot does look a little larger and mildly erythematous.  However, there is no heat disparity or discrepancy from side to side.  He has good movement.  It is really not that swollen at this point.  Examination of the right shin shows absolutely no " "changes and certainly no asymmetry when compared to the left shin.  He can feel everything, including light touch sensation.  When he is not having that sensation that there is something like a hot wind blowing across his leg, he has absolutely no symptoms whatsoever.  Throughout our entire visit, he felt fine.  (Of note, just as he was leaving and I was in a different part of the clinic, he mentioned to our  that it happened again, and that when he felt his skin, it did not feel unusually warm.)      ASSESSMENT/PLAN:   1.  Possible recurrent gout in the left great toe joint.  We have never confirmed this with crystals.  His serum uric acid level is actually normal.  Prednisone works the best for him to quell this pain.      Given all that, he is going to hold off on using prednisone for at least the next 24-48 hours.  If the toe really flares up and turns red and swells, I think that would be the perfect time to go into the Walk-In Ortho Clinic to have the joint aspirated and to look for crystals (yellow, gout; blue, pseudogout).  Of course, the actual finding would help to determine what the problem is.  It is very possible he did not have gout to begin with given his normal serum uric acid level.     2.  \"Hot breeze\" sensation on his right shin.  Fleeting and very temporary.  He has had absolutely no other symptoms, such as facial asymmetry, trouble with his speech, arm drift or anything else that would suggest a stroke.  It is really unclear what he is experiencing.  Meds were reviewed.  I do not have an easy answer.  We will simply see how he does over the coming days.  It will be interesting to know how that responds if he does go on another course of prednisone if indeed he is getting a flare of his gout.  We will be in touch electronically through [employer] in the coming days as to next steps.       "

## 2018-05-17 NOTE — MR AVS SNAPSHOT
"              After Visit Summary   5/17/2018    Dev Martinez    MRN: [id number]           Patient Information     Date Of Birth          1950        Visit Information        Provider Department      5/17/2018 11:20 AM Behzad Pisano MD [facility]        Today's Diagnoses     Acute idiopathic gout of left foot    -  1    Burning sensation in lower extremity           Follow-ups after your visit        Follow-up notes from your care team     Return if symptoms worsen or fail to improve.      Who to contact     Please call your clinic at [phone] to:    Ask questions about your health    Make or cancel appointments    Discuss your medicines    Learn about your test results    Speak to your doctor            Additional Information About Your Visit        [employer] Information     [employer] gives you secure access to your electronic health record. If you see a primary care provider, you can also send messages to your care team and make appointments. If you have questions, please call your primary care clinic.  If you do not have a primary care provider, please call [phone] and they will assist you.      [employer] is an electronic gateway that provides easy, online access to your medical records. With [employer], you can request a clinic appointment, read your test results, renew a prescription or communicate with your care team.     To access your existing account, please contact your [facility] or call [phone] for assistance.        Care EveryWhere ID     This is your Care EveryWhere ID. This could be used by other organizations to access your [facility] medical records  [id number]        Your Vitals Were     Pulse Temperature Height Pulse Oximetry BMI (Body Mass Index)       51 97.8  F (36.6  C) (Tympanic) 5' 8.27\" (173.4 cm) 96% 39.75 kg/m2        Blood Pressure from Last 3 Encounters:   05/17/18 146/83   04/11/18 149/84   04/10/18 148/82    Weight from Last 3 " Encounters:   05/17/18 263 lb 8 oz (119.5 kg)   04/11/18 261 lb 12 oz (118.7 kg)   04/10/18 267 lb (121.1 kg)              Today, you had the following     No orders found for display         Where to get your medicines      These medications were sent to [facility] - [place], [place] - [address]  [address], [facility] [address]     Phone:  [phone]     predniSONE 20 MG tablet          Primary Care Provider Office Phone # Fax #    Behzad Pisano [phone] [address] [place] [address]        Equal Access to Services     LEIA LU : Patrick Alva, walincoln braun, qaandres kaalmada louise, kat olsen. So [facility] [phone].    ATENCIÓN: Si habla español, tiene a prasad disposición servicios gratuitos de asistencia lingüística. Llame al [phone].    We comply with applicable federal civil rights laws and [place] laws. We do not discriminate on the basis of race, color, national origin, age, disability, sex, sexual orientation, or gender identity.            Thank you!     Thank you for choosing [facility]  for your care. Our goal is always to provide you with excellent care. Hearing back from our patients is one way we can continue to improve our services. Please take a few minutes to complete the written survey that you may receive in the mail after your visit with us. Thank you!             Your Updated Medication List - Protect others around you: Learn how to safely use, store and throw away your medicines at www.disposemymeds.org.          This list is accurate as of 5/17/18  1:16 PM.  Always use your most recent med list.                   Brand Name Dispense Instructions for use Diagnosis    azithromycin 250 MG tablet    ZITHROMAX    6 tablet    Two tablets first day, then one tablet daily for four days.    Acute bronchitis with symptoms > 10 days       guaiFENesin-codeine 100-10 MG/5ML  Soln solution    ROBITUSSIN AC    180 mL    Take 10 mLs by mouth every 4 hours as needed for cough    Acute bronchitis with symptoms > 10 days       hydrochlorothiazide 25 MG tablet    HYDRODIURIL    90 tablet    Take 1 tablet (25 mg) by mouth daily    Essential hypertension       hydrocortisone 0.2 % cream    WESTCORT    30 g    Apply sparingly to affected area three times daily for 14 days.    Facial rash       ketoconazole 2 % cream    NIZORAL    30 g    Apply topically 2 times daily    Tinea cruris       modafinil 100 MG tablet    PROVIGIL     Take 100 mg by mouth daily        omeprazole 20 MG CR capsule    priLOSEC    90 capsule    Take 1 capsule (20 mg) by mouth daily    Gastroesophageal reflux disease without esophagitis       predniSONE 20 MG tablet    DELTASONE    15 tablet    Take 2 po together once daily for 5 days, then 1 po daily for 5 more days    Acute idiopathic gout of left foot       propranolol 40 MG tablet    INDERAL    90 tablet    Take one-half tab in the AM and one-half tab in the PM.  Needs BP recheck for further refills    Essential hypertension

## 2018-05-17 NOTE — NURSING NOTE
"68 year old  Chief Complaint   Patient presents with     RECHECK     Pt reports he has gout left big toe. Pain is a 2.     Leg Pain     Pt reports it feels like he has a \"hot flash\" on his right leg by his calf area. Like there is \"hot air\" blowing on it and then it goes away. Noticed it about two days ago for the first time. Pain s a 2.       Blood pressure 146/83, pulse 51, temperature 97.8  F (36.6  C), temperature source Tympanic, height 5' 8.27\" (173.4 cm), weight 263 lb 8 oz (119.5 kg), SpO2 96 %. Body mass index is 39.75 kg/(m^2).  Patient Active Problem List   Diagnosis     Hypertension     Obstructive sleep apnea     Prediabetes     Left bundle branch block       Wt Readings from Last 2 Encounters:   05/17/18 263 lb 8 oz (119.5 kg)   04/11/18 261 lb 12 oz (118.7 kg)     BP Readings from Last 3 Encounters:   05/17/18 146/83   04/11/18 149/84   04/10/18 148/82         Current Outpatient Prescriptions   Medication     azithromycin (ZITHROMAX) 250 MG tablet     guaiFENesin-codeine (ROBITUSSIN AC) 100-10 MG/5ML SOLN solution     hydrochlorothiazide (HYDRODIURIL) 25 MG tablet     hydrocortisone (WESTCORT) 0.2 % cream     ketoconazole (NIZORAL) 2 % cream     modafinil (PROVIGIL) 100 MG tablet     omeprazole (PRILOSEC) 20 MG CR capsule     predniSONE (DELTASONE) 20 MG tablet     propranolol (INDERAL) 40 MG tablet     No current facility-administered medications for this visit.        Social History   Substance Use Topics     Smoking status: Current Some Day Smoker     Years: 8.00     Types: Cigars     Start date: 6/15/2008     Smokeless tobacco: Never Used     Alcohol use Yes      Comment: 7       Health Maintenance Due   Topic Date Due     ADVANCE DIRECTIVE PLANNING Q5 YRS  03/03/2007       No results found for: KRISTINE eDal MA  May 17, 2018 11:49 AM    "

## 2018-05-18 ENCOUNTER — OFFICE VISIT (OUTPATIENT)
Dept: ORTHOPEDICS | Facility: CLINIC | Age: 68
End: 2018-05-18
Payer: COMMERCIAL

## 2018-05-18 VITALS — BODY MASS INDEX: 39.86 KG/M2 | HEIGHT: 68 IN | WEIGHT: 263 LBS

## 2018-05-18 DIAGNOSIS — M19.079 ARTHRITIS OF GREAT TOE AT METATARSOPHALANGEAL JOINT: Primary | ICD-10-CM

## 2018-05-18 LAB
APPEARANCE FLD: NORMAL
COLOR FLD: NORMAL
CRYSTALS SNV MICRO: ABNORMAL
MONOS+MACROS NFR FLD MANUAL: 23 %
NEUTS BAND NFR FLD MANUAL: 77 %
RBC # FLD: NORMAL /UL
SPECIMEN SOURCE FLD: NORMAL
WBC # FLD AUTO: NORMAL /UL

## 2018-05-18 NOTE — MR AVS SNAPSHOT
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 If you see a primary care provider, you can also send messages to your care team and make appointments. If you have questions, please call your primary care clinic.  If you do not have a primary care provider, please call [phone] and they will assist you.      Snip2Code is an electronic gateway that provides easy, online access to your medical records. With Snip2Code, you can request a clinic appointment, read your test results, renew a prescription or communicate with your care team.     To access your existing account, please contact your [facility] or call [phone] for assistance.        Care EveryWhere ID     This is your Care EveryWhere ID. This could be used by other organizations to access your [place] medical records  [id number]        Your Vitals Were     Height BMI (Body Mass Index)                1.734 m (5' 8.27\") 39.67 kg/m2           Blood Pressure from Last 3 Encounters:   05/17/18 146/83   04/11/18 149/84   04/10/18 148/82    Weight from Last 3 Encounters:   05/18/18 119.3 kg (263 lb)   05/17/18 119.5 kg (263 lb 8 oz)   04/11/18 118.7 kg (261 lb 12 oz)              We Performed the Following     Cell count " with differential fluid     Crystal ID synovial fluid     DRAIN/INJECT SMALL JOINT/BURSA (Finger, Heel)     KENALOG PER 10 MG        Primary Care Provider Office Phone # Fax #    Behzad Pisano [phone] [address] [place] [phone]        Equal Access to Services     TULIO LU : Hadii aad ku hadasho Soomaali, waaxda luqadaha, qaybta kaalmada adeegyada, waxay pepein hayaan laquita alvarezkrishnarodney olsen. So [facility] [phone].    ATENCIÓN: Si habla español, tiene a prasad disposición servicios gratuitos de asistencia lingüística. [facility] [phone].    We comply with applicable federal civil rights laws and [place] laws. We do not discriminate on the basis of race, color, national origin, age, disability, sex, sexual orientation, or gender identity.            Thank you!     Thank you for choosing [facility]  for your care. Our goal is always to provide you with excellent care. Hearing back from our patients is one way we can continue to improve our services. Please take a few minutes to complete the written survey that you may receive in the mail after your visit with us. Thank you!             Your Updated Medication List - Protect others around you: Learn how to safely use, store and throw away your medicines at www.disposemymeds.org.          This list is accurate as of 5/18/18  4:28 PM.  Always use your most recent med list.                   Brand Name Dispense Instructions for use Diagnosis    azithromycin 250 MG tablet    ZITHROMAX    6 tablet    Two tablets first day, then one tablet daily for four days.    Acute bronchitis with symptoms > 10 days       guaiFENesin-codeine 100-10 MG/5ML Soln solution    ROBITUSSIN AC    180 mL    Take 10 mLs by mouth every 4 hours as needed for cough    Acute bronchitis with symptoms > 10 days       hydrochlorothiazide 25 MG tablet    HYDRODIURIL    90 tablet    Take 1 tablet (25 mg) by mouth daily    Essential  hypertension       hydrocortisone 0.2 % cream    WESTCORT    30 g    Apply sparingly to affected area three times daily for 14 days.    Facial rash       ketoconazole 2 % cream    NIZORAL    30 g    Apply topically 2 times daily    Tinea cruris       modafinil 100 MG tablet    PROVIGIL     Take 100 mg by mouth daily        omeprazole 20 MG CR capsule    priLOSEC    90 capsule    Take 1 capsule (20 mg) by mouth daily    Gastroesophageal reflux disease without esophagitis       predniSONE 20 MG tablet    DELTASONE    15 tablet    Take 2 po together once daily for 5 days, then 1 po daily for 5 more days    Acute idiopathic gout of left foot       propranolol 40 MG tablet    INDERAL    90 tablet    Take one-half tab in the AM and one-half tab in the PM.  Needs BP recheck for further refills    Essential hypertension

## 2018-05-18 NOTE — LETTER
5/18/2018       RE: Dev Martinez  [address]  [facility] [address]     Dear Colleague,    Thank you for referring your patient, Dev Martinez, to the [facility] at [facility]. Please see a copy of my visit note below.    Dev returns for great toe pain and swelling. There is a concern for gout, so we will attempt aspiration and injection today with kenalog. He had about 3 months of relief from his previous injection.    Diagnosis: left great toe arthritis and swelling    PROCEDURE:       Left MTP toe injection   NDC 5850-2965-66 kenalog     The patient was apprised of the risks and the benefits of the procedure and consented and a written consent was signed.   The patient s  toe was sterilely prepped with chloraprep.   40 mg of triamcinolone suspension was drawn up into a 5 mL syringe with 1 mL of 1% lidocaine  The patient was aspirated of about 1.5 cc of straw colored joint fluid(which was sent for cell count and crystals) and then injected with a 1.5-inch 18-gauge needle at the left toe MTP joint  There were no complications. The patient tolerated the procedure well. There was minimal bleeding.   The patient was instructed to ice the toe upon leaving clinic and refrain from overuse over the next 3 days.   The patient was instructed to go to the emergency room with any usual pain, swelling, or redness occurred in the injected area.   The patient was given a followup appointment to evaluate response to the injection to their increased range of motion and reduction of pain.  Follow up PRN after labs completed  Dr Norm Cornejo

## 2018-05-18 NOTE — PROGRESS NOTES
Dev returns for great toe pain and swelling. There is a concern for gout, so we will attempt aspiration and injection today with kenalog. He had about 3 months of relief from his previous injection.    Diagnosis: left great toe arthritis and swelling    PROCEDURE:       Left MTP toe injection   NDC 8245-8041-06 kenalog     The patient was apprised of the risks and the benefits of the procedure and consented and a written consent was signed.   The patient s  toe was sterilely prepped with chloraprep.   40 mg of triamcinolone suspension was drawn up into a 5 mL syringe with 1 mL of 1% lidocaine  The patient was aspirated of about 1.5 cc of straw colored joint fluid(which was sent for cell count and crystals) and then injected with a 1.5-inch 18-gauge needle at the left toe MTP joint  There were no complications. The patient tolerated the procedure well. There was minimal bleeding.   The patient was instructed to ice the toe upon leaving clinic and refrain from overuse over the next 3 days.   The patient was instructed to go to the emergency room with any usual pain, swelling, or redness occurred in the injected area.   The patient was given a followup appointment to evaluate response to the injection to their increased range of motion and reduction of pain.  Follow up PRN after labs completed  Dr Norm Cornejo

## 2018-05-18 NOTE — NURSING NOTE
[facility]  [address] [place] [address]  Dept: [phone]  ______________________________________________________________________________    Patient: Dev Martinez   : 1950   MRN: [id number]   May 18, 2018    INVASIVE PROCEDURE SAFETY CHECKLIST    Date: 2018   Procedure: Right great toe aspiration and cortisone injection  Patient Name: Dev Martinez  MRN: [id number]  YOB: 1950    Action: Complete sections as appropriate. Any discrepancy results in a HARD COPY until resolved.     PRE PROCEDURE:  Patient ID verified with 2 identifiers (name and  or MRN): Yes  Procedure and site verified with patient/designee (when able): Yes  Accurate consent documentation in medical record: Yes  H&P (or appropriate assessment) documented in medical record: Yes  H&P must be up to 20 days prior to procedure and updates within 24 hours of procedure as applicable: Yes  Relevant diagnostic and radiology test results appropriately labeled and displayed as applicable: Yes  Procedure site(s) marked with provider initials: Yes    TIMEOUT:  Time-Out performed immediately prior to starting procedure, including verbal and active participation of all team members addressing the following:Yes  * Correct patient identify  * Confirmed that the correct side and site are marked  * An accurate procedure consent form  * Agreement on the procedure to be done  * Correct patient position  * Relevant images and results are properly labeled and appropriately displayed  * The need to administer antibiotics or fluids for irrigation purposes during the procedure as applicable   * Safety precautions based on patient history or medication use    DURING PROCEDURE: Verification of correct person, site, and procedures any time the responsibility for care of the patient is transferred to another member of the care team.   The following medication was given:     MEDICATION:  Kenalog 40  mg  ROUTE: IA  SITE: Right great toe  DOSE: 1 cc  LOT #: [id number]  : [place]  EXPIRATION DATE: 01/2020  NDC#: [address]-2   Was there drug waste? No    MEDICATION:  Lidocaine without epinephrine  ROUTE: IA  SITE: Right great toe  DOSE: 4 cc  LOT #: [id number]  : [place]  EXPIRATION DATE: 02/2022  NDC#: 13130-329-06   Was there drug waste? Yes  Amount of drug waste (mL): 1.  Reason for waste:  Single use vial  Ginger Richardson, ATC  May 18, 2018

## 2018-06-29 DIAGNOSIS — I10 ESSENTIAL HYPERTENSION: ICD-10-CM

## 2018-06-29 RX ORDER — HYDROCHLOROTHIAZIDE 25 MG/1
25 TABLET ORAL DAILY
Qty: 90 TABLET | Refills: 4 | Status: SHIPPED | OUTPATIENT
Start: 2018-06-29 | End: 2019-09-18

## 2018-06-29 NOTE — TELEPHONE ENCOUNTER
See Negar Pisano changing to 25 mg one daily  , ordering 90 with 4 refills  Pt notified, to only take one of these now    Lesli Estrada RN  June 29, 2018 11:18 AM

## 2018-07-02 DIAGNOSIS — I10 ESSENTIAL HYPERTENSION: ICD-10-CM

## 2018-07-02 RX ORDER — PROPRANOLOL HYDROCHLORIDE 40 MG/1
TABLET ORAL
Qty: 90 TABLET | Refills: 0 | Status: SHIPPED | OUTPATIENT
Start: 2018-07-02 | End: 2018-10-16

## 2018-07-02 NOTE — TELEPHONE ENCOUNTER
Refill request received from pharmacy    Propranolol 40 mg      Last Written Prescription Date:  4/2/18  Last Fill Quantity: 90,   # refills: 0  Last Office Visit: 5/17/18  Future Office visit:       Routing refill request to refill pool.    Maggie Chacon RN   .[facility], Primary Care

## 2018-07-02 NOTE — TELEPHONE ENCOUNTER
Routing refill request to provider for review/approval because:  BP above goal at 5/17/18 office visit.    Maggie Chacon RN   [facility], [facility] Care

## 2018-08-03 DIAGNOSIS — M10.072 ACUTE IDIOPATHIC GOUT OF LEFT FOOT: ICD-10-CM

## 2018-08-03 RX ORDER — PREDNISONE 20 MG/1
TABLET ORAL
Qty: 15 TABLET | Refills: 1 | Status: SHIPPED | OUTPATIENT
Start: 2018-08-03 | End: 2019-04-30

## 2018-10-16 DIAGNOSIS — I10 ESSENTIAL HYPERTENSION: ICD-10-CM

## 2018-10-16 RX ORDER — PROPRANOLOL HYDROCHLORIDE 40 MG/1
TABLET ORAL
Qty: 90 TABLET | Refills: 1 | Status: SHIPPED | OUTPATIENT
Start: 2018-10-16 | End: 2019-04-11

## 2018-10-16 NOTE — TELEPHONE ENCOUNTER
Last office visit: 5/17/18, no future appointments.     Prescription approved per [facility] Refill Protocol.  Ewelina Crandall RN  Care Coordinator  [facility]

## 2018-11-29 ENCOUNTER — APPOINTMENT (OUTPATIENT)
Dept: FAMILY MEDICINE | Facility: CLINIC | Age: 68
End: 2018-11-29
Payer: COMMERCIAL

## 2018-11-29 ENCOUNTER — ALLIED HEALTH/NURSE VISIT (OUTPATIENT)
Dept: FAMILY MEDICINE | Facility: CLINIC | Age: 68
End: 2018-11-29
Payer: COMMERCIAL

## 2018-11-29 DIAGNOSIS — Z23 NEEDS FLU SHOT: Primary | ICD-10-CM

## 2018-11-29 NOTE — MR AVS SNAPSHOT
After Visit Summary   11/29/2018    Dev Martinez    MRN: [id number]           Patient Information     Date Of Birth          1950        Visit Information        Provider Department      11/29/2018 3:15 PM Nurse, [facility]        Today's Diagnoses     Needs flu shot    -  1       Follow-ups after your visit        Your next 10 appointments already scheduled     Nov 29, 2018  3:30 PM CST   Nurse Visit with [facility]    [address]  [facility] [address]   [phone]              Who to contact     Please call your clinic at [phone] to:    Ask questions about your health    Make or cancel appointments    Discuss your medicines    Learn about your test results    Speak to your doctor            Additional Information About Your Visit        MyChart Information     WHATT gives you secure access to your electronic health record. If you see a primary care provider, you can also send messages to your care team and make appointments. If you have questions, please call your primary care clinic.  If you do not have a primary care provider, please call [phone] and they will assist you.      WHATT is an electronic gateway that provides easy, online access to your medical records. With WHATT, you can request a clinic appointment, read your test results, renew a prescription or communicate with your care team.     To access your existing account, please contact your [facility] or call [phone] for assistance.        Care EveryWhere ID     This is your Care EveryWhere ID. This could be used by other organizations to access your [place] medical records  [id number]         Blood Pressure from Last 3 Encounters:   05/17/18 146/83   04/11/18 149/84   04/10/18 148/82    Weight from Last 3 Encounters:   05/18/18 263 lb (119.3 kg)   05/17/18 263 lb 8 oz (119.5  kg)   04/11/18 261 lb 12 oz (118.7 kg)              We Performed the Following     ADMIN INFLUENZA VIRUS VACCINE     C RIV4 (FLUBLOK) VACCINE RECOMBINANT DNA PRSRV ANTIBIO FREE, IM        Primary Care Provider Office Phone # Fax #    Behzad Pisano [phone] [address] [place] [address]        Equal Access to Services     [facility]: Hadii aad ku hadasho Soomaali, waaxda luqadaha, qaybta kaalmada adeegyada, waxay idiin hayaan adeeg khkrishnash lacarlee . So [facility] [phone].    ATENCIÓN: Si habla español, tiene a prasad disposición servicios gratuitos de asistencia lingüística. [facility] [phone].    We comply with applicable federal civil rights laws and [place] laws. We do not discriminate on the basis of race, color, national origin, age, disability, sex, sexual orientation, or gender identity.            Thank you!     Thank you for choosing [facility]  for your care. Our goal is always to provide you with excellent care. Hearing back from our patients is one way we can continue to improve our services. Please take a few minutes to complete the written survey that you may receive in the mail after your visit with us. Thank you!             Your Updated Medication List - Protect others around you: Learn how to safely use, store and throw away your medicines at www.disposemymeds.org.          This list is accurate as of 11/29/18  3:26 PM.  Always use your most recent med list.                   Brand Name Dispense Instructions for use Diagnosis    guaiFENesin-codeine 100-10 MG/5ML solution    ROBITUSSIN AC    180 mL    Take 10 mLs by mouth every 4 hours as needed for cough    Acute bronchitis with symptoms > 10 days       hydrochlorothiazide 25 MG tablet    HYDRODIURIL    90 tablet    Take 1 tablet (25 mg) by mouth daily    Essential hypertension       hydrocortisone 0.2 % external cream    WESTCORT    30 g    Apply sparingly to affected area three times daily for 14  days.    Facial rash       ketoconazole 2 % external cream    NIZORAL    30 g    Apply topically 2 times daily    Tinea cruris       modafinil 100 MG tablet    PROVIGIL     Take 100 mg by mouth daily        omeprazole 20 MG DR capsule    priLOSEC    90 capsule    Take 1 capsule (20 mg) by mouth daily    Gastroesophageal reflux disease without esophagitis       predniSONE 20 MG tablet    DELTASONE    15 tablet    Take 2 po together once daily for 5 days, then 1 po daily for 5 more days    Acute idiopathic gout of left foot       propranolol 40 MG tablet    INDERAL    90 tablet    Take one-half tab in the AM and one-half tab in the PM.    Essential hypertension

## 2018-11-29 NOTE — NURSING NOTE
"Injectable Influenza Immunization Documentation    1.  Has the patient received the information for the injectable influenza vaccine? YES     2. Is the patient 6 months of age or older? YES     3. Does the patient have any of the following contraindications?         Severe allergy to eggs? No     Severe allergic reaction to previous influenza vaccines? No   Severe allergy to latex? No       History of Guillain-[place] syndrome? No     Currently have a temperature greater than 100.4F? No        4.  Severely egg allergic patients should have flu vaccine eligibility assessed by an MD, RN, or pharmacist, and those who received flu vaccine should be observed for 15 min by an MD, RN, Pharmacist, Medical Technician, or member of clinic staff.\": YES    5. Latex-allergic patients should be given latex-free influenza vaccine Yes. Please reference the Vaccine latex table to determine if your clinic s product is latex-containing.       Vaccination given by Estee Ferguson CMA  November 29, 2018 3:25 PM        Dev Martinez comes into clinic today at the request of Dr. Pisano Ordering Provider for Flu shot.        This service provided today was under the supervising provider of the day Dr. Cosme, who was available if needed.    Estee Ferguson          "

## 2019-01-17 ENCOUNTER — TRANSFERRED RECORDS (OUTPATIENT)
Dept: HEALTH INFORMATION MANAGEMENT | Facility: CLINIC | Age: 69
End: 2019-01-17

## 2019-04-04 ENCOUNTER — TELEPHONE (OUTPATIENT)
Dept: FAMILY MEDICINE | Facility: CLINIC | Age: 69
End: 2019-04-04

## 2019-04-04 NOTE — TELEPHONE ENCOUNTER
Form or Letter Request    Type or form/letter needing completion: Medical necessity form  Provider: Aliya  Has provider seen patient for office visit related to reason for form request? Yes  Date form needed: Non-urgent  Once completed: Patient will  at .     [place]

## 2019-04-05 NOTE — TELEPHONE ENCOUNTER
Called patient and LVM to return call to clinic and speak to one of the nurses. This is about the forms he is needing to be completed.   Lorena Chong RN  04/05/19  10:02 AM

## 2019-04-10 DIAGNOSIS — I10 ESSENTIAL HYPERTENSION: ICD-10-CM

## 2019-04-10 NOTE — TELEPHONE ENCOUNTER
Last time prescribed: 10/16/18 , 90 tabs/caps x 1 refills  Last office visit: 5/17/18  Next appointment: 5/30/19     Medication is being filled for 1 time refill only due to:  Patient needs to be seen because needs BP check in, and soon due for annual visit. .     Keep future appt.     Lesli Estrada RN  April 11, 2019 5:54 PM

## 2019-04-11 RX ORDER — PROPRANOLOL HYDROCHLORIDE 40 MG/1
TABLET ORAL
Qty: 90 TABLET | Refills: 0 | Status: SHIPPED | OUTPATIENT
Start: 2019-04-11 | End: 2019-07-09

## 2019-04-30 DIAGNOSIS — K21.9 GASTROESOPHAGEAL REFLUX DISEASE WITHOUT ESOPHAGITIS: ICD-10-CM

## 2019-05-01 DIAGNOSIS — B35.6 TINEA CRURIS: ICD-10-CM

## 2019-05-01 RX ORDER — KETOCONAZOLE 20 MG/G
CREAM TOPICAL 2 TIMES DAILY
Qty: 30 G | Refills: 1 | Status: SHIPPED | OUTPATIENT
Start: 2019-05-01 | End: 2020-08-05

## 2019-05-01 NOTE — TELEPHONE ENCOUNTER
Last time prescribed: 1/25/18, 30 g x 1 refills  Last office visit: 5/17/18  Next appointment: 5/30/19     Prescription approved per [facility] Refill Protocol.  Ewelina Crandall RN  [facility]

## 2019-05-30 ENCOUNTER — OFFICE VISIT (OUTPATIENT)
Dept: FAMILY MEDICINE | Facility: CLINIC | Age: 69
End: 2019-05-30
Payer: COMMERCIAL

## 2019-05-30 VITALS
TEMPERATURE: 97.8 F | WEIGHT: 267.75 LBS | SYSTOLIC BLOOD PRESSURE: 147 MMHG | OXYGEN SATURATION: 96 % | BODY MASS INDEX: 40.58 KG/M2 | DIASTOLIC BLOOD PRESSURE: 90 MMHG | HEART RATE: 54 BPM | HEIGHT: 68 IN

## 2019-05-30 DIAGNOSIS — I10 ESSENTIAL HYPERTENSION: ICD-10-CM

## 2019-05-30 DIAGNOSIS — Z12.5 SCREENING FOR PROSTATE CANCER: ICD-10-CM

## 2019-05-30 DIAGNOSIS — R73.09 ELEVATED GLUCOSE: ICD-10-CM

## 2019-05-30 DIAGNOSIS — Z13.1 SCREENING FOR DIABETES MELLITUS: ICD-10-CM

## 2019-05-30 DIAGNOSIS — Z13.220 SCREENING CHOLESTEROL LEVEL: ICD-10-CM

## 2019-05-30 DIAGNOSIS — Z23 NEED FOR PNEUMOCOCCAL VACCINATION: ICD-10-CM

## 2019-05-30 DIAGNOSIS — Z00.00 MEDICARE ANNUAL WELLNESS VISIT, SUBSEQUENT: Primary | ICD-10-CM

## 2019-05-30 LAB
BUN SERPL-MCNC: 10 MG/DL (ref 7–30)
CALCIUM SERPL-MCNC: 9.4 MG/DL (ref 8.5–10.4)
CHLORIDE SERPLBLD-SCNC: 97 MMOL/L (ref 94–109)
CO2 SERPL-SCNC: 32 MMOL/L (ref 20–32)
CREAT SERPL-MCNC: 1 MG/DL (ref 0.8–1.5)
EGFR CALCULATED (BLACK REFERENCE): 95.9
EGFR CALCULATED (NON BLACK REFERENCE): 79.2
GLUCOSE SERPL-MCNC: 153 MG/DL (ref 60–109)
POTASSIUM SERPL-SCNC: 3.6 MMOL/L (ref 3.4–5.3)
PSA SERPL-ACNC: 2.13 UG/L (ref 0–4)
SODIUM SERPL-SCNC: 141 MMOL/L (ref 136–145)

## 2019-05-30 ASSESSMENT — MIFFLIN-ST. JEOR: SCORE: 1963.88

## 2019-05-30 NOTE — NURSING NOTE
"69 year old  Chief Complaint   Patient presents with     Physical     wants to talk about his weight and tiredness as well       Blood pressure 153/89, pulse 55, temperature 97.8  F (36.6  C), temperature source Oral, height 1.727 m (5' 7.99\"), weight 121.5 kg (267 lb 12 oz), SpO2 96 %. Body mass index is 40.72 kg/m .  Patient Active Problem List   Diagnosis     Hypertension     Obstructive sleep apnea     Prediabetes     Left bundle branch block       Wt Readings from Last 2 Encounters:   05/30/19 121.5 kg (267 lb 12 oz)   05/18/18 119.3 kg (263 lb)     BP Readings from Last 3 Encounters:   05/30/19 153/89   05/17/18 146/83   04/11/18 149/84         Current Outpatient Medications   Medication     hydrochlorothiazide (HYDRODIURIL) 25 MG tablet     hydrocortisone (WESTCORT) 0.2 % cream     ketoconazole (NIZORAL) 2 % external cream     modafinil (PROVIGIL) 100 MG tablet     omeprazole (PRILOSEC) 20 MG DR capsule     propranolol (INDERAL) 40 MG tablet     No current facility-administered medications for this visit.        Social History     Tobacco Use     Smoking status: Current Some Day Smoker     Years: 8.00     Types: Cigars     Start date: 6/15/2008     Smokeless tobacco: Never Used   Substance Use Topics     Alcohol use: Yes     Comment: 7     Drug use: No       Health Maintenance Due   Topic Date Due     ADVANCED DIRECTIVE PLANNING  1950     ZOSTER IMMUNIZATION (2 of 3) 03/14/2016     FALL RISK ASSESSMENT  07/13/2018     PHQ-2  01/01/2019     MEDICARE ANNUAL WELLNESS VISIT  04/11/2019     PNEUMOCOCCAL IMMUNIZATION 65+ LOW/MEDIUM RISK (2 of 2 - PPSV23) 04/11/2019       No results found for: PAP      May 30, 2019 8:47 AM    "

## 2019-05-30 NOTE — PROGRESS NOTES
"CHIEF COMPLAINT: Medicare annual wellness visit, subsequent      HISTORY OF PRESENT ILLNESS: Dev Martinez is a 69 year old male who presents for an annual physical. He is concerned about his weight gain and the fatigue resulting from it. He has worked with a nutritionist in the past and is interested in meeting with one again. He used to swim on a regular basis, but is not able to do it as often. He has struggled getting back to it after winter.    Dev has a hx of hypertension. He is taking propranolol 20 mg BID. He is also taking hydrochlorothiazide 25 mg daily.     He has a hx of AVA. Occasionally he struggles staying awake at work. Half way into his one hour drive to work, he has the sense that he wants to go to sleep. He doesn't eat until he gets to work around 7:30 am. He has hx of esophagitis, triggered by coffee. He is doing well with omeprazole 20 mg daily.       FAMILY, SOCIAL AND PAST SURGICAL HISTORIES:  Unchanged.       MEDICATIONS:  Carefully reviewed.       HEALTHCARE MAINTENANCE:    Health Maintenance   Topic Date Due     ADVANCED DIRECTIVE PLANNING  1950     ZOSTER IMMUNIZATION (2 of 3) 03/14/2016     FALL RISK ASSESSMENT  07/13/2018     PHQ-2  01/01/2019     MEDICARE ANNUAL WELLNESS VISIT  04/11/2019     PNEUMOCOCCAL IMMUNIZATION 65+ LOW/MEDIUM RISK (2 of 2 - PPSV23) 04/11/2019     LIPID  04/11/2023     DTAP/TDAP/TD IMMUNIZATION (2 - Td) 01/18/2026     COLONOSCOPY  06/22/2026     HEPATITIS C SCREENING  Completed     INFLUENZA VACCINE  Completed     AORTIC ANEURYSM SCREENING (SYSTEM ASSIGNED)  Completed     IPV IMMUNIZATION  Aged Out     MENINGITIS IMMUNIZATION  Aged Out       Eye exam: Eye care up to date, he follow with Dr. Wesley. He was recently seen for floaters and flashes of light, \"mist\". He will follow up in one year.   Hearing: He plans to see an audiologist. He has tinnitis and has a hard time hearing in loud areas.   Dental: Up to date  Immunizations: He is due for PCV 23 " "and we discussed going to pharmacy for shingrex vaccine.  Colonoscopy: Up to date    Medicare Preventive Visit:   1. No problems with ADLs   2. No falls, slipped on ice once  3. No depression  4. Not concerned about memory loss, he struggles remembering names.       REVIEW OF SYSTEMS:  A 10-point review is negative.       OBJECTIVE:    GENERAL: Dev Martinez is in no distress.  Affect is upbeat.   Alert and oriented x3  VITAL SIGNS: /89 (BP Location: Right arm, Patient Position: Sitting, Cuff Size: Adult Regular)   Pulse 55   Temp 97.8  F (36.6  C) (Oral)   Ht 1.727 m (5' 7.99\")   Wt 121.5 kg (267 lb 12 oz)   SpO2 96%   BMI 40.72 kg/m    HEENT:  Head is normocephalic, atraumatic. Ears clear bilaterally. No pain with palpation of the frontal or maxillary sinuses.  Eyes are grossly normal.  Nose is free of any congestion or discharge.  Teeth in good repair.  Mucous membranes are moist.  Throat looks benign.  Neck is supple without adenopathy or thyromegaly.  No carotid bruits are heard, no JVD.   BACK:  Smooth and straight.  No pain to percussion.  No CVA tenderness.   LUNGS:  Clear to auscultation bilaterally.   HEART:  Regular rate and rhythm without murmur.   ABDOMEN:  Benign.     EXTREMITIES:  Ankles free of any edema.   SKIN:  Warm and dry.       LABORATORY DATA:   BMP, PSA      ASSESSMENT AND PLAN:   1. Medicare annual wellness visit, subsequent; UTD with HCM.  2. Weight gain/Fatigue. Referred to meet with our nutritionist  3. PSA,  BMP, pending. He will be notified the results.   4. F/U in ~ 1 year.    Call with any problems or questions.       IDaja, am serving as a scribe to document services personally performed by Dr. Behzad Pisano, based on data collection and the provider's statements to me. Dr. Pisano has reviewed, edited, and approved the above note.     --Behzad Pisano MD    "

## 2019-05-30 NOTE — NURSING NOTE
Screening Questionnaire for Adult Immunization    Are you sick today?   No   Do you have allergies to medications, food, a vaccine component or latex?   No   Have you ever had a serious reaction after receiving a vaccination?   No   Do you have a long-term health problem with heart disease, lung disease, asthma, kidney disease, metabolic disease (e.g. diabetes), anemia, or other blood disorder?   No   Do you have cancer, leukemia, HIV/AIDS, or any other immune system problem?   No   In the past 3 months, have you taken medications that affect  your immune system, such as prednisone, other steroids, or anticancer drugs; drugs for the treatment of rheumatoid arthritis, Crohn s disease, or psoriasis; or have you had radiation treatments?   No   Have you had a seizure, or a brain or other nervous system problem?   No   During the past year, have you received a transfusion of blood or blood     products, or been given immune (gamma) globulin or antiviral drug?   No   For women: Are you pregnant or is there a chance you could become        pregnant during the next month?   No   Have you received any vaccinations in the past 4 weeks?   No     Immunization questionnaire answers were all negative.        Per orders of Dr. Behzad Pisano, injection of PPSV 23 given by Mikey Rosales. Patient instructed to remain in clinic for 15 minutes afterwards, and to report any adverse reaction to me immediately.       Screening performed by Mikey Rosales on 5/30/2019 at 9:38 AM.

## 2019-05-31 LAB — HBA1C MFR BLD: 6.5 % (ref 4.1–5.7)

## 2019-07-08 DIAGNOSIS — I10 ESSENTIAL HYPERTENSION: ICD-10-CM

## 2019-07-08 NOTE — TELEPHONE ENCOUNTER
Last office visit was on 05/30/2019, no future visits scheduled.    BP Readings from Last 3 Encounters:   05/30/19 147/90   05/17/18 146/83   04/11/18 149/84     Prescription approved per [facility] Refill Protocol.    Lorena Chong RN  07/09/19  10:55 AM

## 2019-07-09 RX ORDER — PROPRANOLOL HYDROCHLORIDE 40 MG/1
TABLET ORAL
Qty: 90 TABLET | Refills: 3 | Status: SHIPPED | OUTPATIENT
Start: 2019-07-09 | End: 2020-07-07

## 2019-07-31 ENCOUNTER — OFFICE VISIT (OUTPATIENT)
Dept: FAMILY MEDICINE | Facility: CLINIC | Age: 69
End: 2019-07-31
Payer: COMMERCIAL

## 2019-07-31 VITALS
OXYGEN SATURATION: 94 % | SYSTOLIC BLOOD PRESSURE: 128 MMHG | DIASTOLIC BLOOD PRESSURE: 78 MMHG | BODY MASS INDEX: 39.39 KG/M2 | WEIGHT: 259 LBS | HEART RATE: 58 BPM | TEMPERATURE: 98 F

## 2019-07-31 DIAGNOSIS — R42 LIGHTHEADEDNESS: Primary | ICD-10-CM

## 2019-07-31 DIAGNOSIS — E87.6 HYPOKALEMIA: ICD-10-CM

## 2019-07-31 DIAGNOSIS — I10 ESSENTIAL HYPERTENSION: ICD-10-CM

## 2019-07-31 DIAGNOSIS — E11.9 TYPE 2 DIABETES MELLITUS WITHOUT COMPLICATION, WITHOUT LONG-TERM CURRENT USE OF INSULIN (H): ICD-10-CM

## 2019-07-31 LAB
BUN SERPL-MCNC: 13 MG/DL (ref 7–30)
CALCIUM SERPL-MCNC: 9.3 MG/DL (ref 8.5–10.4)
CHLORIDE SERPLBLD-SCNC: 99 MMOL/L (ref 94–109)
CO2 SERPL-SCNC: 29 MMOL/L (ref 20–32)
CREAT SERPL-MCNC: 1.1 MG/DL (ref 0.8–1.5)
EGFR CALCULATED (BLACK REFERENCE): 85.9
EGFR CALCULATED (NON BLACK REFERENCE): 71
GLUCOSE SERPL-MCNC: 164 MG/DL (ref 60–99)
HBA1C MFR BLD: 6.1 % (ref 4.1–5.7)
POTASSIUM SERPL-SCNC: 3.3 MMOL/L (ref 3.4–5.3)
SODIUM SERPL-SCNC: 138 MMOL/L (ref 137.3–146.3)

## 2019-07-31 NOTE — PATIENT INSTRUCTIONS
http://www.diabetes.org/food-and-fitness/food/what-can-i-eat/understanding-carbohydrates/carbohydrate-counting/carbohydrate-counting.html?loc=ff-slabnav    American Diabetes association    Maral is nutritionist at [facility].    Diabetes education

## 2019-07-31 NOTE — PROGRESS NOTES
Dev Martinez is a 69 year old male here for the following issues:    Lightheadedness  Dev reports experiencing a slight twinge when he moves his head, and he believes he has noticed this over the past few years.  He notes this is intermittent and very mild.  He notes when looking up or down, he will notice this, and notes he has felt increasingly clumsy.  He has had vertigo in the past, and notes this feels different. No headaches.  No congestion, sinus inflammation or hearing concerns.      Prediabetes  Dev has prediabetes and is more concerned that above lightheadedness is related to progressing symptoms. At his physical exam (5/2019) he had elevated glucose of 153, and an a1C of 6.5.  He reports increased thirst. No vision changes. He gets up at night 2-3 times per night to use the bathroom, which he believes has been normal.  He notes earlier this week, he felt he was urinating more than usual.  He tries to walk 15 minutes while at work, along with a longer walk 1-2 days per week.  He has a family hx of diabetes.  He follows up with eye checks yearly.  No loss of balance. No nausea, racing heart, palpitations or chest pain.  No numbness or tingling of extremities.  No dysuria.    He recently started Weight Watchers in mid June and has lost 8 pounds.    Wt Readings from Last 2 Encounters:   07/31/19 117.5 kg (259 lb)   05/30/19 121.5 kg (267 lb 12 oz)     Hypertension  He has had a hx of elevated blood pressures, and is currently taking no medication.  He notes that he checks his blood pressures at home regularly, and notes his blood pressure readings are typically close to 130/80.  He has never brought in his clinic to be checked.  No shortness of breath or heart palpitations.    BP Readings from Last 3 Encounters:   07/31/19 128/78   05/30/19 147/90   05/17/18 146/83         Patient Active Problem List   Diagnosis     Hypertension     Obstructive sleep apnea     Prediabetes     Left bundle branch block        Current Outpatient Medications   Medication Sig Dispense Refill     hydrochlorothiazide (HYDRODIURIL) 25 MG tablet Take 1 tablet (25 mg) by mouth daily 90 tablet 4     hydrocortisone (WESTCORT) 0.2 % cream Apply sparingly to affected area three times daily for 14 days. (Patient taking differently: as needed Apply sparingly to affected area three times daily for 14 days.) 30 g 1     ketoconazole (NIZORAL) 2 % external cream Apply topically 2 times daily (Patient taking differently: Apply topically as needed ) 30 g 1     modafinil (PROVIGIL) 100 MG tablet Take 100 mg by mouth daily       omeprazole (PRILOSEC) 20 MG DR capsule Take 1 capsule (20 mg) by mouth daily 90 capsule 3     propranolol (INDERAL) 40 MG tablet Take one-half tab in the AM and one-half tab in the PM. 90 tablet 3       No Known Allergies     EXAM  BP (!) 145/85   Pulse 58   Temp 98  F (36.7  C) (Oral)   Wt 117.5 kg (259 lb)   SpO2 94%   BMI 39.39 kg/m    Gen: Alert, pleasant, NAD,obese  Eyes No nystagmus  COR: S1,S2, no murmur  Lungs: CTA bilaterally, no rhonchi, wheezes or rales  Ext: Trace edema of lower extremities  Neuro: Tandem Gait normal, balance normal      Assessment:  (R42) Lightheadedness  (primary encounter diagnosis)  Comment: Suspect this may be due to sinus congestion, mild vertigo, doubt underlying cardiac condition. No headaches, no red flags  Plan: Recommend good hydration. Monitor for progression of symptoms.  History of diabetes, see below for plan.    (I10) Essential hypertension  Comment: Second BP reading in normal range  Plan: Basic Metabolic Panel ([place])        Check for electrolyte abnormality, recommend he bring in home BP machine to compare readings.    (E11.9) Type 2 diabetes mellitus without complication, without long-term current use of insulin (H)  Comment: a1C is improving, with concerted 8 pound weight loss  Lab Results   Component Value Date    A1C 6.1 07/31/2019    A1C 6.5 05/31/2019    A1C 5.9  03/02/2017    A1C 5.5 12/04/2014     Plan: Hemoglobin A1c ([place]), DIABETES EDUCATOR         REFERRAL        A1c is checked today. Refer to diabetic education, continue efforts at weight loss.  Had discussed use of metformin if a1c was rising, but that is not needed today.        Kalie Cosme MD  Internal Medicine/Pediatrics      I, Tian Robles, am serving as a scribe to document services personally performed by Dr. Kalie Cosme, based on data collection and the provider's statements to me. Dr. Cosme has reviewed, edited, and approved the above note.

## 2019-07-31 NOTE — NURSING NOTE
69 year old  Chief Complaint   Patient presents with     Dizziness     pt has been having some lightheadedness. He expireneced an episode today and reports last time he was in his blo sugar was almost into diabetic range.       Blood pressure (!) 145/85, pulse 58, temperature 98  F (36.7  C), temperature source Oral, weight 117.5 kg (259 lb), SpO2 94 %. Body mass index is 39.39 kg/m .  Patient Active Problem List   Diagnosis     Hypertension     Obstructive sleep apnea     Prediabetes     Left bundle branch block       Wt Readings from Last 2 Encounters:   07/31/19 117.5 kg (259 lb)   05/30/19 121.5 kg (267 lb 12 oz)     BP Readings from Last 3 Encounters:   07/31/19 (!) 145/85   05/30/19 147/90   05/17/18 146/83         Current Outpatient Medications   Medication     hydrochlorothiazide (HYDRODIURIL) 25 MG tablet     hydrocortisone (WESTCORT) 0.2 % cream     ketoconazole (NIZORAL) 2 % external cream     modafinil (PROVIGIL) 100 MG tablet     omeprazole (PRILOSEC) 20 MG DR capsule     propranolol (INDERAL) 40 MG tablet     No current facility-administered medications for this visit.        Social History     Tobacco Use     Smoking status: Current Some Day Smoker     Years: 8.00     Types: Cigars     Start date: 6/15/2008     Smokeless tobacco: Never Used   Substance Use Topics     Alcohol use: Yes     Comment: 7     Drug use: No       Health Maintenance Due   Topic Date Due     ADVANCE CARE PLANNING  1950     ZOSTER IMMUNIZATION (2 of 3) 03/14/2016       No results found for: PAP      July 31, 2019 2:02 PM

## 2019-09-18 DIAGNOSIS — I10 ESSENTIAL HYPERTENSION: ICD-10-CM

## 2019-09-18 RX ORDER — HYDROCHLOROTHIAZIDE 25 MG/1
25 TABLET ORAL DAILY
Qty: 90 TABLET | Refills: 0 | Status: SHIPPED | OUTPATIENT
Start: 2019-09-18 | End: 2019-12-20

## 2019-09-18 NOTE — TELEPHONE ENCOUNTER
Last time prescribed: 6/29/18 , 90 tabs x 4 refills  Last office visit: 7/31/19  Next appointment: No future appointments    Medication is being filled for 1 time refill only due to:  Patient needs labs BMP - already ordered.   Ewelina Crandall RN  [facility]

## 2019-11-07 ENCOUNTER — HEALTH MAINTENANCE LETTER (OUTPATIENT)
Age: 69
End: 2019-11-07

## 2019-12-19 ENCOUNTER — OFFICE VISIT (OUTPATIENT)
Dept: FAMILY MEDICINE | Facility: CLINIC | Age: 69
End: 2019-12-19
Payer: COMMERCIAL

## 2019-12-19 VITALS
TEMPERATURE: 97.7 F | BODY MASS INDEX: 36.79 KG/M2 | OXYGEN SATURATION: 97 % | SYSTOLIC BLOOD PRESSURE: 138 MMHG | HEART RATE: 49 BPM | DIASTOLIC BLOOD PRESSURE: 74 MMHG | HEIGHT: 68 IN | WEIGHT: 242.75 LBS

## 2019-12-19 DIAGNOSIS — E87.6 HYPOKALEMIA: ICD-10-CM

## 2019-12-19 DIAGNOSIS — Z23 NEED FOR INFLUENZA VACCINATION: ICD-10-CM

## 2019-12-19 DIAGNOSIS — E11.9 TYPE 2 DIABETES MELLITUS WITHOUT COMPLICATION, WITHOUT LONG-TERM CURRENT USE OF INSULIN (H): Primary | ICD-10-CM

## 2019-12-19 LAB
ANION GAP SERPL CALCULATED.3IONS-SCNC: 4 MMOL/L (ref 3–14)
BUN SERPL-MCNC: 13 MG/DL (ref 7–30)
CALCIUM SERPL-MCNC: 8.9 MG/DL (ref 8.5–10.1)
CHLORIDE SERPL-SCNC: 102 MMOL/L (ref 94–109)
CHOLEST SERPL-MCNC: 164 MG/DL (ref 0–200)
CHOLEST/HDLC SERPL: 3.1 {RATIO} (ref 0–5)
CO2 SERPL-SCNC: 31 MMOL/L (ref 20–32)
CREAT SERPL-MCNC: 0.94 MG/DL (ref 0.66–1.25)
CREAT UR-MCNC: 136 MG/DL
FASTING SPECIMEN: YES
GFR SERPL CREATININE-BSD FRML MDRD: 83 ML/MIN/{1.73_M2}
GLUCOSE SERPL-MCNC: 116 MG/DL (ref 70–99)
HBA1C MFR BLD: 5.9 % (ref 4.1–5.7)
HDLC SERPL-MCNC: 54 MG/DL
LDLC SERPL CALC-MCNC: 96 MG/DL (ref 0–129)
MICROALBUMIN UR-MCNC: 5 MG/L
MICROALBUMIN/CREAT UR: 3.8 MG/G CR (ref 0–17)
POTASSIUM SERPL-SCNC: 3.5 MMOL/L (ref 3.4–5.3)
SODIUM SERPL-SCNC: 137 MMOL/L (ref 133–144)
TRIGL SERPL-MCNC: 70 MG/DL (ref 0–150)
VLDL-CHOLESTEROL: 14 (ref 7–32)

## 2019-12-19 ASSESSMENT — MIFFLIN-ST. JEOR: SCORE: 1846.12

## 2019-12-19 NOTE — NURSING NOTE
"Injectable Influenza Immunization Documentation    1.  Has the patient received the information for the injectable influenza vaccine? YES     2. Is the patient 6 months of age or older? YES     3. Does the patient have any of the following contraindications?         Severe allergy to eggs? No     Severe allergic reaction to previous influenza vaccines? No   Severe allergy to latex? No       History of Guillain-Scenic syndrome? No     Currently have a temperature greater than 100.4F? No        4.  Severely egg allergic patients should have flu vaccine eligibility assessed by an MD, RN, or pharmacist, and those who received flu vaccine should be observed for 15 min by an MD, RN, Pharmacist, Medical Technician, or member of clinic staff.\": YES    5. Latex-allergic patients should be given latex-free influenza vaccine Yes. Please reference the Vaccine latex table to determine if your clinic s product is latex-containing.       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 He wants to know if it will also be billed through his insurance.     He also has some questions about the sleep study.      Please call and advise.

## 2019-12-19 NOTE — NURSING NOTE
"69 year old  Chief Complaint   Patient presents with     Diabetes     Labs,  with diabetes f/u ,  flu shot        Blood pressure 138/74, pulse (!) 49, temperature 97.7  F (36.5  C), temperature source Oral, height 1.72 m (5' 7.72\"), weight 110.1 kg (242 lb 12 oz), SpO2 97 %. Body mass index is 37.22 kg/m .  Patient Active Problem List   Diagnosis     Hypertension     Obstructive sleep apnea     Prediabetes     Left bundle branch block       Wt Readings from Last 2 Encounters:   12/19/19 110.1 kg (242 lb 12 oz)   07/31/19 117.5 kg (259 lb)     BP Readings from Last 3 Encounters:   12/19/19 138/74   07/31/19 128/78   05/30/19 147/90         Current Outpatient Medications   Medication     hydrochlorothiazide (HYDRODIURIL) 25 MG tablet     hydrocortisone (WESTCORT) 0.2 % cream     ketoconazole (NIZORAL) 2 % external cream     modafinil (PROVIGIL) 100 MG tablet     omeprazole (PRILOSEC) 20 MG DR capsule     propranolol (INDERAL) 40 MG tablet     No current facility-administered medications for this visit.        Social History     Tobacco Use     Smoking status: Current Some Day Smoker     Years: 8.00     Types: Cigars     Start date: 6/15/2008     Smokeless tobacco: Never Used   Substance Use Topics     Alcohol use: Yes     Comment: 7     Drug use: No       Health Maintenance Due   Topic Date Due     MICROALBUMIN  1950     DIABETIC FOOT EXAM  1950     ADVANCE CARE PLANNING  1950     EYE EXAM  1950     ZOSTER IMMUNIZATION (2 of 3) 03/14/2016     LIPID  04/11/2019     INFLUENZA VACCINE (1) 09/01/2019     A1C  10/31/2019       No results found for: PAP      December 19, 2019 9:54 AM  "

## 2019-12-19 NOTE — PROGRESS NOTES
"CHIEF COMPLAINT: Diabetic Followup      HISTORY OF PRESENT ILLNESS: Dev Martinez is a 69 year old male with diet-controlled type 2 diabetes who presents for follow-up. His last A1c was 6.1% on 07/31/19. Blood pressure is 138/74 today. He is currently on hydrochlorothiazide 25 mg daily for his blood pressure. Last LDL was 87 on 4/11/18. He is not on a statin medication. He does not take aspirin daily. He is a nonsmoker. He is due for an eye exam. He has lost 17 pounds since 7/31/19 from following Weight Watchers.     FAMILY, SOCIAL AND PAST SURGICAL HISTORIES:  Unchanged.       MEDICATIONS: Reviewd      REVIEW OF SYSTEMS: As above.       OBJECTIVE:    GENERAL: He is in no distress.  Affect is upbeat.     VITAL SIGNS: /74 (BP Location: Left arm, Patient Position: Sitting, Cuff Size: Adult Large)   Pulse (!) 49   Temp 97.7  F (36.5  C) (Oral)   Ht 1.72 m (5' 7.72\")   Wt 110.1 kg (242 lb 12 oz)   SpO2 97%   BMI 37.22 kg/m     Ext: Pulses palpable  NEURO: Monofilament testing was perfect throughout, tops and bottoms of all 10 toes.      LABORATORY DATA: Hemoglobin A1c, BMP, lipid panel, and microalbumin, pending      ASSESSMENT AND PLAN:   1. Type 2 diabetes. A1c is less than 8%.  Blood pressure is less than 140/90. Continue with current medications. Monofilament testing completely normal today. At optimal care.  2. Immunizations: Flu shot given today. Discussed recommendation for shingles vaccine when available at pharmacy  3. Call with any problems or questions.       I, Puneet Cortez, am serving as a scribe to document services personally performed by Dr. Behzad Pisano, based on data collection and the provider's statements to me. Dr. Pisano has reviewed, edited, and approved the above note.     --Behzad Pisano MD      "

## 2019-12-20 ENCOUNTER — MYC REFILL (OUTPATIENT)
Dept: FAMILY MEDICINE | Facility: CLINIC | Age: 69
End: 2019-12-20

## 2019-12-20 DIAGNOSIS — I10 ESSENTIAL HYPERTENSION: ICD-10-CM

## 2019-12-20 RX ORDER — HYDROCHLOROTHIAZIDE 25 MG/1
25 TABLET ORAL DAILY
Qty: 90 TABLET | Refills: 3 | Status: SHIPPED | OUTPATIENT
Start: 2019-12-20 | End: 2020-06-30

## 2019-12-20 NOTE — TELEPHONE ENCOUNTER
Last visit 12/19/19    Prescription approved per [facility] Refill Protocol.  Ewelina Crandall RN  [facility]

## 2020-02-20 ENCOUNTER — TRANSFERRED RECORDS (OUTPATIENT)
Dept: HEALTH INFORMATION MANAGEMENT | Facility: CLINIC | Age: 70
End: 2020-02-20

## 2020-02-20 LAB — RETINOPATHY: NORMAL

## 2020-03-03 ENCOUNTER — TELEPHONE (OUTPATIENT)
Dept: FAMILY MEDICINE | Facility: CLINIC | Age: 70
End: 2020-03-03

## 2020-03-03 NOTE — TELEPHONE ENCOUNTER
"[facility] Call Center    Phone Message    May a detailed message be left on voicemail: yes     Reason for Call: Symptoms or Concerns     If patient has red-flag symptoms, warm transfer to triage line    Current symptom or concern: Cough, fever    Symptoms have been present for:  1-2 week(s)    Has patient previously been seen for this? No    By: Dr. Pisano    Date: Last seen 12/19/2019    Are there any new or worsening symptoms? Yes: Pt is concerned about a possible coronavirus contraction. He states he was in [place] recently, but returned home through [facility] airhospitals, where lots of people were coughing. He has since developed a cough, and had a fever last night of 100.4. He has not taken his temperature today, but \"feels warm\". Please call to discuss.    Action Taken: Message routed to:  [facility]: [facility] nurse    Travel Screening: Positive: unable to schedule an appointment, due to positive travel screen.  Informed patient/caller that a message will be sent to the clinic; who will then call them back to discuss next steps.     Patient screened positive for: Coronavirus  "

## 2020-03-03 NOTE — TELEPHONE ENCOUNTER
"Spoke to pt - states he left [place] on Sat night and arrived in [place] Sun AM. States there were many people at the airport - noted many coughing.  He developed cough Sun during the night; had fever as high as 100.4 yesterday.  States today he feels \"better\" - denies fever, coughing less, generally less fatigue. Stayed home from work yesterday and today - will likely stay home another day tomorrow.  Advised he does not need treatment at this time - he should drink fluids , rest, keep at home at least one more day. Pt agrees with plan - he will call back if symptoms return or worsen, or with more questions.  Discussed pt symptoms with Dr Pisano.   Ewelina Crandall RN  [facility]  "

## 2020-04-21 DIAGNOSIS — K21.9 GASTROESOPHAGEAL REFLUX DISEASE WITHOUT ESOPHAGITIS: ICD-10-CM

## 2020-04-21 NOTE — TELEPHONE ENCOUNTER
Last time prescribed: 4/30/19 , 90 caps x 3 refills  Last office visit: 12/19/19  Next appointment: No future appointments    Prescription approved per [facility] Refill Protocol.  Lorena Chong RN  04/21/20  11:20 AM

## 2020-04-22 DIAGNOSIS — K21.9 GASTROESOPHAGEAL REFLUX DISEASE WITHOUT ESOPHAGITIS: Primary | ICD-10-CM

## 2020-04-22 RX ORDER — PANTOPRAZOLE SODIUM 40 MG/1
40 TABLET, DELAYED RELEASE ORAL DAILY
Qty: 90 TABLET | Refills: 1 | Status: SHIPPED | OUTPATIENT
Start: 2020-04-22 | End: 2020-04-24

## 2020-04-22 NOTE — TELEPHONE ENCOUNTER
Omeprazole 20 mg not covered by insurance - switching to pantoprazole 40 mg daily.  Ewelina Crandall RN  [facility]

## 2020-04-24 DIAGNOSIS — K21.9 GASTROESOPHAGEAL REFLUX DISEASE WITHOUT ESOPHAGITIS: Primary | ICD-10-CM

## 2020-04-24 RX ORDER — LANSOPRAZOLE 30 MG/1
30 CAPSULE, DELAYED RELEASE ORAL DAILY
Qty: 90 CAPSULE | Refills: 1 | Status: SHIPPED | OUTPATIENT
Start: 2020-04-24 | End: 2020-04-28

## 2020-04-24 NOTE — TELEPHONE ENCOUNTER
Change pantoprazole to lansoprazole per insurance note.  Ewelina Crandall RN  [facility]    4/28/20 - spoke to pharmacy - none of PPI's covered - they will tell pt to purchase prilosec 20 mg over the counter.   Ewelina Crandall RN  [facility]

## 2020-05-22 ENCOUNTER — TELEPHONE (OUTPATIENT)
Dept: FAMILY MEDICINE | Facility: CLINIC | Age: 70
End: 2020-05-22

## 2020-05-22 DIAGNOSIS — K21.9 GASTROESOPHAGEAL REFLUX DISEASE WITHOUT ESOPHAGITIS: ICD-10-CM

## 2020-05-22 NOTE — TELEPHONE ENCOUNTER
Spoke to pt - he is willing to have omeprazole 20 mg filled, whether it is covered by insurance, or not.  He will work with the pharmacy for best coverage - he will call clinic prn.  Script to pharmacy.  Ewelina Crandall RN  [facility]

## 2020-05-22 NOTE — TELEPHONE ENCOUNTER
LM for pt to call back re: this - insurance had not wanted to cover PPI's in April - he is to call back to discuss.  Ewelina Crandall RN  [facility]

## 2020-05-22 NOTE — TELEPHONE ENCOUNTER
Health Call Center    Phone Message    May a detailed message be left on voicemail: yes     Reason for Call: Medication Question or concern regarding medication   Prescription Clarification  Name of Medication: omeprazole (PRILOSEC) 20 MG DR capsule   Prescribing Provider: Dr. Pisano   Pharmacy: [facility] [id number] [address]    What on the order needs clarification? Patient would like a call back from the Nurse to discuss this medication. Patient states that the Pharmacy told him that they could not get in contact with the clinic for the refill request. There are note in the chart from 04/22/2020. Please advise.     Action Taken: Message routed to:  [facility]: [facility]    Travel Screening: Not Applicable

## 2020-05-22 NOTE — TELEPHONE ENCOUNTER
Last visit 12/19/19 , no future visit  Prescription approved per [facility] Refill Protocol.  Ewelina Crandall RN  [facility]

## 2020-06-29 DIAGNOSIS — I10 ESSENTIAL HYPERTENSION: ICD-10-CM

## 2020-06-29 NOTE — TELEPHONE ENCOUNTER
Last time prescribed: 12/20/19 , 90 tabs x 3 refills  Last office visit: 12/19/19  Next appointment: No future appointments    Pt already has refills ,  Pharmacy just asking to resend as for some reason it got inactivated.    Prescription approved per [facility] Refill Protocol.      Lesli Estrada RN  June 30, 2020 4:36 PM

## 2020-06-30 ENCOUNTER — TELEPHONE (OUTPATIENT)
Dept: FAMILY MEDICINE | Facility: CLINIC | Age: 70
End: 2020-06-30

## 2020-06-30 RX ORDER — HYDROCHLOROTHIAZIDE 25 MG/1
25 TABLET ORAL DAILY
Qty: 90 TABLET | Refills: 1 | Status: SHIPPED | OUTPATIENT
Start: 2020-06-30 | End: 2020-12-22

## 2020-06-30 NOTE — TELEPHONE ENCOUNTER
Refill completed,  See that enc for more details    Lesli Estrada RN  June 30, 2020 4:39 PM

## 2020-06-30 NOTE — TELEPHONE ENCOUNTER
[facility] Call Center    Phone Message    May a detailed message be left on voicemail: yes     Reason for Call: Medication Refill Request    Has the patient contacted the pharmacy for the refill? Yes   Name of medication being requested: hydrochlorothiazide (HYDRODIURIL) 25 MG tablet   Provider who prescribed the medication: Dr. Pisano  Pharmacy: [facility] [id number] [address]  Date medication is needed: 6/30/20         Action Taken: Message routed to:  [facility]: Primary Care    Travel Screening: Not Applicable

## 2020-07-07 DIAGNOSIS — I10 ESSENTIAL HYPERTENSION: ICD-10-CM

## 2020-07-07 RX ORDER — PROPRANOLOL HYDROCHLORIDE 40 MG/1
TABLET ORAL
Qty: 90 TABLET | Refills: 1 | Status: SHIPPED | OUTPATIENT
Start: 2020-07-07 | End: 2020-12-29

## 2020-07-07 NOTE — TELEPHONE ENCOUNTER
Last time prescribed: 07/09/2019 , 90 tabs x 3 refills  Last office visit: 12/19/2019  Next appointment: No future appointments    Prescription approved per [facility] [place] Refill Protocol.    Ewelina Crandall RN  [facility]

## 2020-08-04 DIAGNOSIS — B35.6 TINEA CRURIS: ICD-10-CM

## 2020-08-04 NOTE — TELEPHONE ENCOUNTER
Last time prescribed: 05/01/2019 , 30 g x 1 refills  Last office visit: 12/19/2019  Next appointment: No future appointments    Prescription approved per [facility] Refill Protocol.  Lorena Chong RN  08/05/20  10:59 AM

## 2020-08-05 DIAGNOSIS — K21.9 GASTROESOPHAGEAL REFLUX DISEASE WITHOUT ESOPHAGITIS: ICD-10-CM

## 2020-08-05 RX ORDER — KETOCONAZOLE 20 MG/G
CREAM TOPICAL 2 TIMES DAILY
Qty: 30 G | Refills: 0 | Status: SHIPPED | OUTPATIENT
Start: 2020-08-05 | End: 2021-11-10

## 2020-08-05 RX ORDER — PANTOPRAZOLE SODIUM 40 MG/1
40 TABLET, DELAYED RELEASE ORAL DAILY
Qty: 90 TABLET | Refills: 1 | Status: SHIPPED | OUTPATIENT
Start: 2020-08-05 | End: 2020-12-22

## 2020-08-05 NOTE — TELEPHONE ENCOUNTER
Pt's insurance will not cover omeprazole now       Pharmacist sent alternatives .    Aliya notified.  He will write for Protonix 40 mg daily.    Routing refill request to provider for review/approval because:  Drug interaction warning    Script sent in for pt    Lesli Estrada RN  August 5, 2020 2:02 PM

## 2020-08-06 ENCOUNTER — TELEPHONE (OUTPATIENT)
Dept: FAMILY MEDICINE | Facility: CLINIC | Age: 70
End: 2020-08-06

## 2020-08-06 NOTE — TELEPHONE ENCOUNTER
Patient reports occasional heart palpitations. He has had these in the past, was evaluated by MD, feels that due to pandemic he is feeling more stressed and now feels these again. Denies chest pain, dizziness, difficulty breathing, or several palpitations in a minute. He notices them mostly at rest and does not notice with exertion. He agrees to schedule an in-clinic visit. Patient to call back if any symptoms develop or if palpitations worsen.    Lorena Chong RN  08/06/20  3:03 PM

## 2020-08-12 ENCOUNTER — OFFICE VISIT (OUTPATIENT)
Dept: FAMILY MEDICINE | Facility: CLINIC | Age: 70
End: 2020-08-12
Payer: COMMERCIAL

## 2020-08-12 VITALS
DIASTOLIC BLOOD PRESSURE: 91 MMHG | TEMPERATURE: 97.3 F | WEIGHT: 250 LBS | BODY MASS INDEX: 38.33 KG/M2 | HEART RATE: 50 BPM | OXYGEN SATURATION: 99 % | SYSTOLIC BLOOD PRESSURE: 167 MMHG | RESPIRATION RATE: 15 BRPM

## 2020-08-12 DIAGNOSIS — E55.9 VITAMIN D DEFICIENCY: ICD-10-CM

## 2020-08-12 DIAGNOSIS — R00.2 PALPITATIONS: Primary | ICD-10-CM

## 2020-08-12 DIAGNOSIS — R73.09 ELEVATED GLUCOSE LEVEL: ICD-10-CM

## 2020-08-12 LAB
ANION GAP SERPL CALCULATED.3IONS-SCNC: <1 MMOL/L (ref 3–14)
BUN SERPL-MCNC: 15 MG/DL (ref 7–30)
CALCIUM SERPL-MCNC: 9.3 MG/DL (ref 8.5–10.1)
CHLORIDE SERPL-SCNC: 101 MMOL/L (ref 94–109)
CO2 SERPL-SCNC: 34 MMOL/L (ref 20–32)
CREAT SERPL-MCNC: 0.99 MG/DL (ref 0.66–1.25)
GFR SERPL CREATININE-BSD FRML MDRD: 78 ML/MIN/{1.73_M2}
GLUCOSE SERPL-MCNC: 100 MG/DL (ref 70–99)
HBA1C MFR BLD: 6.2 % (ref 4.1–5.7)
MAGNESIUM SERPL-MCNC: 1.8 MG/DL (ref 1.6–2.3)
POTASSIUM SERPL-SCNC: 3.5 MMOL/L (ref 3.4–5.3)
SODIUM SERPL-SCNC: 135 MMOL/L (ref 133–144)
TSH SERPL DL<=0.005 MIU/L-ACNC: 0.77 MU/L (ref 0.4–4)

## 2020-08-12 RX ORDER — OMEPRAZOLE 20 MG/1
20 TABLET, DELAYED RELEASE ORAL DAILY
COMMUNITY
End: 2021-05-18

## 2020-08-12 NOTE — NURSING NOTE
70 year old  Chief Complaint   Patient presents with     Irregular Heart Beat     has had in the past currently does not feel the irrgular beat       Blood pressure (!) 167/91, pulse 50, temperature 97.3  F (36.3  C), temperature source Skin, resp. rate 15, weight 113.4 kg (250 lb), SpO2 99 %. Body mass index is 38.33 kg/m .  Patient Active Problem List   Diagnosis     Hypertension     Obstructive sleep apnea     Prediabetes     Left bundle branch block       Wt Readings from Last 2 Encounters:   08/12/20 113.4 kg (250 lb)   12/19/19 110.1 kg (242 lb 12 oz)     BP Readings from Last 3 Encounters:   08/12/20 (!) 167/91   12/19/19 138/74   07/31/19 128/78         Current Outpatient Medications   Medication     hydrochlorothiazide (HYDRODIURIL) 25 MG tablet     hydrocortisone (WESTCORT) 0.2 % cream     ketoconazole (NIZORAL) 2 % external cream     modafinil (PROVIGIL) 100 MG tablet     omeprazole (PRILOSEC OTC) 20 MG EC tablet     propranolol (INDERAL) 40 MG tablet     pantoprazole (PROTONIX) 40 MG EC tablet     No current facility-administered medications for this visit.        Social History     Tobacco Use     Smoking status: Current Some Day Smoker     Years: 8.00     Types: Cigars     Start date: 6/15/2008     Smokeless tobacco: Never Used   Substance Use Topics     Alcohol use: Yes     Comment: 7     Drug use: No       Health Maintenance Due   Topic Date Due     DIABETIC FOOT EXAM  1950     ADVANCE CARE PLANNING  1950     HEPATITIS B IMMUNIZATION (1 of 3 - Risk 3-dose series) 03/03/1971     ZOSTER IMMUNIZATION (2 of 3) 03/14/2016     PHQ-2  01/01/2020     A1C  03/19/2020     MEDICARE ANNUAL WELLNESS VISIT  05/30/2020       No results found for: PAP      August 12, 2020 2:46 PM

## 2020-08-13 LAB — DEPRECATED CALCIDIOL+CALCIFEROL SERPL-MC: 45 UG/L (ref 20–75)

## 2020-08-15 NOTE — PROGRESS NOTES
"CHIEF COMPLAINT:  Irregular heartbeat.      HISTORY OF PRESENT ILLNESS:  Dev is a 70-year-old with hypertension, obstructive sleep apnea, diabetes and a history of a left bundle branch block, complaining of occasional skipped heartbeats.      He states that every once in a while he feels like his heart pauses, \"then catches up.\"  He can feel it in his neck and throat at times and it almost feels like an \"inflation.\"  There has never been any pain associated with it.  He does not feel lightheaded.  He has been presyncopal in any way.      He has certainly had more stress (mainly from watching TV news), his sleep has been a little disrupted, he has caffeine every day and he is using some tobacco products.      Getting more into these details, he drinks at least 1 expresso every morning and sometimes is doing more than that.  He is not really limiting himself just to 1.  He typically has 1 cigar a day but occasionally will do 1 or 2 more.  He understands that each cigar is equivalent to several cigarettes and he should really stop entirely.  As far as sleep is concerned, he has sleep apnea.  He feels his equipment is working well.  He is not snoring.  However, he often wakes up at 1 or 2 in the morning.  It is hard to fall back asleep.  His therapist wonders if he might be having a bit of a \"sugar crash.\"  He typically has 1-2 glasses of wine a night and indicated that though it often feels as though that is a depressant it can have stimulating effect as well.      He is seeing a therapist and is touching base about every 2 weeks.  He is walking 45 minutes a day 4 days a week.  His weight is up a little bit and he is trying to counter that by being more physically active.      We reviewed his medications.  Importantly, he takes modafinil for some excessive daytime sleepiness as prescribed by his sleep doctor.  He has not made any association with that causing the palpitations.  There seems to be no direct " correlation.      ACTIVE MEDICAL PROBLEMS:  Reviewed.      CURRENT MEDICATIONS:  Reviewed.      OBJECTIVE:  Dev is in absolutely no distress.  Affect upbeat.  He is wearing a mask.  He was not coughing.  No tachypnea.  No diaphoresis.  Blood pressure was high at 167/91.  He will start checking at home.  He has a blood pressure monitor there.  The last 2 times we checked in the office it was 128-138/74-78.  Hopefully, he is more along those lines.  He is taking hydrochlorothiazide 25 mg once daily.  Pulse 50 and regular.  Temperature is 97.3.  O2 sats are 99% on room air.  BMI 38.33.  His weight is up about 7 pounds from last December.      Auscultation of the heart reveals a regular rate and rhythm without murmur.  No carotid bruits are heard.  Lungs are clear to auscultation.  No ankle edema.      EKG was normal.  Rhythm strip was normal.  In both cases, no ectopy was noted.  In fact, there was no significant left bundle branch block, although it has been noted previously.      Labs today will consist of the following:  Vitamin D, TSH with cascade, magnesium, metabolic panel and an A1c.      ASSESSMENT AND PLAN:   1.  Occasional palpitations, generally asymptomatic other than just noticing they are present.  I would strongly recommend that he work on decreasing stress, increasing activity, decreasing tobacco use, decreasing his alcohol, decreasing his consumption of television news, which has been giving him a lot of stress.   2.  We will get the laboratory results back and notify him.  We will make any changes necessary there.   3.  If palpitations continue, we can certainly order a Zio Patch, either 7 days or 28 days based on what his symptoms are like.  Call with any problems or questions.  He is in complete agreement with the above plan.      Total time spent with Dev today was over 25 minutes, over half the time was spent in care coordination and counseling.

## 2020-10-01 ENCOUNTER — ALLIED HEALTH/NURSE VISIT (OUTPATIENT)
Dept: FAMILY MEDICINE | Facility: CLINIC | Age: 70
End: 2020-10-01
Payer: COMMERCIAL

## 2020-10-01 DIAGNOSIS — Z23 NEED FOR PROPHYLACTIC VACCINATION AND INOCULATION AGAINST INFLUENZA: Primary | ICD-10-CM

## 2020-10-01 NOTE — PROGRESS NOTES
"Injectable Influenza Immunization Documentation    1.  Has the patient received the information for the injectable influenza vaccine? YES     2. Is the patient 6 months of age or older? YES     3. Does the patient have any of the following contraindications?         Severe allergy to eggs? No     Severe allergic reaction to previous influenza vaccines? No   Severe allergy to latex? No       History of Guillain-Poplar syndrome? No     Currently have a temperature greater than 100.4F? No        4.  Severely egg allergic patients should have flu vaccine eligibility assessed by an MD, RN, or pharmacist, and those who received flu vaccine should be observed for 15 min by an MD, RN, Pharmacist, Medical Technician, or member of clinic staff.\": YES    5. Latex-allergic patients should be given latex-free influenza vaccine Yes. Please reference the Vaccine latex table to determine if your clinic s product is latex-containing.       Vaccination given by Esete Ferguson CMA,[facility]  October 1, 2020 10:22 AM        Dev LISA Martinez comes into clinic today at the request of Dr. Pisano Ordering Provider for Flu shot.    This service provided today was under the supervising provider of the day Dr. Pisano, who was available if needed.    Estee Ferguson CMA          "

## 2020-10-05 DIAGNOSIS — I10 ESSENTIAL HYPERTENSION: Primary | ICD-10-CM

## 2020-10-05 RX ORDER — LISINOPRIL 5 MG/1
5 TABLET ORAL DAILY
Qty: 90 TABLET | Refills: 0 | Status: SHIPPED | OUTPATIENT
Start: 2020-10-05 | End: 2020-12-29

## 2020-11-17 ENCOUNTER — TELEPHONE (OUTPATIENT)
Dept: FAMILY MEDICINE | Facility: CLINIC | Age: 70
End: 2020-11-17

## 2020-11-17 NOTE — TELEPHONE ENCOUNTER
Prior Authorization Retail Medication Request    Medication/Dose: omeprazole 20 mg  ICD code (if different than what is on RX):  same  Previously Tried and Failed:    Rationale:      Insurance Name:  same  Insurance ID:  same      Pharmacy Information (if different than what is on RX)  [facility]  Phone:  [phone]  Ewelina Crandall RN  [facility]

## 2020-11-17 NOTE — TELEPHONE ENCOUNTER
Central Prior Authorization Team   [phone]    PA Initiation    Medication: omeprazole 20 mg  Insurance Company: [facility] [employer] - Phone [phone] Fax [phone]  Ph[facility] the [id number] [place], [place] - [address] [place] [address] [address]  Filling Pharmacy Phone: [phone]  Filling Pharmacy Fax: [phone]  Start Date: 11/17/2020

## 2020-11-18 NOTE — TELEPHONE ENCOUNTER
Prior Authorization Approval    Authorization Effective Date: 11/17/2020  Authorization Expiration Date: 11/17/2021  Medication: omeprazole 20 mg-PA APPROVED   Approved Dose/Quantity:   Reference #:     Insurance Company: [facility] [employer] - Phone [phone] Fax [phone]  Expected CoPay: $9.28     CoPay Card Available:      Foundation Assistance Needed:    Which Pharmacy is filling the prescription (Not needed for infusion/clinic administered): [facility] [address] [place] [address]  Pharmacy Notified: Yes- **Instructed pharmacy to notify patient when script is ready to /ship.**  Patient Notified: Yes

## 2020-12-06 ENCOUNTER — HEALTH MAINTENANCE LETTER (OUTPATIENT)
Age: 70
End: 2020-12-06

## 2020-12-19 DIAGNOSIS — I10 ESSENTIAL HYPERTENSION: ICD-10-CM

## 2020-12-21 ENCOUNTER — TELEPHONE (OUTPATIENT)
Dept: FAMILY MEDICINE | Facility: CLINIC | Age: 70
End: 2020-12-21

## 2020-12-21 NOTE — TELEPHONE ENCOUNTER
Last time prescribed: 06/30/2020 , 90 tabs x 1 refills  Last office visit: 08/12/2020 - for acute problem  Next appointment: No future appointments    BP Readings from Last 3 Encounters:   08/12/20 (!) 167/91   12/19/19 138/74   07/31/19 128/78     Lorena Chong, RN  12/22/20  12:33 PM

## 2020-12-21 NOTE — TELEPHONE ENCOUNTER
LVM for patient to call back and schedule his annual wellness visit with PCP. Dr. Pisano does have some openings in march for EPP if patient would like to get schedule but there looks to be nothing sooner.    Porter Sierra

## 2020-12-22 RX ORDER — HYDROCHLOROTHIAZIDE 25 MG/1
25 TABLET ORAL DAILY
Qty: 90 TABLET | Refills: 0 | Status: SHIPPED | OUTPATIENT
Start: 2020-12-22 | End: 2021-03-11

## 2020-12-28 DIAGNOSIS — I10 ESSENTIAL HYPERTENSION: ICD-10-CM

## 2020-12-28 NOTE — TELEPHONE ENCOUNTER
Lisinopril: Last time prescribed: 10/5/20 , 90 tabs/caps x 0 refills  Propranolol: Last time prescribed: 7/7/20 , 90 tabs x 1 refills     Last office visit: 8/12/20  Next appointment: 3/11/21    Medication is being filled for 1 time refill only due to:  Patient needs to be seen because needs BP recheck .Pt has an appt in 3 months time. Last BP in August 2020 was 167/91.  Pt sent home BP's after this visit,  MD deciphering his average BP at home is 143/80 , later in September sent more BP;'s and MD added lisinopril       Lesli Estrada, RN  December 29, 2020 2:03 PM

## 2020-12-29 RX ORDER — LISINOPRIL 5 MG/1
5 TABLET ORAL DAILY
Qty: 90 TABLET | Refills: 0 | Status: SHIPPED | OUTPATIENT
Start: 2020-12-29 | End: 2021-03-11

## 2020-12-29 RX ORDER — PROPRANOLOL HYDROCHLORIDE 40 MG/1
TABLET ORAL
Qty: 90 TABLET | Refills: 0 | Status: SHIPPED | OUTPATIENT
Start: 2020-12-29 | End: 2021-03-11

## 2021-03-09 RX ORDER — PROPRANOLOL HYDROCHLORIDE 40 MG/1
TABLET ORAL
Qty: 90 TABLET | Refills: 0 | Status: CANCELLED | OUTPATIENT
Start: 2021-03-09

## 2021-03-10 DIAGNOSIS — I10 ESSENTIAL HYPERTENSION: ICD-10-CM

## 2021-03-10 RX ORDER — PROPRANOLOL HYDROCHLORIDE 40 MG/1
TABLET ORAL
Qty: 90 TABLET | Refills: 0 | Status: CANCELLED | OUTPATIENT
Start: 2021-03-10

## 2021-03-10 NOTE — TELEPHONE ENCOUNTER
Who is calling? patient  Medication name: propranolol (INDERAL) 40 MG tablet  Is this a refill request? Yes  Have they contacted the pharmacy?  Yes  Pharmacy:   [place] [address] [place] [address] Phone:  [phone]   Fax:  [phone]      Question/Concern: Patient requesting medication refill. States he contacted his pharmacy who told him that they sent over a request recently.   Would patient like a call back? No    Med refilled today per Dr Pisano at clinic visit.  Ewelina Crandall RN  [facility]

## 2021-03-11 ENCOUNTER — OFFICE VISIT (OUTPATIENT)
Dept: FAMILY MEDICINE | Facility: CLINIC | Age: 71
End: 2021-03-11
Payer: COMMERCIAL

## 2021-03-11 DIAGNOSIS — E66.01 MORBID OBESITY (H): ICD-10-CM

## 2021-03-11 DIAGNOSIS — Z12.5 SCREENING FOR PROSTATE CANCER: ICD-10-CM

## 2021-03-11 DIAGNOSIS — Z13.220 SCREENING FOR LIPID DISORDERS: ICD-10-CM

## 2021-03-11 DIAGNOSIS — H90.3 BILATERAL SENSORINEURAL HEARING LOSS: ICD-10-CM

## 2021-03-11 DIAGNOSIS — I10 ESSENTIAL HYPERTENSION: ICD-10-CM

## 2021-03-11 DIAGNOSIS — R73.03 PREDIABETES: ICD-10-CM

## 2021-03-11 DIAGNOSIS — G47.33 OBSTRUCTIVE SLEEP APNEA: ICD-10-CM

## 2021-03-11 DIAGNOSIS — Z00.00 MEDICARE ANNUAL WELLNESS VISIT, SUBSEQUENT: Primary | ICD-10-CM

## 2021-03-11 LAB
BUN SERPL-MCNC: 12 MG/DL (ref 7–30)
CALCIUM SERPL-MCNC: 9.2 MG/DL (ref 8.5–10.4)
CHLORIDE SERPLBLD-SCNC: 96 MMOL/L (ref 94–109)
CHOLEST SERPL-MCNC: 175 MG/DL (ref 0–200)
CHOLEST/HDLC SERPL: 3.6 {RATIO} (ref 0–5)
CO2 SERPL-SCNC: 31 MMOL/L (ref 20–32)
CREAT SERPL-MCNC: 1.1 MG/DL (ref 0.8–1.5)
EGFR CALCULATED (BLACK REFERENCE): 85.4
EGFR CALCULATED (NON BLACK REFERENCE): 70.5
FASTING SPECIMEN: YES
GLUCOSE SERPL-MCNC: 142 MG/DL (ref 60–99)
HBA1C MFR BLD: 6.8 % (ref 4.1–5.7)
HDLC SERPL-MCNC: 49 MG/DL
LDLC SERPL CALC-MCNC: 97 MG/DL (ref 0–129)
POTASSIUM SERPL-SCNC: 3.7 MMOL/L (ref 3.4–5.3)
PSA SERPL-ACNC: 1.6 UG/L (ref 0–4)
SODIUM SERPL-SCNC: 140 MMOL/L (ref 137.3–146.3)
TRIGL SERPL-MCNC: 146 MG/DL (ref 0–150)
VLDL-CHOLESTEROL: 29 (ref 7–32)

## 2021-03-11 RX ORDER — LISINOPRIL 5 MG/1
5 TABLET ORAL DAILY
Qty: 90 TABLET | Refills: 4 | Status: SHIPPED | OUTPATIENT
Start: 2021-03-11 | End: 2022-05-10

## 2021-03-11 RX ORDER — HYDROCHLOROTHIAZIDE 25 MG/1
25 TABLET ORAL DAILY
Qty: 90 TABLET | Refills: 4 | Status: SHIPPED | OUTPATIENT
Start: 2021-03-11 | End: 2022-05-10

## 2021-03-11 RX ORDER — PROPRANOLOL HYDROCHLORIDE 40 MG/1
TABLET ORAL
Qty: 90 TABLET | Refills: 4 | Status: SHIPPED | OUTPATIENT
Start: 2021-03-11 | End: 2022-05-10

## 2021-03-11 SDOH — HEALTH STABILITY: MENTAL HEALTH: HOW OFTEN DO YOU HAVE 6 OR MORE DRINKS ON ONE OCCASION?: NOT ASKED

## 2021-03-11 SDOH — HEALTH STABILITY: MENTAL HEALTH: HOW OFTEN DO YOU HAVE A DRINK CONTAINING ALCOHOL?: 4 OR MORE TIMES A WEEK

## 2021-03-11 SDOH — HEALTH STABILITY: MENTAL HEALTH: HOW MANY STANDARD DRINKS CONTAINING ALCOHOL DO YOU HAVE ON A TYPICAL DAY?: 1 OR 2

## 2021-03-11 ASSESSMENT — MIFFLIN-ST. JEOR: SCORE: 1944.94

## 2021-03-11 NOTE — NURSING NOTE
"71 year old  Chief Complaint   Patient presents with     Physical     no other concerns        Blood pressure (!) 147/89, pulse 67, temperature 97  F (36.1  C), temperature source Skin, resp. rate 15, height 1.753 m (5' 9\"), weight 119 kg (262 lb 4 oz), SpO2 99 %. Body mass index is 38.73 kg/m .  Patient Active Problem List   Diagnosis     Hypertension     Obstructive sleep apnea     Prediabetes     Left bundle branch block       Wt Readings from Last 2 Encounters:   03/11/21 119 kg (262 lb 4 oz)   08/12/20 113.4 kg (250 lb)     BP Readings from Last 3 Encounters:   03/11/21 (!) 147/89   08/12/20 (!) 167/91   12/19/19 138/74         Current Outpatient Medications   Medication     hydrochlorothiazide (HYDRODIURIL) 25 MG tablet     hydrocortisone (WESTCORT) 0.2 % cream     ketoconazole (NIZORAL) 2 % external cream     lisinopril (ZESTRIL) 5 MG tablet     omeprazole (PRILOSEC OTC) 20 MG EC tablet     propranolol (INDERAL) 40 MG tablet     modafinil (PROVIGIL) 100 MG tablet     No current facility-administered medications for this visit.        Social History     Tobacco Use     Smoking status: Current Some Day Smoker     Years: 8.00     Types: Cigars     Start date: 6/15/2008     Smokeless tobacco: Never Used   Substance Use Topics     Alcohol use: Yes     Frequency: 4 or more times a week     Drinks per session: 1 or 2     Comment: 7     Drug use: No       Health Maintenance Due   Topic Date Due     DIABETIC FOOT EXAM  Never done     ADVANCE CARE PLANNING  Never done     ZOSTER IMMUNIZATION (2 of 3) 03/14/2016     MEDICARE ANNUAL WELLNESS VISIT  05/30/2020     A1C  11/12/2020     LIPID  12/19/2020     MICROALBUMIN  12/19/2020     FALL RISK ASSESSMENT  12/19/2020     EYE EXAM  02/20/2021     COVID-19 Vaccine (2 of 2 - Pfizer series) 03/17/2021       No results found for: PAP      March 11, 2021 8:05 AM    "

## 2021-03-11 NOTE — PATIENT INSTRUCTIONS
1. Consider getting your [facility]les [facility] sometime this year/    2. Please check BP at home and send me ~12 readings (over 12 days)

## 2021-03-14 VITALS
OXYGEN SATURATION: 99 % | WEIGHT: 262.25 LBS | HEIGHT: 69 IN | RESPIRATION RATE: 15 BRPM | HEART RATE: 66 BPM | SYSTOLIC BLOOD PRESSURE: 138 MMHG | BODY MASS INDEX: 38.84 KG/M2 | TEMPERATURE: 97 F | DIASTOLIC BLOOD PRESSURE: 78 MMHG

## 2021-03-14 NOTE — PROGRESS NOTES
CHIEF COMPLAINT:  Medicare annual wellness visit, subsequent.      HISTORY OF PRESENT ILLNESS:  Dev is a 71-year-old male here for the above.  Overall, he is doing well.  He has no real concerns.  He has a history of obstructive sleep apnea and it has been years since he has had his status checked.  He would like a referral to one of our sleep clinics.  He has essential hypertension, which is typically under fairly good control at home.  He is on triple drug therapy, including hydrochlorothiazide 25 mg daily, lisinopril 5 mg daily and Inderal 40 mg generally once daily.  However, he recently ran out of this.  That could explain why his blood pressure is a little bit higher than it had been when it was under best control.      MEDICATIONS:  Reviewed in detail.  He is no longer taking modafinil and he knows that was probably elevating his blood pressure a little bit.      SOCIAL, FAMILY AND PAST SURGICAL HISTORIES:  Reviewed and unchanged.      HEALTHCARE MAINTENANCE:  He is due to see his ophthalmologist.  He wears glasses.  He has hearing aids, but there has been no change.  He is due for dental care and it has been a little over a year.  Blood pressure as mentioned.  Body mass index is up to 38.73.  Weight is up about 12 pounds from last summer.  He knows it is due to not walking as much as he should.  He has cut back on his alcohol consumption.  From an immunization standpoint, he had his first COVID vaccine on 02/24 and the second one is scheduled.  He is up to date with influenza, pneumococcal, and Tdap immunizations.  He had the first generation of the shingles vaccine in 2016 and he will likely get the second generation vaccine sometime after he gets his final COVID immunization.         From a lab standpoint, we will check the following:  Metabolic panel, A1c, lipid panel and PSA 50.  He is up to date with colon cancer screening and had a colonoscopy in 2016.      Last audiogram was in 2015.  He is willing  to go back and would like to get his hearing updated.      MEDICARE QUESTIONS:  No problems with activities of daily living at home.  He has had no falls in the last year.  No symptoms of depression in the last month.  No memory loss concerns.      OBJECTIVE:  He appears in no distress.  Affect upbeat.  He is alert and oriented x3.  Breathing comfortably.  No wheezing or coughing.  /89 and 143/89.  Pulse 67 and regular.  He is afebrile with a temperature of 97.  He is 5 feet 9 inches and weighs 162 pounds 4 ounces with a BMI of 38.73.  O2 sats are 99% on room air.     HEENT:  Head is normocephalic, atraumatic.  Ears are free of any significant cerumen.  The TMs look fine.  There is no pain with palpation of the frontal or maxillary sinuses.  Eyes are grossly normal.  He is wearing a mask.   NECK:  Supple without adenopathy or thyromegaly.  No carotid bruits are heard, no JVD, no visible or palpable thyromegaly.   LUNGS:  Clear to auscultation bilaterally.   HEART:  Regular rate and rhythm without murmur.   ABDOMEN:  Obese.   EXTREMITIES:  Ankles free of any edema.      LABORATORY:  Labs will consist of BMP, lipid panel, A1c and a PSA 50.      ASSESSMENT AND PLAN:   1.  Medicare annual wellness visit, subsequent, up to date with healthcare maintenance strategies.   2.  Second COVID vaccine pending.  Once that is done, sometime in this next year, getting the shingles vaccines through his pharmacy would be great.   3.  Essential hypertension, under fairly good control at home in the 138/76 range.  A little high here.  Continue with current medications.  Labs are pending.   4.  Screening for hyperlipidemia.  Lipid panel pending.   5.  Screening for prostate cancer with a PSA 50.   6.  Up to date with colon cancer screening.   7.  Long history of obstructive sleep apnea.  He has CPAP equipment.  He would like to be reevaluated.  Referral has been placed.   8.  He has morbid obesity with a BMI currently at 38.73.   He is really going to try and work on weight loss.  He fully understands how this is likely affecting his sleep apnea and blood pressure.   9.  History of bilateral sensorineural hearing loss.  He needs to be checked and may need new hearing aids.   10.  History of prediabetes.  We will see where things are at.  We will discuss metformin, but he would be better off trying to do this on his own without resorting to medication but we can certainly go that direction if we need to.  Call with any problems or questions.  I will look forward to seeing him back in a year and will be in touch in the meantime.

## 2021-03-24 DIAGNOSIS — R73.03 PREDIABETES: Primary | ICD-10-CM

## 2021-05-13 ENCOUNTER — VIRTUAL VISIT (OUTPATIENT)
Dept: SLEEP MEDICINE | Facility: CLINIC | Age: 71
End: 2021-05-13
Attending: FAMILY MEDICINE
Payer: COMMERCIAL

## 2021-05-13 VITALS — WEIGHT: 251 LBS | HEIGHT: 68 IN | BODY MASS INDEX: 38.04 KG/M2

## 2021-05-13 DIAGNOSIS — G47.33 OBSTRUCTIVE SLEEP APNEA: ICD-10-CM

## 2021-05-13 PROCEDURE — 99204 OFFICE O/P NEW MOD 45 MIN: CPT | Mod: GT | Performed by: FAMILY MEDICINE

## 2021-05-13 ASSESSMENT — MIFFLIN-ST. JEOR: SCORE: 1878.03

## 2021-05-13 NOTE — PROGRESS NOTES
"Dev Martinez is a 71 year old male who is being evaluated via a billable video visit.       The patient has been notified of following:      \"This video visit will be conducted via a call between you and your physician/provider. We have found that certain health care needs can be provided without the need for an in-person physical exam.  This service lets us provide the care you need with a video conversation.  If a prescription is necessary we can send it directly to your pharmacy.  If lab work is needed we can place an order for that and you can then stop by our lab to have the test done at a later time.     Video visits are billed at different rates depending on your insurance coverage.  Please reach out to your insurance provider with any questions.     If during the course of the call the physician/provider feels a video visit is not appropriate, you will not be charged for this service.\"     Patient has given verbal consent for Video visit? Yes  How would you like to obtain your AVS? Mail a copy  If you are dropped from the video visit, the video invite should be resent to: Text to cell phone: -  Will anyone else be joining your video visit? No  If patient encounters technical issues they should call [phone]      Video-Visit Details     Type of service:  Video Visit     Video Start Time: 10am  Video End Time: 10:40am    Originating Location (pt. Location): Home     Distant Location (provider location):  [facility]      Platform used for Video Visit: NPC III    Virtual visit for establishing care for obstructive sleep apnea managed with CPAP and chronic insomnia.     Assessment:  - History of moderate AVA with unclear sleep-associated hypoxemia  - Chronic sleep maintenance insomnia, presumed secondary to excessive time in bed  - Prior evidence of residual hypersomnia with PAP therapy    Plan:  -We will attempt to get a up-to-date download from his CPAP, and otherwise I did " not have a specific concern with writing for a new CPAP supply prescription.  -We discussed behavioral modification for chronic insomnia, with my main focus on stimulus control and specifically sleep restriction.  We will make his bedtime no earlier than 930 or 10 PM.  -Plan for follow-up in 1 month to review CPAP data and also to review hopeful improvement in chronic insomnia.    SUBJECTIVE:  Dev Martinez is a 71 year old year old male.    Pertinent PMHx of obesity, LBBB, pre-diabetes, AVA, HTN.    I spent approximately 10 minutes reviewing records.  Reviewed one sleep [facility] in [place] with Dr. Whitmore on 11/3/2016.  Note of sleep testing on 5/24/2006 with AHI 19, RDI 34, milena SpO2 75%, unknown weight.  Treated with CPAP auto-titrate 5-15 cm H2O.  Usage at this time was excellent with usage ~7.2 hours per night, AHI 0.7, 95trh%ile pressure 11.8 cm H2O.    Today - He presents to establish care with need for updated CPAP supplies, and prolonged middle of the night awakenings.     I did not have a full CPAP download for review, but usage appears nightly and averaging 7-8 hours.    One of his main concerns today is that he previously used [facility] for his CPAP supplies, but they are no longer covered by his insurance, and the sleep clinic at [facility] was also not part of his plan, so he was choosing to transfer care here at this time.    As above, he reports using his CPAP on a nightly basis.  He denies any residual snoring, observed apnea, choking or gasping arousals.  His sleep has been worse over the past week due to some new low back pain that is presumed due to some form of pinched nerve.    His main concern otherwise with his sleep are some frequent prolonged awakenings in the middle the night.  It sounds of these been going on for a number of years.    Most nights he is in bed between 8-9 PM will usually fall asleep quickly.  On up to 50% of nights,  he will awaken after a few hours and then have a prolonged awakening, potentially 1+ hours.  During this time he will either lie in bed and hopefully return to sleep or he may get up and have a small carbohydrate snack.  He will then return to bed and will typically sleep until he wakes up for his alarm at 6 AM.    He is unsure if when he stays up later if he is less likely to have an awakening.    He seems to deny any obvious restless leg type symptoms, abnormal nocturnal behaviors.    He has found that his insomnia will usually be better if he is not watching TV near bedtime, he also thinks may be related to the lunar cycle.    Caffeine use is 1 or 2 drinks per day, none after 2 PM.  He denies any alcohol near bedtime.    Prior Sleep Testin2006 - PSG with weight 248 lbs.  Split night.  AHI 19, milena SpO2 83%.  CPAP titrated 5 -> 13 cm H2O with report of central apnea, transitioned to bilevel PAP 12/8 -> 17/10 cm H2O (presumed spontaneous) with continued events.  2012 - Maintenance of Wakefulness Test.  No sleep observed.  EKG noted for wide QRS complexes, bundle branch, sinus pauses.  2015 - PAP titration PSG.  Weight 267 lbs.  CPAP 13 cm H2O appearing effective.  Patient stayed for MSLT,  MSL 6.6 minutes and no SOREM's.    Past medical history:    Patient Active Problem List    Diagnosis Date Noted     Morbid obesity (H) 2021     Priority: Medium     Bilateral sensorineural hearing loss 2021     Priority: Medium     Prediabetes 2018     Priority: Medium     Hypertension 2014     Priority: Medium     Obstructive sleep apnea 2014     Priority: Medium     Medicare annual wellness visit, subsequent 2014     Priority: Medium     Left bundle branch block 10/16/2012     Priority: Medium       10 point ROS of systems including Constitutional, Eyes, Respiratory, Cardiovascular, Gastroenterology, Genitourinary, Integumentary, Muscularskeletal, Psychiatric were all  negative except for pertinent positives noted in my HPI.    Current Outpatient Medications   Medication Sig Dispense Refill     hydrochlorothiazide (HYDRODIURIL) 25 MG tablet Take 1 tablet (25 mg) by mouth daily 90 tablet 4     ketoconazole (NIZORAL) 2 % external cream Apply topically 2 times daily 30 g 0     lisinopril (ZESTRIL) 5 MG tablet Take 1 tablet (5 mg) by mouth daily 90 tablet 4     omeprazole (PRILOSEC OTC) 20 MG EC tablet Take 20 mg by mouth daily       propranolol (INDERAL) 40 MG tablet Take one-half tab in the AM and one-half tab in the PM. 90 tablet 4       OBJECTIVE:  There were no vitals taken for this visit.    Physical Exam     ---  This note was written with the assistance of the Dragon voice-dictation technology software. The final document, although reviewed, may contain errors. For corrections, please contact the office.    Humble Elise MD    Sleep Medicine  [facility]  ([phone])  [facility]  ([phone])

## 2021-05-13 NOTE — PROGRESS NOTES
"Do you have wifi? YES  Do you have a smart phone? YES  Can you download an hernandez on your phone comfortably with out assistance? YES  Can you watch a Youtube video? YES    Dev is a 71 year old who is being evaluated via a billable video visit.      How would you like to obtain your AVS? MyChart  If the video visit is dropped, the invitation should be resent by: Text to cell phone: [phone]   Will anyone else be joining your video visit? No  {If patient encounters technical issues they should call [phone] :[id number]}    Iliana Soto MA on 5/13/2021 at 9:41 AM    Video Start Time: {video visit start/end time for provider to select:152948}  Video-Visit Details    Type of service:  Video Visit    Video End Time:{video visit start/end time for provider to select:152948}    Originating Location (pt. Location): {video visit patient location:010247::\"Home\"}    Distant Location (provider location):  [facility]     Platform used for Video Visit: {Virtual Visit Platforms:912614::\"XOS Digital\"}    "

## 2021-05-13 NOTE — PATIENT INSTRUCTIONS
Your BMI is Body mass index is 38.16 kg/m .  Weight management is a personal decision.  If you are interested in exploring weight loss strategies, the following discussion covers the approaches that may be successful. Body mass index (BMI) is one way to tell whether you are at a healthy weight, overweight, or obese. It measures your weight in relation to your height.  A BMI of 18.5 to 24.9 is in the healthy range. A person with a BMI of 25 to 29.9 is considered overweight, and someone with a BMI of 30 or greater is considered obese. More than two-thirds of American adults are considered overweight or obese.  Being overweight or obese increases the risk for further weight gain. Excess weight may lead to heart disease and diabetes.  Creating and following plans for healthy eating and physical activity may help you improve your health.  Weight control is part of healthy lifestyle and includes exercise, emotional health, and healthy eating habits. Careful eating habits lifelong are the mainstay of weight control. Though there are significant health benefits from weight loss, long-term weight loss with diet alone may be very difficult to achieve- studies show long-term success with dietary management in less than 10% of people. Attaining a healthy weight may be especially difficult to achieve in those with severe obesity. In some cases, medications, devices and surgical management might be considered.  What can you do?  If you are overweight or obese and are interested in methods for weight loss, you should discuss this with your provider.     Consider reducing daily calorie intake by 500 calories.     Keep a food journal.     Avoiding skipping meals, consider cutting portions instead.    Diet combined with exercise helps maintain muscle while optimizing fat loss. Strength training is particularly important for building and maintaining muscle mass. Exercise helps reduce stress, increase energy, and improves fitness.  Increasing exercise without diet control, however, may not burn enough calories to loose weight.       Start walking three days a week 10-20 minutes at a time    Work towards walking thirty minutes five days a week     Eventually, increase the speed of your walking for 1-2 minutes at time    In addition, we recommend that you review healthy lifestyles and methods for weight loss available through the National Institutes of Health patient information sites:  http://win.niddk.nih.gov/publications/index.htm    And look into health and wellness programs that may be available through your health insurance provider, employer, local community center, or [employer].    Weight management plan: Patient was referred to their PCP to discuss a diet and exercise plan.

## 2021-05-14 ENCOUNTER — OFFICE VISIT (OUTPATIENT)
Dept: FAMILY MEDICINE | Facility: CLINIC | Age: 71
End: 2021-05-14
Payer: COMMERCIAL

## 2021-05-14 VITALS
DIASTOLIC BLOOD PRESSURE: 89 MMHG | BODY MASS INDEX: 38.62 KG/M2 | HEIGHT: 68 IN | OXYGEN SATURATION: 99 % | SYSTOLIC BLOOD PRESSURE: 149 MMHG | HEART RATE: 53 BPM | TEMPERATURE: 97.1 F | WEIGHT: 254.8 LBS | RESPIRATION RATE: 15 BRPM

## 2021-05-14 DIAGNOSIS — M25.512 LEFT SHOULDER PAIN, UNSPECIFIED CHRONICITY: Primary | ICD-10-CM

## 2021-05-14 RX ORDER — CYCLOBENZAPRINE HCL 5 MG
5-10 TABLET ORAL 3 TIMES DAILY PRN
Qty: 20 TABLET | Refills: 1 | Status: SHIPPED | OUTPATIENT
Start: 2021-05-14 | End: 2022-03-30

## 2021-05-14 ASSESSMENT — MIFFLIN-ST. JEOR: SCORE: 1895.14

## 2021-05-14 ASSESSMENT — PAIN SCALES - GENERAL: PAINLEVEL: SEVERE PAIN (7)

## 2021-05-14 NOTE — NURSING NOTE
"71 year old  Chief Complaint   Patient presents with     Back Pain     back pain 1 week        Blood pressure (!) 149/89, pulse 53, temperature 97.1  F (36.2  C), resp. rate 15, height 1.727 m (5' 7.99\"), weight 115.6 kg (254 lb 12.8 oz), SpO2 99 %. Body mass index is 38.75 kg/m .  Patient Active Problem List   Diagnosis     Hypertension     Obstructive sleep apnea     Medicare annual wellness visit, subsequent     Prediabetes     Left bundle branch block     Morbid obesity (H)     Bilateral sensorineural hearing loss       Wt Readings from Last 2 Encounters:   05/14/21 115.6 kg (254 lb 12.8 oz)   05/13/21 113.9 kg (251 lb)     BP Readings from Last 3 Encounters:   05/14/21 (!) 149/89   03/11/21 138/78   08/12/20 (!) 167/91         Current Outpatient Medications   Medication     hydrochlorothiazide (HYDRODIURIL) 25 MG tablet     ketoconazole (NIZORAL) 2 % external cream     lisinopril (ZESTRIL) 5 MG tablet     omeprazole (PRILOSEC OTC) 20 MG EC tablet     propranolol (INDERAL) 40 MG tablet     No current facility-administered medications for this visit.        Social History     Tobacco Use     Smoking status: Current Some Day Smoker     Years: 8.00     Types: Cigars     Start date: 6/15/2008     Smokeless tobacco: Never Used   Substance Use Topics     Alcohol use: Yes     Frequency: 4 or more times a week     Drinks per session: 1 or 2     Comment: 7     Drug use: No       Health Maintenance Due   Topic Date Due     DIABETIC FOOT EXAM  Never done     ADVANCE CARE PLANNING  Never done     ZOSTER IMMUNIZATION (2 of 3) 03/14/2016     MICROALBUMIN  12/19/2020     EYE EXAM  02/20/2021       No results found for: PAP      May 14, 2021 12:54 PM  "

## 2021-05-14 NOTE — PROGRESS NOTES
SUBJECTIVE:   Dev Martinez is a 71 year old male who presents to clinic today to discuss the following problem(s).    Pain in back for the past week  - happens intermittently; comes and goes   - this time started about a week ago, along rhomboid, deltoid and some pain down into his LEFT arm  - he is working from home and has noticed his vision is getting a little worse which means he is hunched over with neck craned to see the computer better  - thinks maybe a slight decrease in left  strength  - mostly he notices the pain when he tries to lie down to sleep at night     ROS: 10 point ROS neg other than the symptoms noted above in the HPI.    Today's PHQ-2:  PHQ-2 ( 1999 Pfizer) 3/11/2021 8/12/2020   Q1: Little interest or pleasure in doing things 0 0   Q2: Feeling down, depressed or hopeless 0 0   PHQ-2 Score 0 0       Past Medical History:   Diagnosis Date     Double vision      Gout      Hearing loss      Heartburn      Hypertension      Ringing in ears      Sleep difficulties      Snoring      Sore throat      Weight gain      No past surgical history on file.  Family History   Problem Relation Age of Onset     Pancreatic Cancer Mother 80     Diabetes Father      Heart Disease Father      Social History     Tobacco Use     Smoking status: Current Some Day Smoker     Years: 8.00     Types: Cigars     Start date: 6/15/2008     Smokeless tobacco: Never Used   Substance Use Topics     Alcohol use: Yes     Frequency: 4 or more times a week     Drinks per session: 1 or 2     Comment: 7     Drug use: No     Social History     Social History Narrative     Not on file       Current Outpatient Medications   Medication     cyclobenzaprine (FLEXERIL) 5 MG tablet     hydrochlorothiazide (HYDRODIURIL) 25 MG tablet     ketoconazole (NIZORAL) 2 % external cream     lisinopril (ZESTRIL) 5 MG tablet     omeprazole (PRILOSEC OTC) 20 MG EC tablet     propranolol (INDERAL) 40 MG tablet     No current facility-administered  "medications for this visit.      I have reviewed the patient's past medical, surgical, family, and social history.     OBJECTIVE:   BP (!) 149/89   Pulse 53   Temp 97.1  F (36.2  C)   Resp 15   Ht 1.727 m (5' 7.99\")   Wt 115.6 kg (254 lb 12.8 oz)   SpO2 99%   BMI 38.75 kg/m      Constitutional: well-appearing, appears stated age  Eyes: conjunctivae without erythema, sclera anicteric.   Cardiac: regular rate and rhythm, normal S1/S2, no murmur/rubs/gallops  Respiratory: lungs clear to auscultation bilaterally, normal work of breathing, no wheezes/crackles  MSK: LEFT upper back and shoulder  - no gross malformation or skin changes  - moderate tenderness to palpation along LEFT medial rhomboid   - full AROM without pain with forward flexion and ABDuction at shoulders bilaterally  - pain with resistance to external rotation on LEFT  - negative Tang  - negative for scapular winging  - bicep strength intact  - distal LUE strength and sensation grossly intact  Skin: no rashes, lesions, or wounds  Psych: affect is full and appropriate, speech is fluent and non-pressured    ASSESSMENT AND PLAN:     Dev was seen today for back pain.    Diagnoses and all orders for this visit:    Left shoulder pain, unspecified chronicity  -     PHYSICAL THERAPY REFERRAL; Future  -     cyclobenzaprine (FLEXERIL) 5 MG tablet; Take 1-2 tablets (5-10 mg) by mouth 3 times daily as needed for muscle spasms    Suspect muscle spasm/strain with poor usage at computer for long hours with possible pinched nerve. Flexeril as noted above to help with acute symptom relief and PT to address body usage issues.       Jj Ley MD  [facility]  05/14/2021, 11:07 AM    "

## 2021-05-17 ENCOUNTER — ANCILLARY PROCEDURE (OUTPATIENT)
Dept: GENERAL RADIOLOGY | Facility: CLINIC | Age: 71
End: 2021-05-17
Attending: FAMILY MEDICINE
Payer: COMMERCIAL

## 2021-05-17 ENCOUNTER — OFFICE VISIT (OUTPATIENT)
Dept: FAMILY MEDICINE | Facility: CLINIC | Age: 71
End: 2021-05-17
Payer: COMMERCIAL

## 2021-05-17 VITALS
DIASTOLIC BLOOD PRESSURE: 84 MMHG | TEMPERATURE: 97.3 F | WEIGHT: 252.5 LBS | SYSTOLIC BLOOD PRESSURE: 131 MMHG | RESPIRATION RATE: 15 BRPM | OXYGEN SATURATION: 98 % | BODY MASS INDEX: 38.27 KG/M2 | HEIGHT: 68 IN | HEART RATE: 64 BPM

## 2021-05-17 DIAGNOSIS — M25.512 LEFT SHOULDER PAIN, UNSPECIFIED CHRONICITY: Primary | ICD-10-CM

## 2021-05-17 DIAGNOSIS — M25.512 LEFT SHOULDER PAIN, UNSPECIFIED CHRONICITY: ICD-10-CM

## 2021-05-17 DIAGNOSIS — K21.9 GASTROESOPHAGEAL REFLUX DISEASE WITHOUT ESOPHAGITIS: Primary | ICD-10-CM

## 2021-05-17 PROCEDURE — 72040 X-RAY EXAM NECK SPINE 2-3 VW: CPT | Mod: FY | Performed by: RADIOLOGY

## 2021-05-17 RX ORDER — OMEPRAZOLE 20 MG/1
20 TABLET, DELAYED RELEASE ORAL DAILY
Qty: 90 TABLET | Refills: 3 | Status: CANCELLED | OUTPATIENT
Start: 2021-05-17

## 2021-05-17 RX ORDER — MELOXICAM 7.5 MG/1
7.5 TABLET ORAL 2 TIMES DAILY PRN
Qty: 30 TABLET | Refills: 0 | Status: SHIPPED | OUTPATIENT
Start: 2021-05-17 | End: 2021-07-26

## 2021-05-17 ASSESSMENT — MIFFLIN-ST. JEOR: SCORE: 1884.83

## 2021-05-17 NOTE — PROGRESS NOTES
SUBJECTIVE:   Dev Martinez is a 71 year old male who presents to clinic today to discuss the following problem(s).    Left rhomboid and shoulder pain  - seen previously for this on 5/14  - suspected muscle strain, prescribed flexeril and PT  - thinks the flexeril is helping during the day but at night, the pain in his shoulder will wake him up and not let him sleep  - typically, he sleeps on his side, lately he has tried to sleep on his back to help with pain; it doesn't seem to make a difference  - thinks he has only been getting about an hour of sleep per night for the past few days  - very tired and having difficulty focusing  - hasn't heard from PT yet to schedule that  - was previously trying ibuprofen multiple times per day but that was upsetting his stomach  - hasn't really tried topical analgesic  - feels some pain radiating down his arm, most notably at the ulnar sylus  - no notable weakness in arm or  strength     ROS: 10 point ROS neg other than the symptoms noted above in the HPI.      Today's PHQ-2:  PHQ-2 ( 1999 Pfizer) 5/17/2021 3/11/2021   Q1: Little interest or pleasure in doing things 0 0   Q2: Feeling down, depressed or hopeless 1 0   PHQ-2 Score 1 0       Past Medical History:   Diagnosis Date     Double vision      Gout      Hearing loss      Heartburn      Hypertension      Ringing in ears      Sleep difficulties      Snoring      Sore throat      Weight gain      No past surgical history on file.  Family History   Problem Relation Age of Onset     Pancreatic Cancer Mother 80     Diabetes Father      Heart Disease Father      Social History     Tobacco Use     Smoking status: Current Some Day Smoker     Years: 8.00     Types: Cigars     Start date: 6/15/2008     Smokeless tobacco: Never Used   Substance Use Topics     Alcohol use: Yes     Frequency: 4 or more times a week     Drinks per session: 1 or 2     Comment: 7     Drug use: No     Social History     Social History Narrative     Not  "on file       Current Outpatient Medications   Medication     cyclobenzaprine (FLEXERIL) 5 MG tablet     diclofenac (VOLTAREN) 1 % topical gel     hydrochlorothiazide (HYDRODIURIL) 25 MG tablet     ketoconazole (NIZORAL) 2 % external cream     lisinopril (ZESTRIL) 5 MG tablet     meloxicam (MOBIC) 7.5 MG tablet     omeprazole (PRILOSEC OTC) 20 MG EC tablet     propranolol (INDERAL) 40 MG tablet     No current facility-administered medications for this visit.      I have reviewed the patient's past medical, surgical, family, and social history.     OBJECTIVE:   /84 (BP Location: Right arm, Patient Position: Sitting, Cuff Size: Adult Large)   Pulse 64   Temp 97.3  F (36.3  C) (Skin)   Resp 15   Ht 1.727 m (5' 8\")   Wt 114.5 kg (252 lb 8 oz)   SpO2 98%   BMI 38.39 kg/m      Constitutional: well-appearing, appears stated age  Eyes: conjunctivae without erythema, sclera anicteric.   Cardiac: regular rate and rhythm, normal S1/S2, no murmur/rubs/gallops  Respiratory: lungs clear to auscultation bilaterally, normal work of breathing, no wheezes/crackles  MSK: compared to previous exam  LEFT upper back and shoulder  - no gross malformation or skin changes  - decreased tenderness to palpation along LEFT medial rhomboid   - full AROM without pain with forward flexion and ABDuction at shoulders bilaterally  - continued pain with resistance to external rotation on LEFT  - negative Tang  - negative for scapular winging  - bicep strength intact  - distal LUE strength and sensation grossly intact  Skin: no rashes, lesions, or wounds  Psych: affect is full and appropriate, speech is fluent and non-pressured    ASSESSMENT AND PLAN:     Dev was seen today for back pain.    Diagnoses and all orders for this visit:    Left shoulder pain, unspecified chronicity  -     diclofenac (VOLTAREN) 1 % topical gel; Apply 4 g topically 4 times daily  -     meloxicam (MOBIC) 7.5 MG tablet; Take 1 tablet (7.5 mg) by mouth 2 times " daily as needed for pain  -     Orthopedic & Spine  Referral; Future  -     XR Cervical Spine 2/3 Views; Future    Agreed to imaging today. Will also write for extended release NSAID to try and address GI issues with more frequent dosing. Encouraged patient to take meds with food. Will also try a topical NSAID as noted above. Gave contact information for PT to discuss strategies to address pain. As noted previously, I have suspicions that pain is to some degree related to poor body usage in the context of bad vision and sitting, squinting with a craned neck, in front of a monitor for prolonged hours. But, I also have some concerns for possible rotator cuff tendonitis. Will refer to sports medicine as well at this point for further consideration of this.       Jj Ley MD  [facility]  05/17/2021, 2:28 PM

## 2021-05-17 NOTE — PROGRESS NOTES
AUDIOLOGY REPORT    SUBJECTIVE:  Dev Martinez is a 71 year old male who was seen on 5/26/21 in the Audiology Clinic at the [facility] for audiologic evaluation, referred by Behzad Pisano MD.  The patient has been seen previously in this clinic on 5/7/15 for assessment and results indicated normal hearing sloping to mild to moderate sensorineural hearing loss in the left ear and normal hearing sloping to moderately severe sensorineural hearing loss in the right ear.  The hearing loss was asymmetrical (right ear worse than left).  Hearing loss was medically evaluated by Robert Dong MD in ENT Clinic in 2015.  MRI did not find a cause for the asymmetry.  Today, the patient reports concerns about a gradual decrease in hearing bilaterally.  He continues to have bilateral tinnitus.  The patient denies dizziness, ear pain, aural fullness or drainage from the ears.  He has never had ear surgery.  He reports a history of noise exposure (airplanes,  at [employer]).     OBJECTIVE:    Fall Risk Screen:  1. Have you fallen two or more times in the past year? No  2. Have you fallen and had an injury in the past year? No    Timed Up and Go Score (in seconds): not tested  Is patient a fall risk? No  Referral initiated: No  Fall Risk Assessment Completed by Audiology    Otoscopic exam indicates ears are clear of cerumen bilaterally.     Pure Tone Thresholds assessed using conventional audiometry with good reliability from 250-8000 Hz bilaterally using circumaural headphones and rechecked with insert earphones.     RIGHT: Borderline normal hearing sloping to mild to moderately severe sensorineural hearing loss at 2000 Hz and above.      LEFT: Mild sensorineural loss at 250 Hz rising to borderline normal then sloping to mild to moderately severe sensorineural hearing loss at 1500 Hz and above.   Asymmetry of 15 dB at 2000 Hz, where left ear is poorer than right     Tympanogram:     RIGHT: normal eardrum mobility    LEFT:   normal eardrum mobility    Reflexes (reported by stimulus ear):  RIGHT: Ipsilateral is present at normal levels  RIGHT: Contralateral is present at normal levels  LEFT:   Ipsilateral is present at normal levels  LEFT:   Contralateral is present at normal levels      Speech Reception Threshold:    RIGHT: 25 dB HL    LEFT:   25 dB HL  Word Recognition Score:     RIGHT: 100% at 75 dB HL using NU-6 recorded word list.    LEFT:   100% at 75 dB HL using NU-6 recorded word list.      ASSESSMENT:   Sensorineural hearing loss bilaterally     Compared to patient's previous audiogram dated 5/7/15, hearing has improved 10 dB at 250 and 6000 Hz in the right ear.  In the left ear, hearing has decreased 10-15 dB at 250 Hz and from 4826-3666 Hz.  The right ear is no longer poorer than the left, as it was on 5/7/15.  Today the left ear is poorer than the right by 15 dB at 2000 Hz.      Today s results were discussed with the patient in detail.     PLAN:    1. Patient will return to see Dr. Dong in ENT due to the hearing changes and to determine if the patient has medical clearance for hearing aids.  2. If the patient is medically cleared for hearing aids, and if he is interested in a trial with amplification, he will schedule a hearing aid consultation.   3. Continue to monitor hearing every 1-2 years or sooner if changes.   4. Follow up with Dr. Pisano.     The patient expressed understanding and agreement with this plan.    Cheryle Elder, CCC-A, [facility]  Licensed Audiologist  [place] #[id number]    Enclosure: audiogram    Cc Behzad Pisano MD   Cc Robert Dong MD

## 2021-05-17 NOTE — NURSING NOTE
"71 year old  Chief Complaint   Patient presents with     Back Pain     pain in left shoulder/back, x 5 days, unable to sleep        Blood pressure 131/84, pulse 64, temperature 97.3  F (36.3  C), temperature source Skin, resp. rate 15, height 1.727 m (5' 8\"), weight 114.5 kg (252 lb 8 oz), SpO2 98 %. Body mass index is 38.39 kg/m .  Patient Active Problem List   Diagnosis     Hypertension     Obstructive sleep apnea     Medicare annual wellness visit, subsequent     Prediabetes     Left bundle branch block     Morbid obesity (H)     Bilateral sensorineural hearing loss       Wt Readings from Last 2 Encounters:   05/17/21 114.5 kg (252 lb 8 oz)   05/14/21 115.6 kg (254 lb 12.8 oz)     BP Readings from Last 3 Encounters:   05/17/21 131/84   05/14/21 (!) 149/89   03/11/21 138/78         Current Outpatient Medications   Medication     cyclobenzaprine (FLEXERIL) 5 MG tablet     hydrochlorothiazide (HYDRODIURIL) 25 MG tablet     ketoconazole (NIZORAL) 2 % external cream     lisinopril (ZESTRIL) 5 MG tablet     omeprazole (PRILOSEC OTC) 20 MG EC tablet     propranolol (INDERAL) 40 MG tablet     No current facility-administered medications for this visit.        Social History     Tobacco Use     Smoking status: Current Some Day Smoker     Years: 8.00     Types: Cigars     Start date: 6/15/2008     Smokeless tobacco: Never Used   Substance Use Topics     Alcohol use: Yes     Frequency: 4 or more times a week     Drinks per session: 1 or 2     Comment: 7     Drug use: No       Health Maintenance Due   Topic Date Due     DIABETIC FOOT EXAM  Never done     ADVANCE CARE PLANNING  Never done     ZOSTER IMMUNIZATION (2 of 3) 03/14/2016     MICROALBUMIN  12/19/2020     EYE EXAM  02/20/2021       No results found for: PAP      May 17, 2021 2:11 PM    "

## 2021-05-17 NOTE — TELEPHONE ENCOUNTER
Last time prescribed: 5/22/20 , 90 tabs/caps x 3 refills  Last office visit: 5/17/21  Next appointment: No Future Appointment Scheduled    Called pt, he has always been on this med , and takes the capsules, not tablets.     Prescription approved per [facility] Refill Protocol.    Routing refill request to provider for review/approval because:  Drug interaction warning    Note topical ketoconazole     Lesli Estrada RN  May 18, 2021 2:40 PM

## 2021-05-20 ENCOUNTER — THERAPY VISIT (OUTPATIENT)
Dept: PHYSICAL THERAPY | Facility: CLINIC | Age: 71
End: 2021-05-20
Attending: FAMILY MEDICINE
Payer: COMMERCIAL

## 2021-05-20 DIAGNOSIS — M54.6 ACUTE LEFT-SIDED THORACIC BACK PAIN: ICD-10-CM

## 2021-05-20 DIAGNOSIS — M89.8X1 PAIN OF LEFT SCAPULA: ICD-10-CM

## 2021-05-20 DIAGNOSIS — M25.512 LEFT SHOULDER PAIN, UNSPECIFIED CHRONICITY: ICD-10-CM

## 2021-05-20 DIAGNOSIS — M54.2 CERVICALGIA: ICD-10-CM

## 2021-05-20 PROCEDURE — 97110 THERAPEUTIC EXERCISES: CPT | Mod: GP | Performed by: PHYSICAL THERAPIST

## 2021-05-20 PROCEDURE — 97140 MANUAL THERAPY 1/> REGIONS: CPT | Mod: GP | Performed by: PHYSICAL THERAPIST

## 2021-05-20 PROCEDURE — 97161 PT EVAL LOW COMPLEX 20 MIN: CPT | Mod: GP | Performed by: PHYSICAL THERAPIST

## 2021-05-20 NOTE — PROGRESS NOTES
[facility] Initial Evaluation    Subjective:  Dev Martinez is a 71 year old male.    Patient s chief complaints: L scapular and L elbow to ulnar forearm pain  Condition occurred due to no specific cause.  Date of Onset: about 5/13/21  Location of symptoms is L scapula and ulnar forearm.  Symptoms other than pain include: denies tingling/numbness, denies weakness-but is unsure (had some difficulty opening a can of cat foot x 1)   Quality of pain is dull/sharp/burning and frequency is intermittent-but at night once it's there it stays.    Pain dependence on time of day is: worse at night.   Pain rating is: 5/10.    Symptoms are exacerbated by: sleep/laying down on side (sleeping on R side), some discomfort with driving  Symptoms are relieved by:  Nothing specific.    Progression of symptoms is that symptoms are:  unchanged.    Imaging/Special tests include:   XR CERVICAL SPINE TWO-THREE VIEWS 5/17/2021 4:09 PM   HISTORY: Left shoulder pain, unspecified chronicity.  COMPARISON: None.     IMPRESSION: Straightening of the normal cervical lordosis. Minimal  rightward curvature of the craniocervical junction and leftward  curvature at the cervicothoracic junction. Normal vertebral body  heights. Moderate disc space narrowing at C6-C7 with small multilevel  marginal anterior endplate osteophytes. Minimal disc space narrowing  elsewhere. No definite radiographic findings of advanced facet  arthropathy. The prevertebral soft tissues appear within normal  limits.    Previous treatments include: chiropractic.   Patient reports that general health is: good.     Pertinent medical history includes:  .    Past Medical History:   Diagnosis Date     Double vision      Gout      Hearing loss      Heartburn      Hypertension      Ringing in ears      Sleep difficulties      Snoring      Sore throat      Weight gain      Medical allergies includes:    Patient has no known allergies.  Surgical history includes:    No past surgical history on file.  Current medications include:     Current Outpatient Medications:      cyclobenzaprine (FLEXERIL) 5 MG tablet, Take 1-2 tablets (5-10 mg) by mouth 3 times daily as needed for muscle spasms, Disp: 20 tablet, Rfl: 1     diclofenac (VOLTAREN) 1 % topical gel, Apply 4 g topically 4 times daily, Disp: 100 g, Rfl: 0     hydrochlorothiazide (HYDRODIURIL) 25 MG tablet, Take 1 tablet (25 mg) by mouth daily, Disp: 90 tablet, Rfl: 4     ketoconazole (NIZORAL) 2 % external cream, Apply topically 2 times daily, Disp: 30 g, Rfl: 0     lisinopril (ZESTRIL) 5 MG tablet, Take 1 tablet (5 mg) by mouth daily, Disp: 90 tablet, Rfl: 4     meloxicam (MOBIC) 7.5 MG tablet, Take 1 tablet (7.5 mg) by mouth 2 times daily as needed for pain, Disp: 30 tablet, Rfl: 0     omeprazole (PRILOSEC) 20 MG DR capsule, Take 1 capsule (20 mg) by mouth daily, Disp: 90 capsule, Rfl: 3     propranolol (INDERAL) 40 MG tablet, Take one-half tab in the AM and one-half tab in the PM., Disp: 90 tablet, Rfl: 4    Current occupation is: IT  Work status is: working from home  Primary job tasks include: computer work  Barriers include: none    Red flags include: none    Patient's expectations for therapy include: be able to sleep at night    CERVICAL:    Posture: fair  Posture Correction: no effect    Neurological:    Motor Deficit:  Myotomes L R   C4 (shoulder elevation) 5 5   C5 (shoulder abduction) 5 5   C6 (elbow flexion) 5 5   C7 (elbow extension) 5 5   C8 (thumb extension) 5 5   T1 (finger add/abd) 5 5    Strength (lb) WNL WNL     Sensory Deficit, Reflexes, Dural Signs: intact light touch screen B UE dermatomes    AROM: (Major, Moderate, Minimal or Nil loss)  Movement Loss Maninder Mod Min Nil Pain   Protrusion    X No increase in symptoms   Flexion    X No increase in symptoms   Retraction    X No increase in symptoms   Extension   X  No increase in symptoms   Left Rotation   X X No increase in symptoms   Right Rotation   X  X No increase in symptoms   Left Side Bending   X X No increase in symptoms   Right Side bending   X X No increase in symptoms     Repeated movement testing:   (During: produces, abolishes, increases, decreases, no effect, centralizing, peripheralizing; After: better, worse, no better, no worse, no effect, centralized, peripheralized)    Pre-test Symptoms Sitting: L scapular pain   Symptoms During Symptoms After ROM increased ROM decreased No Effect   PRO        Rep PRO        RET        Rep RET        RET EXT        Rep RET EXT No effect No effect   X       Provisional Classification: mechanically inconclusive  Principle of Management: will initiate seated extension x 10 reps every few hours.  Not much on testing, but due to intermittent symptoms to ulnar forearm, will include cervical based exercises related to that dermatome.  Trial extension and monitor effect.      SHOULDER:    Shoulder:   PROM L PROM R AROM L AROM R MMT L MMT R   Flex   WNL WNL 5 5   Abd   WNL WNL 5 5   Full Can     5 5   Empty Can     5 5   IR   WNL WNL 5 5   ER   WNL WNL 5 5   Ext/IR   WNL WNL       Resisted pull down and row type movement, manually, and this does not elicit pain either.    Scapulothoraic Rhythm: symmetric    Palpation: pain localizes to L rhomboid area/middle/lower trap.  Some increased muscular tone/tightness in this area between the L scapula and spine.    Assessment/Plan:    Patient is a 71 year old male with cervical, thoracic and left side shoulder complaints.    Patient has the following significant findings with corresponding treatment plan.                Diagnosis 1:  L scapular pain, with intermittent ulnar forearm symptoms (appears most consistent with rhomboid/trapezius strain, though with intermittent ulnar symptoms cannot rule out lower cervical/upper thoracic radicular involvement, will work on both areas)    Pain -  hot/cold therapy, US, electric stimulation, manual therapy and directional preference  exercise  Decreased ROM/flexibility - manual therapy and therapeutic exercise  Decreased strength - therapeutic exercise and therapeutic activities  Impaired balance - neuro re-education and therapeutic activities  Decreased proprioception - neuro re-education and therapeutic activities  Edema - electric stimulation and cold therapy  Impaired gait - gait training  Decreased function - therapeutic activities  Impaired posture - neuro re-education    Therapy Evaluation Codes:   1) History comprised of:   Personal factors that impact the plan of care:      None.    Comorbidity factors that impact the plan of care are:      None.     Medications impacting care: None.  2) Examination of Body Systems comprised of:   Body structures and functions that impact the plan of care:      Cervical spine, Shoulder and Thoracic Spine.   Activity limitations that impact the plan of care are:      Sleeping and Laying down.  3) Clinical presentation characteristics are:   Stable/Uncomplicated.  4) Decision-Making    Low complexity using standardized patient assessment instrument and/or measureable assessment of functional outcome.  Cumulative Therapy Evaluation is: Low complexity.    Previous and current functional limitations:  (See Goal Flow Sheet for this information)    Short term and Long term goals: (See Goal Flow Sheet for this information)     Communication ability:  Patient appears to be able to clearly communicate and understand verbal and written communication and follow directions correctly.  Treatment Explanation - The following has been discussed with the patient:   RX ordered/plan of care  Anticipated outcomes  Possible risks and side effects  This patient would benefit from PT intervention to resume normal activities.   Rehab potential is good.    Frequency:  1 X week, once daily  Duration:  for 8 weeks  Discharge Plan:  Achieve all LTG.  Independent in home treatment program.  Reach maximal therapeutic  benefit.    Please refer to the daily flowsheet for treatment today, total treatment time and time spent performing 1:1 timed codes.

## 2021-05-21 PROBLEM — M54.2 CERVICALGIA: Status: ACTIVE | Noted: 2021-05-21

## 2021-05-21 PROBLEM — M54.6 ACUTE LEFT-SIDED THORACIC BACK PAIN: Status: ACTIVE | Noted: 2021-05-21

## 2021-05-21 PROBLEM — M89.8X1 PAIN OF LEFT SCAPULA: Status: ACTIVE | Noted: 2021-05-21

## 2021-05-26 ENCOUNTER — OFFICE VISIT (OUTPATIENT)
Dept: AUDIOLOGY | Facility: CLINIC | Age: 71
End: 2021-05-26
Attending: FAMILY MEDICINE
Payer: COMMERCIAL

## 2021-05-26 ENCOUNTER — MYC MEDICAL ADVICE (OUTPATIENT)
Dept: FAMILY MEDICINE | Facility: CLINIC | Age: 71
End: 2021-05-26

## 2021-05-26 DIAGNOSIS — H90.3 BILATERAL SENSORINEURAL HEARING LOSS: ICD-10-CM

## 2021-05-26 PROCEDURE — 92550 TYMPANOMETRY & REFLEX THRESH: CPT | Performed by: AUDIOLOGIST-HEARING AID FITTER

## 2021-05-26 PROCEDURE — 92557 COMPREHENSIVE HEARING TEST: CPT | Performed by: AUDIOLOGIST-HEARING AID FITTER

## 2021-05-26 NOTE — LETTER
May 27, 2021    RE: Dev Martinez  [address] [place] [address]            To Whom It May Concern,      I am writing this letter to confirm that I examined Mr. Dev Martinez in clinic on 5/17/2021. At that point, Dev was experiencing significant upper back and left shoulder pain. Based on his reported symptoms and my examination I have recommended patient undergo various treatments including physical therapy and therapeutic massage to alleviate his symptoms. Please feel free to contact this clinic with any further questions.             Jj Santiago MD  2:37 PM, May 27, 2021

## 2021-05-26 NOTE — TELEPHONE ENCOUNTER
FUTURE VISIT INFORMATION      FUTURE VISIT INFORMATION:    Date: 6/14/21    Time: 8:30 AM    Location: [facility]  REFERRAL INFORMATION:    Referring provider:  Cong Velazquez    Referring providers clinic:  [facility] - Audiology    Reason for visit/diagnosis: Reassess Hearing Loss & HA Clearance    RECORDS REQUESTED FROM:       Clinic name Comments Records Status Imaging Status   [facility] 5/26/21 - AUD OV with Cong Velazquez  6/15/15 - ENT OV with Dr. Dong  5/7/15 - AUD OV with Cong [facility] 3/11/21 - PCC OV with Dr. Pisano [facility]    [facility] - Procedure 5/26/21 - Audiogram  5/7/15 - Audiogram [facility]    [facility] - Imaging 7/9/15 - [facility] Brain [facility]   [facility] 7/27/16 - [facility] OV with [facility] Everywhere

## 2021-06-02 DIAGNOSIS — M25.512 LEFT SHOULDER PAIN, UNSPECIFIED CHRONICITY: ICD-10-CM

## 2021-06-02 RX ORDER — MELOXICAM 7.5 MG/1
7.5 TABLET ORAL 2 TIMES DAILY PRN
Qty: 30 TABLET | Refills: 0 | Status: CANCELLED | OUTPATIENT
Start: 2021-06-02

## 2021-06-02 NOTE — TELEPHONE ENCOUNTER
Last office visit was on 05/17/2021, no future visits scheduled.  Sending [employer] message to pt to see how well meloxicam working for pt  - first prescribed 5/17/21, by Dr Ley.   Ewelina Crandall RN  [facility]    Spoke to pt - he does not need meloxicam refilled.   LM with pharmacy to discontinue med for now.  Ewelina Crandall RN  [facility]

## 2021-06-03 ENCOUNTER — OFFICE VISIT (OUTPATIENT)
Dept: ORTHOPEDICS | Facility: CLINIC | Age: 71
End: 2021-06-03
Attending: FAMILY MEDICINE
Payer: COMMERCIAL

## 2021-06-03 VITALS
DIASTOLIC BLOOD PRESSURE: 78 MMHG | BODY MASS INDEX: 38.62 KG/M2 | SYSTOLIC BLOOD PRESSURE: 124 MMHG | WEIGHT: 254.8 LBS | HEIGHT: 68 IN

## 2021-06-03 DIAGNOSIS — R73.03 PREDIABETES: ICD-10-CM

## 2021-06-03 DIAGNOSIS — M25.512 TRIGGER POINT OF SHOULDER REGION, LEFT: ICD-10-CM

## 2021-06-03 DIAGNOSIS — M89.8X1 PAIN OF LEFT SCAPULA: Primary | ICD-10-CM

## 2021-06-03 LAB — HBA1C MFR BLD: 6.3 % (ref 4.1–5.7)

## 2021-06-03 PROCEDURE — 99214 OFFICE O/P EST MOD 30 MIN: CPT | Performed by: FAMILY MEDICINE

## 2021-06-03 ASSESSMENT — MIFFLIN-ST. JEOR: SCORE: 1895.27

## 2021-06-03 NOTE — PATIENT INSTRUCTIONS
Voltaren 1% gel to shoulder up to four times daily  Tennis ball or massage stick  Heat/ice  Consider trigger point injections if worsens  Continue home exercises/PT  Muscle relaxant prescription if worsens    Icy hot or salon pas with lidocaine 4%

## 2021-06-03 NOTE — PROGRESS NOTES
CHIEF COMPLAINT:  Pain of the Left Shoulder and Pain of the Middle Back       HISTORY OF PRESENT ILLNESS  Mr. Martinez is a pleasant 71 year old year old male who presents to clinic today with left sided middle back pain.  Dev explains that his symptoms started with trouble sleeping, he would wake up with burning pain. He has been to 2 chiropractors. He saw  who referred him for physical therapy. He did 1 session of physical therapy, who suggested stretches.   Feels like a knot in his muscle. Has had similar problem in the swetha on both sides of his back, left has always been a bit worse than right.     Onset: gradual, worsening, intermittent  Location: Left sided upper back and left posterior shoulder, non radiating  Quality:  aching, dull and burning  Duration: acute on chronic - 3-4 weeks of recent symptoms, chronic intermittent symptoms over the past 15-30 years  Severity: 10/10 at worst  Timing: intermittent episodes - worse with laying down flat.   Modifying factors: worse with sleeping  Associated signs & symptoms: burning  Previous similar pain: Yes - occasional symptoms every couple of years, will self treat with massage.   Treatments to date: has been doing physical therapy, Flexeril, Mobic, massage with tennis ball, Voltaren gel     Additional history: No pain of his shoulder with movement of his neck.  No activities make it better or worse.    Review of Systems:    Have you recently had a a fever, chills, weight loss? No    Do you have any vision problems? No    Do you have any chest pain or edema? No    Do you have any shortness of breath or wheezing?  No    Do you have stomach problems? No    Do you have any numbness or focal weakness? No    Do you have diabetes? Pre-Diabetic - A1C was 6.2 today, working on losing weight    Do you have problems with bleeding or clotting? No    Do you have an rashes or other skin lesions? No    MEDICAL HISTORY  Patient Active Problem List   Diagnosis      Hypertension     Obstructive sleep apnea     Medicare annual wellness visit, subsequent     Prediabetes     Left bundle branch block     Morbid obesity (H)     Bilateral sensorineural hearing loss     Pain of left scapula     Acute left-sided thoracic back pain     Cervicalgia       Current Outpatient Medications   Medication Sig Dispense Refill     cyclobenzaprine (FLEXERIL) 5 MG tablet Take 1-2 tablets (5-10 mg) by mouth 3 times daily as needed for muscle spasms 20 tablet 1     diclofenac (VOLTAREN) 1 % topical gel Apply 4 g topically 4 times daily 100 g 0     hydrochlorothiazide (HYDRODIURIL) 25 MG tablet Take 1 tablet (25 mg) by mouth daily 90 tablet 4     ketoconazole (NIZORAL) 2 % external cream Apply topically 2 times daily 30 g 0     lisinopril (ZESTRIL) 5 MG tablet Take 1 tablet (5 mg) by mouth daily 90 tablet 4     meloxicam (MOBIC) 7.5 MG tablet Take 1 tablet (7.5 mg) by mouth 2 times daily as needed for pain 30 tablet 0     omeprazole (PRILOSEC) 20 MG DR capsule Take 1 capsule (20 mg) by mouth daily 90 capsule 3     propranolol (INDERAL) 40 MG tablet Take one-half tab in the AM and one-half tab in the PM. 90 tablet 4       No Known Allergies    Family History   Problem Relation Age of Onset     Pancreatic Cancer Mother 80     Diabetes Father      Heart Disease Father        Additional medical/Social/Surgical histories reviewed in [facility] and updated as appropriate.       PHYSICAL EXAM  Wt 115.6 kg (254 lb 12.8 oz)   BMI 38.74 kg/m      General  - normal appearance, in no obvious distress  Musculoskeletal - cervical spine  - inspection: normal bone and joint alignment, no obvious kyphosis  - palpation: no paravertebral or bony tenderness  - ROM: normal and painless flexion, extension, left and right sidebending and rotation  - strength: upper extremities 5/5 in all planes  - special tests:  (-) Spurling bilaterally  Musculoskeletal - Left shoulder  - inspection: normal bone and joint alignment, no obvious  deformity, no scapular winging, no AC step-off  - palpation: Tenderness to palpation at left periscapular region, mid rhomboid with hypertonicity and mild tenderness.  - ROM: Full range of motion of shoulder without provoking rhomboid.  - strength: 5/5  strength, 5/5 in all shoulder planes  Neuro  - no sensory or motor deficit, grossly normal coordination, normal muscle tone  Skin  - no ecchymosis, erythema, warmth, or induration, no obvious rash  Psych  - interactive, appropriate, normal mood and affect    IMAGING :  XR CERVICAL SPINE TWO-THREE VIEWS 5/17/2021 4:09 PM      HISTORY: Left shoulder pain, unspecified chronicity.     COMPARISON: None.                                                                      IMPRESSION: Straightening of the normal cervical lordosis. Minimal  rightward curvature of the craniocervical junction and leftward  curvature at the cervicothoracic junction. Normal vertebral body  heights. Moderate disc space narrowing at C6-C7 with small multilevel  marginal anterior endplate osteophytes. Minimal disc space narrowing  elsewhere. No definite radiographic findings of advanced facet  arthropathy. The prevertebral soft tissues appear within normal  limits.     TREVOR MOODY MD    Cervical x-ray reviewed.  Extensive review of PT notes, review of recent family practice visit with Dr. Ley.    ASSESSMENT & PLAN  Mr. Martinez is a 71 year old year old male who presents to clinic today with acute left-sided periscapular pain.  Clinical history as well as examination today suggestive of left rhomboid trigger point/strain.  There is still a possibility is suffering from radicular pain from cervical or thoracic spine however this seems less likely without provocation with spinal movement.    Diagnosis: Pain of left scapula, trigger point of left rhomboid    Dev states that he is about 90% improved after starting formal physical therapy.  We discussed additional treatment measures that are  available to him including use of over-the-counter lidocaine patches, heat, massage such as tennis ball.  He can continue his prescription for Flexeril nightly     Thera cane massager ordered which is coming this week which I think will be helpful, additionally he may use a tennis ball.       I discussed consideration for trigger point injection if pain does not improve.  I would also like him to keep a symptom diary to determine whether cervical motion begins to cause any radiating pain to the left shoulder.  If this does occur, I would order MRI of cervical spine.    It was a pleasure seeing Dev today.    Norm Jiménez DO, [facility]  Primary Care Sports Medicine    38 minutes on date of the encounter doing chart review, history and examination, independent imaging review, documentation, and additional activities noted above.

## 2021-06-03 NOTE — LETTER
6/3/2021         RE: Dev Martinez  [address]  [facility] [address]        Dear Colleague,    Thank you for referring your patient, Dev Martinez, to the [facility] SPORTS [facility]IC[facility]IN[facility] [place]. Please see a copy of my visit note below.    CHIEF COMPLAINT:  Pain of the Left Shoulder and Pain of the Middle Back       HISTORY OF PRESENT ILLNESS  Mr. Martinez is a pleasant 71 year old year old male who presents to clinic today with left sided middle back pain.  Dev explains that his symptoms started with trouble sleeping, he would wake up with burning pain. He has been to 2 chiropractors. He saw  who referred him for physical therapy. He did 1 session of physical therapy, who suggested stretches.   Feels like a knot in his muscle. Has had similar problem in the swetha on both sides of his back, left has always been a bit worse than right.     Onset: gradual, worsening, intermittent  Location: Left sided upper back and left posterior shoulder, non radiating  Quality:  aching, dull and burning  Duration: acute on chronic - 3-4 weeks of recent symptoms, chronic intermittent symptoms over the past 15-30 years  Severity: 10/10 at worst  Timing: intermittent episodes - worse with laying down flat.   Modifying factors: worse with sleeping  Associated signs & symptoms: burning  Previous similar pain: Yes - occasional symptoms every couple of years, will self treat with massage.   Treatments to date: has been doing physical therapy, Flexeril, Mobic, massage with tennis ball, Voltaren gel     Additional history: No pain of his shoulder with movement of his neck.  No activities make it better or worse.    Review of Systems:    Have you recently had a a fever, chills, weight loss? No    Do you have any vision problems? No    Do you have any chest pain or edema? No    Do you have any shortness of breath or wheezing?  No    Do you have stomach problems? No    Do you have any numbness or focal weakness?  No    Do you have diabetes? Pre-Diabetic - A1C was 6.2 today, working on losing weight    Do you have problems with bleeding or clotting? No    Do you have an rashes or other skin lesions? No    MEDICAL HISTORY  Patient Active Problem List   Diagnosis     Hypertension     Obstructive sleep apnea     Medicare annual wellness visit, subsequent     Prediabetes     Left bundle branch block     Morbid obesity (H)     Bilateral sensorineural hearing loss     Pain of left scapula     Acute left-sided thoracic back pain     Cervicalgia       Current Outpatient Medications   Medication Sig Dispense Refill     cyclobenzaprine (FLEXERIL) 5 MG tablet Take 1-2 tablets (5-10 mg) by mouth 3 times daily as needed for muscle spasms 20 tablet 1     diclofenac (VOLTAREN) 1 % topical gel Apply 4 g topically 4 times daily 100 g 0     hydrochlorothiazide (HYDRODIURIL) 25 MG tablet Take 1 tablet (25 mg) by mouth daily 90 tablet 4     ketoconazole (NIZORAL) 2 % external cream Apply topically 2 times daily 30 g 0     lisinopril (ZESTRIL) 5 MG tablet Take 1 tablet (5 mg) by mouth daily 90 tablet 4     meloxicam (MOBIC) 7.5 MG tablet Take 1 tablet (7.5 mg) by mouth 2 times daily as needed for pain 30 tablet 0     omeprazole (PRILOSEC) 20 MG DR capsule Take 1 capsule (20 mg) by mouth daily 90 capsule 3     propranolol (INDERAL) 40 MG tablet Take one-half tab in the AM and one-half tab in the PM. 90 tablet 4       No Known Allergies    Family History   Problem Relation Age of Onset     Pancreatic Cancer Mother 80     Diabetes Father      Heart Disease Father        Additional medical/Social/Surgical histories reviewed in [facility] and updated as appropriate.       PHYSICAL EXAM  Wt 115.6 kg (254 lb 12.8 oz)   BMI 38.74 kg/m      General  - normal appearance, in no obvious distress  Musculoskeletal - cervical spine  - inspection: normal bone and joint alignment, no obvious kyphosis  - palpation: no paravertebral or bony tenderness  - ROM: normal and  painless flexion, extension, left and right sidebending and rotation  - strength: upper extremities 5/5 in all planes  - special tests:  (-) Spurling bilaterally  Musculoskeletal - Left shoulder  - inspection: normal bone and joint alignment, no obvious deformity, no scapular winging, no AC step-off  - palpation: Tenderness to palpation at left periscapular region, mid rhomboid with hypertonicity and mild tenderness.  - ROM: Full range of motion of shoulder without provoking rhomboid.  - strength: 5/5  strength, 5/5 in all shoulder planes  Neuro  - no sensory or motor deficit, grossly normal coordination, normal muscle tone  Skin  - no ecchymosis, erythema, warmth, or induration, no obvious rash  Psych  - interactive, appropriate, normal mood and affect    IMAGING :  XR CERVICAL SPINE TWO-THREE VIEWS 5/17/2021 4:09 PM      HISTORY: Left shoulder pain, unspecified chronicity.     COMPARISON: None.                                                                      IMPRESSION: Straightening of the normal cervical lordosis. Minimal  rightward curvature of the craniocervical junction and leftward  curvature at the cervicothoracic junction. Normal vertebral body  heights. Moderate disc space narrowing at C6-C7 with small multilevel  marginal anterior endplate osteophytes. Minimal disc space narrowing  elsewhere. No definite radiographic findings of advanced facet  arthropathy. The prevertebral soft tissues appear within normal  limits.     TREVOR MOODY MD    Cervical x-ray reviewed.  Extensive review of PT notes, review of recent family practice visit with Dr. Ley.    ASSESSMENT & PLAN  Mr. Martinez is a 71 year old year old male who presents to clinic today with acute left-sided periscapular pain.  Clinical history as well as examination today suggestive of left rhomboid trigger point/strain.  There is still a possibility is suffering from radicular pain from cervical or thoracic spine however this seems less  likely without provocation with spinal movement.    Diagnosis: Pain of left scapula, trigger point of left rhomboid    Dev states that he is about 90% improved after starting formal physical therapy.  We discussed additional treatment measures that are available to him including use of over-the-counter lidocaine patches, heat, massage such as tennis ball.  He can continue his prescription for Flexeril nightly     Thera cane massager ordered which is coming this week which I think will be helpful, additionally he may use a tennis ball.       I discussed consideration for trigger point injection if pain does not improve.  I would also like him to keep a symptom diary to determine whether cervical motion begins to cause any radiating pain to the left shoulder.  If this does occur, I would order MRI of cervical spine.    It was a pleasure seeing Dev today.    Norm Jiménez DO, [facility]  Primary Care Sports Medicine    38 minutes on date of the encounter doing chart review, history and examination, independent imaging review, documentation, and additional activities noted above.          Again, thank you for allowing me to participate in the care of your patient.        Sincerely,        Norm Jiménez DO

## 2021-06-10 ENCOUNTER — TRANSFERRED RECORDS (OUTPATIENT)
Dept: HEALTH INFORMATION MANAGEMENT | Facility: CLINIC | Age: 71
End: 2021-06-10

## 2021-06-14 ENCOUNTER — OFFICE VISIT (OUTPATIENT)
Dept: OTOLARYNGOLOGY | Facility: CLINIC | Age: 71
End: 2021-06-14
Payer: COMMERCIAL

## 2021-06-14 ENCOUNTER — PRE VISIT (OUTPATIENT)
Dept: OTOLARYNGOLOGY | Facility: CLINIC | Age: 71
End: 2021-06-14

## 2021-06-14 VITALS
WEIGHT: 257.94 LBS | HEART RATE: 54 BPM | HEIGHT: 68 IN | OXYGEN SATURATION: 99 % | SYSTOLIC BLOOD PRESSURE: 142 MMHG | RESPIRATION RATE: 14 BRPM | DIASTOLIC BLOOD PRESSURE: 78 MMHG | BODY MASS INDEX: 39.09 KG/M2 | TEMPERATURE: 97.5 F

## 2021-06-14 DIAGNOSIS — H90.3 BILATERAL SENSORINEURAL HEARING LOSS: Primary | ICD-10-CM

## 2021-06-14 PROCEDURE — 99203 OFFICE O/P NEW LOW 30 MIN: CPT | Performed by: OTOLARYNGOLOGY

## 2021-06-14 ASSESSMENT — MIFFLIN-ST. JEOR: SCORE: 1909.5

## 2021-06-14 ASSESSMENT — PAIN SCALES - GENERAL: PAINLEVEL: NO PAIN (0)

## 2021-06-14 NOTE — PATIENT INSTRUCTIONS
1. You were seen in the clinic today by Dr. Dong. We have given you copies of your audiogram results and placed the order in our system for your hearing aid referral. Should you need any further help with this, please contact us.    2.   If symptoms worsen or fail to improve, please return to the ENT clinic.    If you have any questions or concerns after your appointment, please call the clinic.    -Clinic phone [phone]. Press option #1 for scheduling related needs. Press option #3 for nurse advice.   -Direct phone [phone]    Annel Casiano LPN  [facility]  Department of Otolaryngology

## 2021-06-14 NOTE — LETTER
Hearing Aid Medical Clearance    Dev Martinez  June 14, 2021        This patient has received a medical examination and may be considered a suitable candidate for a hearing aid.         Physician:__________________________________________________          Robert MD Letitia

## 2021-06-14 NOTE — LETTER
6/14/2021       RE: Dev Martinez  [address]  [facility] [address]     Dear Colleague,    Thank you for referring your patient, Dev Martinez, to the [facility] EAR NOSE AND THROAT CLINIC [place] [facility]. Please see a copy of my visit note below.    HISTORY OF PRESENT ILLNESS:  Mr. Martinez is here to see us today for evaluation of his hearing.  He had been seen by me in 2015 for similar symptoms at which time he had asymmetric sensorineural hearing loss, worse in the right ear.  He did have an MRI scan at that time which did not demonstrate any retrocochlear lesion.  His hearing has been worsening gradually.  He does not have any otalgia, otorrhea, vertigo, previous ear surgery or history of ear trauma.    PAST MEDICAL HISTORY, PAST SURGICAL HISTORY, MEDICATIONS, ALLERGIES, FAMILY HISTORY, SOCIAL HISTORY:  Noted and reviewed in the chart.    REVIEW OF SYSTEMS:  Pertinent positives and negatives as above.  Otherwise, complete review of systems is noted and reviewed in the chart.    PHYSICAL EXAMINATION:  This is a 71-year-old gentleman in no acute distress.  Normal mood, normal affect, normal ability to communicate.  Alert and appropriate.  Head is normocephalic.  Cranial nerve VII is House-Brackmann I/VI bilaterally.  Breathing without difficulty or stridor.  Eyes are anicteric.  Skin of the head and neck appears normal.  Examination of the nose shows normal nasal mucosa.  Examination of the ears show normal external auditory canals and tympanic membranes bilaterally.  Oral cavity shows normal tongue, buccal mucosa and oropharynx.    AUDIOGRAM:  Audiogram is reviewed and shows some decrease in the left ear hearing and now more or less symmetry between the two ears.    ASSESSMENT AND PLAN:  A 71-year-old with bilateral sensorineural hearing loss.  At this time, I would recommend that he try hearing aids.  He is cleared for that, and he will  follow up with us as needed in a year with repeat audiogram.          Again, thank you for allowing me to participate in the care of your patient.      Sincerely,    Robert Dong MD

## 2021-06-14 NOTE — NURSING NOTE
"Chief Complaint   Patient presents with     Consult     hearing loss      Blood pressure (!) 142/78, pulse 54, temperature 97.5  F (36.4  C), resp. rate 14, height 1.727 m (5' 8\"), weight 117 kg (257 lb 15 oz), SpO2 99 %.    Humberto Hutchison LPN    "

## 2021-06-21 NOTE — PROGRESS NOTES
HISTORY OF PRESENT ILLNESS:  Mr. Martinez is here to see us today for evaluation of his hearing.  He had been seen by me in 2015 for similar symptoms at which time he had asymmetric sensorineural hearing loss, worse in the right ear.  He did have an MRI scan at that time which did not demonstrate any retrocochlear lesion.  His hearing has been worsening gradually.  He does not have any otalgia, otorrhea, vertigo, previous ear surgery or history of ear trauma.    PAST MEDICAL HISTORY, PAST SURGICAL HISTORY, MEDICATIONS, ALLERGIES, FAMILY HISTORY, SOCIAL HISTORY:  Noted and reviewed in the chart.    REVIEW OF SYSTEMS:  Pertinent positives and negatives as above.  Otherwise, complete review of systems is noted and reviewed in the chart.    PHYSICAL EXAMINATION:  This is a 71-year-old gentleman in no acute distress.  Normal mood, normal affect, normal ability to communicate.  Alert and appropriate.  Head is normocephalic.  Cranial nerve VII is House-Brackmann I/VI bilaterally.  Breathing without difficulty or stridor.  Eyes are anicteric.  Skin of the head and neck appears normal.  Examination of the nose shows normal nasal mucosa.  Examination of the ears show normal external auditory canals and tympanic membranes bilaterally.  Oral cavity shows normal tongue, buccal mucosa and oropharynx.    AUDIOGRAM:  Audiogram is reviewed and shows some decrease in the left ear hearing and now more or less symmetry between the two ears.    ASSESSMENT AND PLAN:  A 71-year-old with bilateral sensorineural hearing loss.  At this time, I would recommend that he try hearing aids.  He is cleared for that, and he will follow up with us as needed in a year with repeat audiogram.

## 2021-07-26 ENCOUNTER — OFFICE VISIT (OUTPATIENT)
Dept: FAMILY MEDICINE | Facility: CLINIC | Age: 71
End: 2021-07-26
Payer: COMMERCIAL

## 2021-07-26 VITALS
RESPIRATION RATE: 15 BRPM | TEMPERATURE: 97.3 F | HEART RATE: 59 BPM | DIASTOLIC BLOOD PRESSURE: 85 MMHG | HEIGHT: 69 IN | SYSTOLIC BLOOD PRESSURE: 153 MMHG | BODY MASS INDEX: 37.18 KG/M2 | OXYGEN SATURATION: 96 % | WEIGHT: 251 LBS

## 2021-07-26 DIAGNOSIS — R55 PRE-SYNCOPE: Primary | ICD-10-CM

## 2021-07-26 LAB
ERYTHROCYTE [DISTWIDTH] IN BLOOD BY AUTOMATED COUNT: 13 % (ref 10–15)
HCT VFR BLD AUTO: 50.9 % (ref 40–53)
HGB BLD-MCNC: 17.9 G/DL (ref 13.3–17.7)
IRON SATN MFR SERPL: 39 % (ref 15–46)
IRON SERPL-MCNC: 129 UG/DL (ref 35–180)
MCH RBC QN AUTO: 30.9 PG (ref 26.5–33)
MCHC RBC AUTO-ENTMCNC: 35.2 G/DL (ref 31.5–36.5)
MCV RBC AUTO: 88 FL (ref 78–100)
PLATELET # BLD AUTO: 182 10E3/UL (ref 150–450)
RBC # BLD AUTO: 5.8 10E6/UL (ref 4.4–5.9)
TIBC SERPL-MCNC: 327 UG/DL (ref 240–430)
TRANSFERRIN SERPL-MCNC: 284 MG/DL (ref 210–360)
WBC # BLD AUTO: 8.7 10E3/UL (ref 4–11)

## 2021-07-26 PROCEDURE — 85027 COMPLETE CBC AUTOMATED: CPT | Performed by: FAMILY MEDICINE

## 2021-07-26 PROCEDURE — 82306 VITAMIN D 25 HYDROXY: CPT | Performed by: FAMILY MEDICINE

## 2021-07-26 PROCEDURE — 83550 IRON BINDING TEST: CPT | Performed by: FAMILY MEDICINE

## 2021-07-26 PROCEDURE — 84466 ASSAY OF TRANSFERRIN: CPT | Performed by: FAMILY MEDICINE

## 2021-07-26 ASSESSMENT — MIFFLIN-ST. JEOR: SCORE: 1887.29

## 2021-07-26 NOTE — PROGRESS NOTES
"SUBJECTIVE:   Dev Martinez is a 71 year old male who presents to clinic today to discuss the following problem(s).    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)  - suddenly felt like his vision was going, like he was going to pass out  - he didn't pass out, the episode was very brief and resolved spontaneously   - no repeat of this since then  - med list notable for antihypertensives, muscle relaxant, beta blocker  - tries to stay well hydrated  - diagnosed diabetic, wonders if this could have been due to \"low blood sugars\"  - denies any sensation of heart palpitations or chest pain  - does have history of AVA and uses CPAP regularly, is due for a visit with sleep medicine to assess device     Elevated blood pressure  - persistent elevated in clinic today  - does note they were even higher after the pre-syncopal episode  - typically his home pressures are in the low 130s over 80s  - as above, denies chest pressure, shortness of breath, headache     ROS: 10 point ROS neg other than the symptoms noted above in the HPI.    Today's PHQ-2:  PHQ-2 ( 1999 [employer]) 7/26/2021 6/14/2021   Q1: Little interest or pleasure in doing things 0 0   Q2: Feeling down, depressed or hopeless 1 1   PHQ-2 Score 1 1       Past Medical History:   Diagnosis Date     Double vision      Gout      Hearing loss      Heartburn      Hypertension      Ringing in ears      Sleep difficulties      Snoring      Sore throat      Weight gain      History reviewed. No pertinent surgical history.  Family History   Problem Relation Age of Onset     Pancreatic Cancer Mother 80     Diabetes Father      Heart Disease Father      Social History     Tobacco Use     Smoking status: Current Some Day Smoker     Years: 8.00     Types: Cigars     Start date: 6/15/2008     Smokeless tobacco: Never Used     Tobacco comment: smokes " cigars   Substance Use Topics     Alcohol use: Yes     Comment: 7     Drug use: No     Social History     Social History Narrative     Not on file       Current Outpatient Medications   Medication     cyclobenzaprine (FLEXERIL) 5 MG tablet     diclofenac (VOLTAREN) 1 % topical gel     hydrochlorothiazide (HYDRODIURIL) 25 MG tablet     ketoconazole (NIZORAL) 2 % external cream     lisinopril (ZESTRIL) 5 MG tablet     meloxicam (MOBIC) 7.5 MG tablet     omeprazole (PRILOSEC) 20 MG DR capsule     propranolol (INDERAL) 40 MG tablet     No current facility-administered medications for this visit.     I have reviewed the patient's past medical, surgical, family, and social history.     OBJECTIVE:   There were no vitals taken for this visit.    Constitutional: well-appearing, appears stated age  Eyes: conjunctivae without erythema, sclera anicteric.   Cardiac: regular rate and rhythm, normal S1/S2, no murmur/rubs/gallops  Respiratory: lungs clear to auscultation bilaterally, normal work of breathing, no wheezes/crackles  Skin: no rashes, lesions, or wounds  Psych: affect is full and appropriate, speech is fluent and non-pressured    ASSESSMENT AND PLAN:     Dev was seen today for dizziness.    Diagnoses and all orders for this visit:    Pre-syncope  -     CBC with Plt ([facility]); Future  -     Transferrin; Future  -     Iron and iron binding capacity; Future  -     Vitamin D Deficiency; Future  -     CBC with Plt ([facility])  -     Transferrin  -     Iron and iron binding capacity  -     Vitamin D Deficiency    Not entirely clear diagnosis at this point. Possibly vasovagal episode. Less likely the result of medications, infection, cardiac arrhythmia, ischemia, orthostatic hypotension, dehydration, heat syncope. Will assess for possible anemia and patient asks about possible vitamin D deficiency which is unlikely, but I have agreed to test with labs. Will notify Dev of lab results as available.       Jj Ley MD  [facility]  [facility]  07/26/2021, 11:54 AM

## 2021-07-26 NOTE — NURSING NOTE
"71 year old  Chief Complaint   Patient presents with     Dizziness     episode of extreme lightheadedness 3 days ago,        Blood pressure (!) 152/83, pulse 59, temperature 97.3  F (36.3  C), temperature source Skin, resp. rate 15, height 1.742 m (5' 8.58\"), weight 113.9 kg (251 lb), SpO2 96 %. Body mass index is 37.52 kg/m .  Patient Active Problem List   Diagnosis     Hypertension     Obstructive sleep apnea     Medicare annual wellness visit, subsequent     Prediabetes     Left bundle branch block     Morbid obesity (H)     Bilateral sensorineural hearing loss     Pain of left scapula     Acute left-sided thoracic back pain     Cervicalgia       Wt Readings from Last 2 Encounters:   07/26/21 113.9 kg (251 lb)   06/14/21 117 kg (257 lb 15 oz)     BP Readings from Last 3 Encounters:   07/26/21 (!) 152/83   06/14/21 (!) 142/78   06/03/21 124/78         Current Outpatient Medications   Medication     cyclobenzaprine (FLEXERIL) 5 MG tablet     diclofenac (VOLTAREN) 1 % topical gel     hydrochlorothiazide (HYDRODIURIL) 25 MG tablet     ketoconazole (NIZORAL) 2 % external cream     lisinopril (ZESTRIL) 5 MG tablet     omeprazole (PRILOSEC) 20 MG DR capsule     propranolol (INDERAL) 40 MG tablet     meloxicam (MOBIC) 7.5 MG tablet     No current facility-administered medications for this visit.       Social History     Tobacco Use     Smoking status: Current Some Day Smoker     Years: 8.00     Types: Cigars     Start date: 6/15/2008     Smokeless tobacco: Never Used     Tobacco comment: smokes cigars   Substance Use Topics     Alcohol use: Yes     Comment: 7     Drug use: No       Health Maintenance Due   Topic Date Due     DIABETIC FOOT EXAM  Never done     ADVANCE CARE PLANNING  Never done     ZOSTER IMMUNIZATION (2 of 3) 03/14/2016     MICROALBUMIN  12/19/2020     EYE EXAM  02/20/2021       No results found for: PAP      July 26, 2021 3:46 PM    "

## 2021-07-27 LAB — DEPRECATED CALCIDIOL+CALCIFEROL SERPL-MC: 31 UG/L (ref 20–75)

## 2021-08-21 PROBLEM — M54.6 ACUTE LEFT-SIDED THORACIC BACK PAIN: Status: RESOLVED | Noted: 2021-05-21 | Resolved: 2021-08-21

## 2021-08-21 PROBLEM — M89.8X1 PAIN OF LEFT SCAPULA: Status: RESOLVED | Noted: 2021-05-21 | Resolved: 2021-08-21

## 2021-08-21 PROBLEM — M54.2 CERVICALGIA: Status: RESOLVED | Noted: 2021-05-21 | Resolved: 2021-08-21

## 2021-08-21 NOTE — PROGRESS NOTES
Patient did not return after initial evaluation.  Please see initial evaluation from 5/20/21 for discharge status.

## 2021-09-02 ENCOUNTER — MYC MEDICAL ADVICE (OUTPATIENT)
Dept: FAMILY MEDICINE | Facility: CLINIC | Age: 71
End: 2021-09-02

## 2021-09-02 ENCOUNTER — OFFICE VISIT (OUTPATIENT)
Dept: FAMILY MEDICINE | Facility: CLINIC | Age: 71
End: 2021-09-02
Payer: COMMERCIAL

## 2021-09-02 VITALS
RESPIRATION RATE: 15 BRPM | SYSTOLIC BLOOD PRESSURE: 135 MMHG | HEART RATE: 58 BPM | HEIGHT: 69 IN | WEIGHT: 251 LBS | TEMPERATURE: 97 F | DIASTOLIC BLOOD PRESSURE: 75 MMHG | OXYGEN SATURATION: 98 % | BODY MASS INDEX: 37.18 KG/M2
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
## 2021-09-02 RX ORDER — AMOXICILLIN AND CLAVULANATE POTASSIUM 500; 125 MG/1; MG/1
1 TABLET, FILM COATED ORAL 2 TIMES DAILY
Qty: 20 TABLET | Refills: 0 | Status: SHIPPED | OUTPATIENT
Start: 2021-09-02 | End: 2021-12-20

## 2021-09-02 ASSESSMENT — MIFFLIN-ST. JEOR: SCORE: 1887.24

## 2021-09-02 NOTE — PROGRESS NOTES
"CHIEF COMPLAINT: Cat bites      HISTORY OF PRESENT ILLNESS:  Dev Martinez is a 71 year old male who presents for evaluation of cat bites on his right thumb and right calf.     Yesterday, Dev sustained at least three cat bites on his right hand, right thumb, and his posterior right calf from a stray cat that he had taken in. The cat has also scratched him on his back. The cat had been found by Dev's wife, who had taken the cat to the  on 8/13/21 for a rabies vaccination. They do not have any information on that cat's previous medical or behavioral history. He reports that the cat will be purring and friendly then suddenly bite or scratch him, sometimes without any initiating interaction from Dev. The cat has also been \"suspicious\" about one of their carpets. Dev has been trying to keep the cat outside on his porch. He is concerned that the wounds may be infected.       MEDICATIONS:   Carefully Reviewed      REVIEW OF SYSTEMS:  10-point review of systems negative unless otherwise stated in the HPI.       OBJECTIVE:    EXAM:  GENERAL: Dev is in no distress.  Affect is upbeat.   Alert and oriented x3  /75 (BP Location: Right arm, Patient Position: Sitting, Cuff Size: Adult Large)   Pulse 58   Temp 97  F (36.1  C) (Skin)   Resp 15   Ht 1.742 m (5' 8.58\")   Wt 113.9 kg (251 lb)   SpO2 98%   BMI 37.52 kg/m    HEENT:  Head is normocephalic, atraumatic.  Eyes are grossly normal.  Nose and mouth masked.   EXTREMITIES:  Ankles free of any edema.   Right thumb: lateral aspect of right thumb puncture site, raised, red, tender to palpation.   Right calf: two puncture sites on the posterior aspect of right calf  SKIN:  Warm and dry.      LABORATORY DATA:   No results found for any visits on 09/02/21.      ASSESSMENT AND PLAN:   1. Cat bite, initial encounter: TD booster administered in-clinic today. Dev will start amoxicillin-clavulanate 500-125 mg, one tablet daily for 10 days to cover " Pasteurella. He will contact his  for information on rabies symptoms and how soon the rabies vaccination becomes effective, and fill me in via [facility]. He will continue to keep the cat isolated on his porch and monitor the cat's behavior over the next week or two for worsening symptoms. If the cat behaves even more oddly and/or dies, the cat will be checked for rabies and we will immunize Peter accordingly.      I, Ginger Brewster, am serving as a scribe to document services personally performed by Dr. Behzad Pisano, based on data collection and the provider's statements to me.     Attestation: I, Dr. Behzad Pisano, have reviewed, edited, and approved the above note.

## 2021-09-02 NOTE — NURSING NOTE
Prior to immunization administration, verified patients identity using patient s name and date of birth. Please see Immunization Activity for additional information.     Screening Questionnaire for Adult Immunization    Are you sick today?   No   Do you have allergies to medications, food, a vaccine component or latex?   No   Have you ever had a serious reaction after receiving a vaccination?   No   Do you have a long-term health problem with heart, lung, kidney, or metabolic disease (e.g., diabetes), asthma, a blood disorder, no spleen, complement component deficiency, a cochlear implant, or a spinal fluid leak?  Are you on long-term aspirin therapy?   No   Do you have cancer, leukemia, HIV/AIDS, or any other immune system problem?   No   Do you have a parent, brother, or sister with an immune system problem?   No   In the past 3 months, have you taken medications that affect  your immune system, such as prednisone, other steroids, or anticancer drugs; drugs for the treatment of rheumatoid arthritis, Crohn s disease, or psoriasis; or have you had radiation treatments?   No   Have you had a seizure, or a brain or other nervous system problem?   No   During the past year, have you received a transfusion of blood or blood    products, or been given immune (gamma) globulin or antiviral drug?   No   For women: Are you pregnant or is there a chance you could become       pregnant during the next month?   No   Have you received any vaccinations in the past 4 weeks?   No     Immunization questionnaire answers were all negative.        Per orders of Dr. Robledo, injection of TD given by Juani Ireland MA. Patient instructed to remain in clinic for 15 minutes afterwards, and to report any adverse reaction to me immediately.       Screening performed by Juani Ireland MA on 9/2/2021 at 11:22 AM.

## 2021-09-02 NOTE — NURSING NOTE
"71 year old  Chief Complaint   Patient presents with     Cat Bite     stray cat they took in, bite yesterday on thumb and back of right calf       Blood pressure 135/75, pulse 58, temperature 97  F (36.1  C), temperature source Skin, resp. rate 15, height 1.742 m (5' 8.58\"), weight 113.9 kg (251 lb), SpO2 98 %. Body mass index is 37.52 kg/m .  Patient Active Problem List   Diagnosis     Essential hypertension     Obstructive sleep apnea on CPAP     Medicare annual wellness visit, subsequent     Prediabetes     Left bundle branch block     Morbid obesity (H)     Bilateral sensorineural hearing loss       Wt Readings from Last 2 Encounters:   09/02/21 113.9 kg (251 lb)   07/26/21 113.9 kg (251 lb)     BP Readings from Last 3 Encounters:   09/02/21 135/75   07/26/21 (!) 153/85   06/14/21 (!) 142/78         Current Outpatient Medications   Medication     cyclobenzaprine (FLEXERIL) 5 MG tablet     diclofenac (VOLTAREN) 1 % topical gel     hydrochlorothiazide (HYDRODIURIL) 25 MG tablet     ketoconazole (NIZORAL) 2 % external cream     lisinopril (ZESTRIL) 5 MG tablet     omeprazole (PRILOSEC) 20 MG DR capsule     propranolol (INDERAL) 40 MG tablet     No current facility-administered medications for this visit.       Social History     Tobacco Use     Smoking status: Current Some Day Smoker     Years: 8.00     Types: Cigars     Start date: 6/15/2008     Smokeless tobacco: Never Used     Tobacco comment: smokes cigars   Substance Use Topics     Alcohol use: Yes     Comment: 7     Drug use: No       Health Maintenance Due   Topic Date Due     DIABETIC FOOT EXAM  Never done     ADVANCE CARE PLANNING  Never done     ZOSTER IMMUNIZATION (2 of 3) 03/14/2016     MICROALBUMIN  12/19/2020     EYE EXAM  02/20/2021     INFLUENZA VACCINE (1) 09/01/2021     A1C  09/03/2021       No results found for: PAP      September 2, 2021 10:40 AM    "

## 2021-09-13 NOTE — TELEPHONE ENCOUNTER
I spoke to patient - he reports the cat bit him again on the arm. No symptoms of infection at this time. The bite occurred between the second to the last, and the last dose of Augmentin that was prescribed 9/2/21 for first cat bite. Discussed this with Dr. Ley here at [facility] and advised to closely watch for s/s of infection and reviewed these with the patient. If any symptoms, call back and if after hours he can connect with [facility] for assessment. Patient advised not to wait if symptoms arise. Cat is in works to rehome, as it does not get along with current cats. Pt verbalized understanding and agrees with this plan.  Lorena Chong MS RN-BC  09/13/21  1:43 PM

## 2021-09-13 NOTE — TELEPHONE ENCOUNTER
Who is calling? Patient  Reason for Call:Got scratched again by the same cat - wants to know if he needs another dose of antibiotics/

## 2021-09-25 ENCOUNTER — HEALTH MAINTENANCE LETTER (OUTPATIENT)
Age: 71
End: 2021-09-25

## 2021-11-10 DIAGNOSIS — B35.6 TINEA CRURIS: ICD-10-CM

## 2021-11-10 NOTE — TELEPHONE ENCOUNTER
Ketoconazole (Nizoral) 2 % external cream    Last Office Visit: 9/2/21  Future [facility] Appointments: None  Medication last refilled: 8/5/20 #30 g with 0 refill(s)    Routing refill request to provider for review/approval because:  Drug interaction warning Ketoconazole and Omeprazole    Meli Grubbs RN, BSN

## 2021-11-11 RX ORDER — KETOCONAZOLE 20 MG/G
CREAM TOPICAL 2 TIMES DAILY
Qty: 30 G | Refills: 1 | Status: SHIPPED | OUTPATIENT
Start: 2021-11-11 | End: 2022-10-24

## 2021-12-06 DIAGNOSIS — M10.072 ACUTE IDIOPATHIC GOUT OF LEFT FOOT: ICD-10-CM

## 2021-12-06 RX ORDER — PREDNISONE 20 MG/1
TABLET ORAL
Qty: 15 TABLET | Refills: 1 | Status: SHIPPED | OUTPATIENT
Start: 2021-12-06 | End: 2022-03-10

## 2021-12-06 NOTE — TELEPHONE ENCOUNTER
Who is calling? Patient  Medication name: predniSONE (DELTASONE) 20 MG tablet  Is this a refill request? Yes  Have they contacted the pharmacy?  No  [id number] [place] [address]  [address] [facility] [address]  Phone: [phone] Fax: [phone]  Question/Concern: gout flare up, requesting refill   Would patient like a call back? Yes if needed

## 2021-12-06 NOTE — TELEPHONE ENCOUNTER
Prednisone (Deltasone) 20 mg    Last Office Visit: 9/2/21  Future [facility] Appointments: None  Medication last refilled: 8/3/18 #15 with 1 refill(s)    Routing refill request to provider for review/approval because:  A break in medication    Meli Grubbs, RN, BSN

## 2021-12-20 ENCOUNTER — TELEPHONE (OUTPATIENT)
Dept: FAMILY MEDICINE | Facility: CLINIC | Age: 71
End: 2021-12-20
Payer: COMMERCIAL

## 2021-12-20 NOTE — PROGRESS NOTES
Dev is a 71 year old who is being evaluated via a billable video visit.    Can you please confirm what state you are currently located in? [place]     How would you like to obtain your AVS? MyChart  If the video visit is dropped, the invitation should be resent by: Text to cell phone: mobile  Will anyone else be joining your video visit? No     Video Start Time: 11:46 AM   Video End Time: 12:04 PM   Total video time: 18 minutes    Total time spent with the patient in history gathering, chart review, examination, counseling, placing orders and documentation was 22 minutes.      Assessment & Plan     1.  Gout (fluid-confirmed).  Finish current (and second) course of prednisone.  Se below for details.  2. Strongly recommend against traveling to [place] at this time.      Behzad Pisano MD  [facility]    I, Ginger Brewster, am serving as a scribe to document services personally performed by Dr. Behzad Pisano, based on data collection and the provider's statements to me. Dr. Pisano has reviewed, edited, and approved the above note.     I, Dr. Behzad Pisano, have interviewed and examined this patient, and I have thoroughly reviewed and edited this note and agree with its contents.        Subjective   Dev is a 71 year old who presents for the following health issues:     HPI     Dev has a history of idiopathic gout of his left foot, confirmed by synovial fluid aspiration testing in May 2018. He has taken prednisone for flares and sent in a refill request on 12/6/21 for a flare up. He spoke to Meli Grubbs RN here at [facility] on 12/20/21 regarding gout of his left great toe. Dev reported that he had taken a 1.5 week course of prednisone and his flare subsided. However, he had been off the prednisone for a few days at that point and the gout was returning. It was recommended that he soak his foot once daily in warm water with epsom salt and repeat his course of prednisone.  He was also interested in discussing a  "preventive gout medication during today's visit.     Today, Dev reports that prior to these episodes, he had not had a gout flare in his left great toe since 2018. Given that it has been so long, a daily preventive medication is likely not needed at this point. When the pain started returning in the instep of his left foot, he started a course of prednisone at 40 mg for five days followed by 20 mg for five days. Today is the fourth day of his 40 mg dose of his second prednisone course. He has not noticed any increased redness over the affected area and is currently having pain rated at a 0-1/10. Dev does have a bunion affecting his left great toe and he would be interested in seeing a podiatrist or orthopedist in the future if this worsens. We discussed that this increased \"scuffing\" in the area of his left great toe could be increasing his risk of gout flares.     Dev sent a [facility] message yesterday with a question regarding his recent prednisone use and COVID-19. He has received three doses of the Pfizer COVID-19 vaccine, last dose administered on 11/16/21. He has read about prednisone impacting \"relative immunity\" and is concerned that his current use of prednisone could reduce the effectiveness of his vaccinations. He was on the third day of his repeat course of prednisone yesterday at the time of his message. Specifically, he is concerned about potentially traveling to [place] to join his wife, who is currently there visiting family. He would depart to [place] on 12/26/21 and fly from there to [facility]. After reading information from the CDC, [facility], and WHO, he is \"leery\" of making this trip and he would like my advice on this.    Today, Dev notes that he does not feel like traveling to [place] is a good idea given the current state of the pandemic. Additionally, his family in [place] has two young children that are unable to be vaccinated. We discussed his concerns at length and I have agreed that " "international travel is not advisable at this time, especially given the number of stops and forms of transportation that Dev would need to take to get to his destination. In terms of the COVID-19 and Dev's prednisone use, we discussed that his current use of prednisone is not likely to be impacting the efficacy of his COVID-19 vaccinations as his third booster was on 11/16/21.      Review of Systems   Constitutional, HEENT, cardiovascular, pulmonary, gi and gu systems are negative, except as otherwise noted.      Objective    Vitals - Patient Reported  Weight (Patient Reported): 113.4 kg (250 lb)  Height (Patient Reported): 172.7 cm (5' 8\")  BMI (Based on Pt Reported Ht/Wt): 38.01    Vitals:  No vitals were obtained today due to virtual visit.    Physical Exam   GENERAL: Healthy, alert and no distress  EYES: Eyes grossly normal to inspection.  No discharge or erythema, or obvious scleral/conjunctival abnormalities.  RESP: No audible wheeze, cough, or visible cyanosis.  No visible retractions or increased work of breathing.    SKIN: Visible skin clear. No significant rash, abnormal pigmentation or lesions.  NEURO: Cranial nerves grossly intact.  Mentation and speech appropriate for age.  PSYCH: Mentation appears normal, affect normal/bright, judgement and insight intact, normal speech and appearance well-groomed.      Video-Visit Details    Type of service:  Video Visit    Video End Time:12:04 PM    Originating Location (pt. Location): Home    Distant Location (provider location):  [facility]     Platform used for Video Visit: Alejandrina  "

## 2021-12-20 NOTE — TELEPHONE ENCOUNTER
Spoke with Dev regarding gout of his great toe.  Dev reports he took a course of Prednisone this past week and a half and his flare subsided.  However, now he has been off the prednisone for a few days and it is back.  Recommended he soak his foot once daily in warm water with epson salt and repeat his course of prednisone.  Dev has a video visit already scheduled with Dr. Pisano this Thursday, 12/23/21.  Recommended discussing with Dr. Pisano starting on a preventative gout medication.  Dev will call back if his symptoms worsen or he has other questions or concerns before Thursday.  Meli Grubbs RN, BSN  [facility]  12/20/21  10:41 AM

## 2021-12-20 NOTE — TELEPHONE ENCOUNTER
Who is calling? Patient  Reason for Call: Scheduled patient for virtual this Thursday. Wants to know if he should continue taking Prednisone and the reason for the appointment is patient is not getting better with the medication.

## 2021-12-23 ENCOUNTER — VIRTUAL VISIT (OUTPATIENT)
Dept: FAMILY MEDICINE | Facility: CLINIC | Age: 71
End: 2021-12-23
Payer: COMMERCIAL

## 2021-12-23 DIAGNOSIS — M10.9 ACUTE GOUTY ARTHRITIS: Primary | ICD-10-CM

## 2022-01-15 ENCOUNTER — HEALTH MAINTENANCE LETTER (OUTPATIENT)
Age: 72
End: 2022-01-15

## 2022-02-07 DIAGNOSIS — K21.9 GASTROESOPHAGEAL REFLUX DISEASE WITHOUT ESOPHAGITIS: ICD-10-CM

## 2022-02-08 RX ORDER — ESOMEPRAZOLE MAGNESIUM 20 MG/1
GRANULE, DELAYED RELEASE ORAL
Refills: 0 | OUTPATIENT
Start: 2022-02-08

## 2022-02-08 NOTE — TELEPHONE ENCOUNTER
Received medication refill request for medication that has never been prescribed for this pt. Requesting pharmacy has also not been used by pt. Will refuse orders.    Tommy HAMILTON, RN  02/08/22 9:53 AM

## 2022-03-10 DIAGNOSIS — M10.072 ACUTE IDIOPATHIC GOUT OF LEFT FOOT: ICD-10-CM

## 2022-03-10 NOTE — TELEPHONE ENCOUNTER
Who is calling? Patient  Medication name: predniSONE (DELTASONE) 20 MG tablet  Is this a refill request? Yes  Have they contacted the pharmacy?  Yes  [facility]   [id number] [place] [address]  [address] [facility] [address]  Phone: [phone] Fax: [phone]  Question/Concern: Refill request   Would patient like a call back? No

## 2022-03-10 NOTE — TELEPHONE ENCOUNTER
Patient requesting prednisone to have on hand in the case of gout flare up. He just finished the refill of prednisone that he received in December. He finished the last pill today and it has cleared up his gout flare of big toe. This request for refill is so he has it available in the case a new flare occurs.     Lorena Chong MS RN-BC  03/10/22  3:27 PM

## 2022-03-10 NOTE — TELEPHONE ENCOUNTER
Called and LVM to call back to speak to a clinic RN for further information about requesting prednisone refill.     Lorena Chong, MS RN-BC  03/10/22  1:33 PM

## 2022-03-11 RX ORDER — PREDNISONE 20 MG/1
TABLET ORAL
Qty: 15 TABLET | Refills: 1 | Status: SHIPPED | OUTPATIENT
Start: 2022-03-11 | End: 2022-03-21

## 2022-03-30 ENCOUNTER — NURSE TRIAGE (OUTPATIENT)
Dept: FAMILY MEDICINE | Facility: CLINIC | Age: 72
End: 2022-03-30
Payer: COMMERCIAL

## 2022-03-30 ENCOUNTER — NURSE TRIAGE (OUTPATIENT)
Dept: NURSING | Facility: CLINIC | Age: 72
End: 2022-03-30

## 2022-03-30 ENCOUNTER — VIRTUAL VISIT (OUTPATIENT)
Dept: INTERNAL MEDICINE | Facility: CLINIC | Age: 72
End: 2022-03-30
Payer: COMMERCIAL

## 2022-03-30 DIAGNOSIS — U07.1 INFECTION DUE TO 2019 NOVEL CORONAVIRUS: Primary | ICD-10-CM

## 2022-03-30 PROCEDURE — 99213 OFFICE O/P EST LOW 20 MIN: CPT | Mod: GT | Performed by: NURSE PRACTITIONER

## 2022-03-30 NOTE — TELEPHONE ENCOUNTER
Reason for Disposition    [1] Symptoms of COVID-19 (e.g., cough, fever, SOB, or others) AND [2] doctor (or NP/PA) diagnosed COVID-19 based on symptoms    [1] COVID-19 diagnosed by positive lab test (e.g., PCR, rapid self-test kit) AND [2] mild symptoms (e.g., cough, fever, others) AND [3] no complications or SOB    Answer Assessment - Initial Assessment Questions  1. COVID-19 EXPOSURE: First time out in public at crowded bar without mask this past Saturday.  2. PLACE of CONTACT: Public bar  3. TYPE of CONTACT: At the bar sitting with a lot of people.  4. DURATION of CONTACT: About 2 hours  5. MASK: 2-hours  6. DATE of CONTACT: 3/26/22  7. COMMUNITY SPREAD: Unknown  8. SYMPTOMS: Cough, nasal congestion, runny nose, joint/body aches, headache and fever ranging from 99.6-102  9. VACCINE: 2 vacines  10. BOOSTER: Yes  11. PREGNANCY OR POSTPARTUM: N/A  12. HIGH RISK: None  13. TRAVEL: None    Answer Assessment - Initial Assessment Questions  1. COVID-19 DIAGNOSIS: Home antigen test kit, positive today 3/30/22  2. COVID-19 EXPOSURE: Went out to public bar with a lot of people on Saturday, 3/26/22.  3. ONSET: Monday, 3/28/22  4. WORST SYMPTOM: Cough  5. COUGH: Yes, patient reports it is quite bothersome/intrusive  6. FEVER: Yes, ranging 99.6-102  7. RESPIRATORY STATUS: No wheezing or shortness of breath.  8. BETTER-SAME-WORSE: Same as yesterday  9. HIGH RISK DISEASE: Hypertension, AVA, Diabetes, Obesity, gouty arthritis  10. VACCINE: Yes, two. 2/24/21, 3/17/21  11. BOOSTER: Yes, 11/16/21  12. PREGNANCY: N/A  13. OTHER SYMPTOMS: Runny nose, joint/body aches and headache  14. O2 SATURATION MONITOR:  Does know    Protocols used: CORONAVIRUS (COVID-19) EXPOSURE-A-OH 1.18.2022, CORONAVIRUS (COVID-19) DIAGNOSED OR [id number] 1.18.2022    Advised Peter to stay well hydrated, rest and use tylenol for pain and fevers.  He can also use over the counter cough/cold medications following package directions - being careful not to  take more than 3000 mg of tylenol in a 24 hour period if the cough/cold tablets also have tylenol in them.  Asked him to use humidification at night in his bedroom.  Discussed  in the house from his wife if she tests negative today. Due to the fact that Dev's MASSBP score is 8, referred him to schedule a virtual visit with a [place] urgent care provider to determine if he qualifies for oral COVID treatment.  ALFONSO Booker, RN, [facility]  03/30/22  1:49 PM

## 2022-03-30 NOTE — PROGRESS NOTES
"Dev is a 72 year old who is being evaluated via a billable video visit.      How would you like to obtain your AVS? MyChart  If the video visit is dropped, the invitation should be resent by: Text to cell phone: [phone]  Will anyone else be joining your video visit? No    Video Start Time: 4:20    Assessment & Plan     Infection due to 2019 novel coronavirus  - nirmatrelvir and ritonavir (PAXLOVID) therapy pack  Dispense: 30 each; Refill: 0           Tobacco Cessation:   reports that he has been smoking cigars. He started smoking about 13 years ago. He has smoked for the past 8.00 years. He has never used smokeless tobacco.  Tobacco Cessation Action Plan: Information offered: Patient not interested at this time    BMI:   Estimated body mass index is 37.52 kg/m  as calculated from the following:    Height as of 9/2/21: 1.742 m (5' 8.58\").    Weight as of 9/2/21: 113.9 kg (251 lb).       COVID-19 positive patient.  Encounter for consideration of medication intervention. Patient does qualify for a prescription. Full discussion with patient including medication options, risks and benefits. Potential drug interactions reviewed with patient.   Treatment Planned Paxlovid, Rx sent to [facility]  [facility]   [phone]  [address] [place] [address]    Hours:  Mon-Fri: 8:30a - 6:00p  Sat-Sun: 8:30a - 12:30p    Curbside Delivery: Patient to call [phone] to set up and  at door 2 of [facility]  Temporary change to home medications: hold ketoconazole    None     Estimated body mass index is 37.52 kg/m  as calculated from the following:    Height as of 9/2/21: 1.742 m (5' 8.58\").    Weight as of 9/2/21: 113.9 kg (251 lb).  GFR Estimate   Date Value Ref Range Status   08/12/2020 78 >60 mL/min/[1.73_m2] Final     Comment:     Non  GFR Calc  Starting 12/18/2018, serum creatinine based estimated GFR (eGFR) will be   calculated using the Chronic Kidney Disease Epidemiology " Collaboration   (CKD-EPI) equation.       No results found for: [id number]    No follow-ups on file.    Julienne Hopkins NP  [facility] RONY Méndez is a 72 year old who presents for the following health issues     HPI       COVID-19 Symptom Review  How many days ago did these symptoms start? Started 2 days ago    Are any of the following symptoms significant for you?    New or worsening difficulty breathing? No    Worsening cough? Yes, it's a dry cough.     Fever or chills? Yes, the highest temperature was 100.2    Headache: YES    Sore throat: YES    Chest pain: no    Diarrhea: no    Body aches? YES    What treatments has patient tried? Decongestant - oral   Does patient live in a nursing home, group home, or shelter? no  Does patient have a way to get food/medications during quarantined? Yes, I have a friend or family member who can help me.           MASSBP Score 3/30/2022   Age Greater than or equal to 65 years 2   BMI greater than or equal to 35 kg/m2 2   Has Diabetes Mellitus 2   Has Chronic Kidney Disease 0   Has Cardiovascular Disease and 55 years or older 2   Has Chronic Respiratory Disease and 55 years or older 0   Has Hypertension and 55 years or older 1   Is Immunocompromised 0   Is Pregnant 0   Member of BIPOC community (Black/, /, ,  or , or  or [place] Native)  0   MASSBP Score 9   Has the patient had a positive COVID test outside our system?  Yes   What day did symptoms start?  3/28/2022       Current Outpatient Medications:      hydrochlorothiazide (HYDRODIURIL) 25 MG tablet, Take 1 tablet (25 mg) by mouth daily, Disp: 90 tablet, Rfl: 4     ketoconazole (NIZORAL) 2 % external cream, Apply topically 2 times daily, Disp: 30 g, Rfl: 1     lisinopril (ZESTRIL) 5 MG tablet, Take 1 tablet (5 mg) by mouth daily, Disp: 90 tablet, Rfl: 4     omeprazole (PRILOSEC) 20 MG DR capsule, Take 1 capsule  (20 mg) by mouth daily, Disp: 90 capsule, Rfl: 3     propranolol (INDERAL) 40 MG tablet, Take one-half tab in the AM and one-half tab in the PM., Disp: 90 tablet, Rfl: 4  Past Medical History:   Diagnosis Date     Double vision      Gout      Hearing loss      Heartburn      Hypertension      Ringing in ears      Sleep difficulties      Snoring      Sore throat      Weight gain          Review of Systems   Unremarkable other than listed above and below         Objective           Vitals:  No vitals were obtained today due to virtual visit.    Physical Exam   GENERAL: Healthy, alert and no distress  EYES: Eyes grossly normal to inspection.  No discharge or erythema, or obvious scleral/conjunctival abnormalities.  RESP: No audible wheeze, cough, or visible cyanosis.  No visible retractions or increased work of breathing.    SKIN: Visible skin clear. No significant rash, abnormal pigmentation or lesions.  NEURO: Cranial nerves grossly intact.  Mentation and speech appropriate for age.  PSYCH: Mentation appears normal, affect normal/bright, judgement and insight intact, normal speech and appearance well-groomed.                Video-Visit Details    Type of service:  Video Visit    Video End Time:3:11 PM    Originating Location (pt. Location): Home    Distant Location (provider location):  [facility]     Platform used for Video Visit: [facility]

## 2022-03-30 NOTE — PATIENT INSTRUCTIONS

## 2022-03-31 NOTE — TELEPHONE ENCOUNTER
Triage Call:    Patient    Patient had a positive test for COVID at home and had a video visit and he received a prescription for Paxlovid.  The pharmacy was able to get a prescription for him to start today.     He found out from work that he needs to have an official test to get paid for missed days, so rapid test results came back negative from  labs and it was sent for PCR back in 1-2 days.    He is wondering if he should still take the medications prescribed in video visit today.  Advised to follow instructions and medication from provider today given positive at home test and symptomatic.        Patient verbalized understanding about recommendations and disposition.  Encouraged to call back with any further questions.      Nuris Martines RN on 3/30/2022 at 7:10 PM        COVID 19 Nurse Triage Plan/Patient Instructions    Please be aware that novel coronavirus (COVID-19) may be circulating in the community. If you develop symptoms such as fever, cough, or SOB or if you have concerns about the presence of another infection including coronavirus (COVID-19), please contact your health care provider or visit [email or web address].     Disposition/Instructions    Home care recommended. Follow home care protocol based instructions.    Thank you for taking steps to prevent the spread of this virus.  o Limit your contact with others.  o Wear a simple mask to cover your cough.  o Wash your hands well and often.    Resources    [facility]: About COVID-19: [email or web address]    CDC: What to Do If You're Sick: www.cdc.gov/coronavirus/2019-ncov/about/steps-when-sick.html    CDC: Ending Home Isolation: www.cdc.gov/coronavirus/2019-ncov/hcp/disposition-in-home-patients.html     CDC: Caring for Someone: www.cdc.gov/coronavirus/2019-ncov/if-you-are-sick/care-for-someone.html     [facility]: Interim Guidance for Hospital Discharge to Home:  www.health.[facility].mn.us/diseases/coronavirus/hcp/hospdischarge.pdf    [facility] clinical trials (COVID-19 research studies): [email or web address].[email or web address]/umn-clinical-trials     Below are the COVID-19 hotlines at the [facility] ([facility]). Interpreters are available.   o For health questions: Call 132-941-0489 or 1-477.140.7252 (7 a.m. to 7 p.m.)  o For questions about schools and childcare: Call 600-985-2301 or 1-261.736.7940 (7 a.m. to 7 p.m.)                   Reason for Disposition    Caller has medication question, adult has minor symptoms, caller declines triage, AND triager answers question    Additional Information    Negative: Drug overdose and triager unable to answer question    Negative: Caller requesting information unrelated to medicine    Negative: Caller requesting a prescription for Strep throat and has a positive culture result    Negative: Rash while taking a medication or within 3 days of stopping it    Negative: Immunization reaction suspected    Negative: [1] Asthma and [2] having symptoms of asthma (cough, wheezing, etc.)    Negative: [1] Influenza symptoms AND [2] anti-viral med prescription request, such as Tamiflu    Negative: [1] Symptom of illness (e.g., headache, abdominal pain, earache, vomiting) AND [2] more than mild    Negative: MORE THAN A DOUBLE DOSE of a prescription or over-the-counter (OTC) drug    Negative: [1] DOUBLE DOSE (an extra dose or lesser amount) of over-the-counter (OTC) drug AND [2] any symptoms (e.g., dizziness, nausea, pain, sleepiness)    Negative: [1] DOUBLE DOSE (an extra dose or lesser amount) of prescription drug AND [2] any symptoms (e.g., dizziness, nausea, pain, sleepiness)    Negative: Took another person's prescription drug    Negative: [1] DOUBLE DOSE (an extra dose or lesser amount) of prescription drug AND [2] NO symptoms (Exception: a double dose of antibiotics)    Negative: Diabetes drug error or overdose (e.g., took wrong type  "of insulin or took extra dose)    Negative: [1] Request for URGENT new prescription or refill of \"essential\" medication (i.e., likelihood of harm to patient if not taken) AND [2] triager unable to fill per unit policy    Negative: [1] Prescription not at pharmacy AND [2] was prescribed by PCP recently    Negative: [1] Pharmacy calling with prescription questions AND [2] triager unable to answer question    Negative: [1] Caller has URGENT medication question about med that PCP or specialist prescribed AND [2] triager unable to answer question    Negative: [1] Caller has NON-URGENT medication question about med that PCP prescribed AND [2] triager unable to answer question    Negative: [1] Caller requesting a NON-URGENT new prescription or refill AND [2] triager unable to refill per unit policy    Negative: [1] Caller has medication question about med not prescribed by PCP AND [2] triager unable to answer question (e.g., compatibility with other med, storage)    Negative: Caller requesting a CONTROLLED substance prescription refill (e.g., narcotics, ADHD medicines)    Negative: Caller wants to use a complementary or alternative medicine    Negative: [1] Prescription prescribed recently is not at pharmacy AND [2] triager has access to patient's EMR AND [3] prescription is recorded in the EMR    Negative: [1] DOUBLE DOSE (an extra dose or lesser amount) of over-the-counter (OTC) drug AND [2] NO symptoms    Negative: [1] DOUBLE DOSE (an extra dose or lesser amount) of antibiotic drug AND [2] NO symptoms    Negative: Caller has medication question only, adult not sick, and triager answers question    Protocols used: MEDICATION QUESTION CALL-A-AH      "

## 2022-04-04 ENCOUNTER — E-VISIT (OUTPATIENT)
Dept: URGENT CARE | Facility: CLINIC | Age: 72
End: 2022-04-04
Payer: COMMERCIAL

## 2022-04-04 DIAGNOSIS — U07.1 INFECTION DUE TO 2019 NOVEL CORONAVIRUS: Primary | ICD-10-CM

## 2022-04-04 PROCEDURE — 99421 OL DIG E/M SVC 5-10 MIN: CPT | Performed by: FAMILY MEDICINE

## 2022-04-04 NOTE — PATIENT INSTRUCTIONS
You may get your booster now as long as you are feeling well after COVID.    You do not need to test any longer-- people may test + for up to 90 days.  A + test does not mean anything after treatment and quarantine.    A letter allowing you to travel has been placed on your MyChart.    Safe travels!  Fior Cintron MD

## 2022-04-04 NOTE — LETTER
PROOF OF COVID RECOVERY LETTER FOR TRAVEL    Date: 4/4/2022      Name: Dev Martinez                       YOB: 1950    Medical Record Number: [id number]    The [facility]    Patient is COVID vaccinated and boosted.  Patient tested positive for COVID on 3-. HE was treated with Paxlovid.    He is completely recovered and per CDC guidelines does not need any further COVID testing through 6-.          _________________________  Mckenzie Cintron MD

## 2022-04-15 ENCOUNTER — OFFICE VISIT (OUTPATIENT)
Dept: FAMILY MEDICINE | Facility: CLINIC | Age: 72
End: 2022-04-15
Payer: COMMERCIAL

## 2022-04-15 VITALS
WEIGHT: 258.12 LBS | SYSTOLIC BLOOD PRESSURE: 133 MMHG | OXYGEN SATURATION: 99 % | HEIGHT: 69 IN | HEART RATE: 62 BPM | BODY MASS INDEX: 38.23 KG/M2 | DIASTOLIC BLOOD PRESSURE: 71 MMHG | TEMPERATURE: 97.4 F

## 2022-04-15 DIAGNOSIS — R05.9 COUGH: ICD-10-CM

## 2022-04-15 DIAGNOSIS — J02.9 SORE THROAT: Primary | ICD-10-CM

## 2022-04-15 LAB
DEPRECATED S PYO AG THROAT QL EIA: NEGATIVE
GROUP A STREP BY PCR: NOT DETECTED

## 2022-04-15 PROCEDURE — 87651 STREP A DNA AMP PROBE: CPT | Performed by: FAMILY MEDICINE

## 2022-04-15 RX ORDER — CODEINE PHOSPHATE AND GUAIFENESIN 10; 100 MG/5ML; MG/5ML
1-2 SOLUTION ORAL EVERY 4 HOURS PRN
Qty: 180 ML | Refills: 0 | Status: SHIPPED | OUTPATIENT
Start: 2022-04-15 | End: 2022-09-01

## 2022-04-15 NOTE — NURSING NOTE
"72 year old  Chief Complaint   Patient presents with     Lingering Covid 19 Symptoms     Pt reports he has a hard time swallowing food, a runny nose and a cough.       Blood pressure (!) 160/89, pulse 62, temperature 97.4  F (36.3  C), height 1.742 m (5' 8.58\"), weight 117.1 kg (258 lb 1.9 oz), SpO2 99 %. Body mass index is 38.58 kg/m .  Patient Active Problem List   Diagnosis     Essential hypertension     Obstructive sleep apnea on CPAP     Medicare annual wellness visit, subsequent     Prediabetes     Left bundle branch block     Morbid obesity (H)     Bilateral sensorineural hearing loss     Acute gouty arthritis       Wt Readings from Last 2 Encounters:   04/15/22 117.1 kg (258 lb 1.9 oz)   09/02/21 113.9 kg (251 lb)     BP Readings from Last 3 Encounters:   04/15/22 (!) 160/89   09/02/21 135/75   07/26/21 (!) 153/85         Current Outpatient Medications   Medication     hydrochlorothiazide (HYDRODIURIL) 25 MG tablet     ketoconazole (NIZORAL) 2 % external cream     lisinopril (ZESTRIL) 5 MG tablet     omeprazole (PRILOSEC) 20 MG DR capsule     propranolol (INDERAL) 40 MG tablet     No current facility-administered medications for this visit.       Social History     Tobacco Use     Smoking status: Current Some Day Smoker     Years: 8.00     Types: Cigars     Start date: 6/15/2008     Smokeless tobacco: Never Used     Tobacco comment: smokes cigars   Substance Use Topics     Alcohol use: Yes     Comment: 7     Drug use: No       Health Maintenance Due   Topic Date Due     DIABETIC FOOT EXAM  Never done     ADVANCE CARE PLANNING  Never done     LUNG CANCER SCREENING  Never done     MICROALBUMIN  12/19/2020     EYE EXAM  02/20/2021     A1C  09/03/2021     PHQ-2 (once per calendar year)  01/01/2022     MEDICARE ANNUAL WELLNESS VISIT  03/11/2022     BMP  03/11/2022     LIPID  03/11/2022     FALL RISK ASSESSMENT  03/11/2022       No results found for: PAP      April 15, 2022 1:25 PM  "

## 2022-04-15 NOTE — PROGRESS NOTES
"  Assessment & Plan   Problem List Items Addressed This Visit    None     Visit Diagnoses     Sore throat    -  Primary    Relevant Orders    Streptococcus A Rapid Screen w/Reflex to PCR    Cough        Relevant Medications    guaiFENesin-codeine (ROBITUSSIN AC) 100-10 MG/5ML solution        Will check for strep and address as indicated. More likely this is an opportunistic viral URI following on COVID infection. Cough medicine to help with sleep and pain. Encouraged rest and hydration. Will follow up as indicated  .    26 minutes spent on the date of the encounter doing chart review, history and exam, documentation and further activities as noted.    Jj Ley MD  [facility]    Jessica Méndez is a 72 year old who presents for the following health issues    HPI   COVID lingering symptoms  - tested positive end of March  - got paxlovid the next day, felt better  - then symptoms started coming back  - started in his nose with congestion and drainage but then moved down into his throat  - trouble swallowing, cough  - feels \"like a chicken bone\" stuck in his throat  - denies fever, chills, nausea, body aches, fatigue      Review of Systems   Constitutional, HEENT, cardiovascular, pulmonary, gi and gu systems are negative, except as otherwise noted.      Objective    BP (!) 160/89   Pulse 62   Temp 97.4  F (36.3  C)   Ht 1.742 m (5' 8.58\")   Wt 117.1 kg (258 lb 1.9 oz)   SpO2 99%   BMI 38.58 kg/m    Body mass index is 38.58 kg/m .  Physical Exam   GENERAL: healthy, alert and no distress  EYES: Eyes grossly normal to inspection, PERRL and conjunctivae and sclerae normal  HENT: posterior oropharynx appears inflamed without patchy exudate   NECK: lymphadenopathy with increased tenderness to palpation more on RIGHT side  RESP: mild rales in upper lung fields; otherwise lungs clear to auscultation   CV: regular rate and rhythm, normal S1 S2, no S3 or S4, no murmur, click or rub, no peripheral edema and " peripheral pulses strong  ABDOMEN: soft, nontender, no hepatosplenomegaly, no masses and bowel sounds normal  MS: no gross musculoskeletal defects noted, no edema  SKIN: no suspicious lesions or rashes  NEURO: Normal strength and tone, mentation intact and speech normal  PSYCH: mentation appears normal, affect normal/bright

## 2022-05-07 ENCOUNTER — HEALTH MAINTENANCE LETTER (OUTPATIENT)
Age: 72
End: 2022-05-07

## 2022-05-10 ENCOUNTER — TELEPHONE (OUTPATIENT)
Dept: FAMILY MEDICINE | Facility: CLINIC | Age: 72
End: 2022-05-10
Payer: COMMERCIAL

## 2022-05-10 DIAGNOSIS — I10 ESSENTIAL HYPERTENSION: ICD-10-CM

## 2022-05-10 RX ORDER — LISINOPRIL 5 MG/1
5 TABLET ORAL DAILY
Qty: 90 TABLET | Refills: 0 | Status: SHIPPED | OUTPATIENT
Start: 2022-05-10 | End: 2022-09-01

## 2022-05-10 RX ORDER — HYDROCHLOROTHIAZIDE 25 MG/1
25 TABLET ORAL DAILY
Qty: 90 TABLET | Refills: 0 | Status: SHIPPED | OUTPATIENT
Start: 2022-05-10 | End: 2022-09-01

## 2022-05-10 RX ORDER — PROPRANOLOL HYDROCHLORIDE 40 MG/1
TABLET ORAL
Qty: 90 TABLET | Refills: 0 | Status: SHIPPED | OUTPATIENT
Start: 2022-05-10 | End: 2022-09-01

## 2022-05-10 NOTE — TELEPHONE ENCOUNTER
Prior Authorization Retail Medication Request    Medication/Dose: Omeprazole 20 mg  ICD code (if different than what is on RX):  same  Previously Tried and Failed:    Rationale:      Insurance Name:  same  Insurance ID:  same      Pharmacy Information (if different than what is on RX)  [facility]  Phone:  [phone]  Ewelina Crandall RN  [facility]

## 2022-05-10 NOTE — TELEPHONE ENCOUNTER
Central Prior Authorization Team   Phone: [phone]      PA Initiation    Medication: Omeprazole 20 mg  Insurance Company: [employer] - Phone [phone] Fax [phone]  [facility] [id number] [place] [facility] [address]  Filling Pharmacy Phone: [phone]  Filling Pharmacy Fax:    Start Date: 5/10/2022

## 2022-05-10 NOTE — TELEPHONE ENCOUNTER
Last visit 12/23/21, future visit 7/22/22    Medication is being filled for 1 time refill only due to:  Patient needs labs needs BMP at upcoming July appt.   Ewelina Crandall RN  [facility]

## 2022-05-11 NOTE — TELEPHONE ENCOUNTER
Prior Authorization Approval    Authorization Effective Date: 5/10/2022  Authorization Expiration Date: 5/10/2023  Medication: Omeprazole 20 mg-APPROVED  Approved Dose/Quantity:   Reference #:     Insurance Company: [employer] - [employer] [phone] Fax [phone]  Expected CoPay:       CoPay Card Available:      Foundation Assistance Needed:    Which Pharmacy is filling the prescription (Not needed for infusion/clinic administered): [facility] [id number] [place] [address]  Pharmacy Notified: Yes  Patient Notified: No

## 2022-06-03 DIAGNOSIS — K21.9 GASTROESOPHAGEAL REFLUX DISEASE WITHOUT ESOPHAGITIS: ICD-10-CM

## 2022-06-03 DIAGNOSIS — G47.33 OBSTRUCTIVE SLEEP APNEA (ADULT) (PEDIATRIC): Primary | ICD-10-CM

## 2022-06-03 NOTE — TELEPHONE ENCOUNTER
Last office visit was on 04/15/2022, next visit is on 07/22/2022 with Dr Pisano.  Medication last refilled on 05/18/2021 with 90 capsules and 3 refills.  Prescription approved per [facility] Refill Protocol.  Ewelina Crandall RN  [facility]

## 2022-06-08 NOTE — TELEPHONE ENCOUNTER
Received refill requests for hydrochlorothiazide, lisinopril and propranolol from [facility] - pt recently received 90 day supply of meds 5/10/22. Pt has upcoming appt with Dr Pisano in July.  Called pharmacy - holding on refills for now.  Ewelina Crandall RN  [facility]

## 2022-07-28 ENCOUNTER — HOSPITAL ENCOUNTER (OUTPATIENT)
Facility: CLINIC | Age: 72
Discharge: HOME | End: 2022-07-28
Admitting: FAMILY MEDICINE
Payer: COMMERCIAL

## 2022-07-28 ENCOUNTER — OFFICE VISIT (OUTPATIENT)
Dept: FAMILY MEDICINE | Facility: CLINIC | Age: 72
End: 2022-07-28

## 2022-07-28 VITALS
OXYGEN SATURATION: 98 % | BODY MASS INDEX: 37.62 KG/M2 | DIASTOLIC BLOOD PRESSURE: 77 MMHG | WEIGHT: 254 LBS | RESPIRATION RATE: 15 BRPM | HEART RATE: 51 BPM | TEMPERATURE: 97.6 F | HEIGHT: 69 IN | SYSTOLIC BLOOD PRESSURE: 124 MMHG

## 2022-07-28 DIAGNOSIS — Z12.5 SCREENING FOR PROSTATE CANCER: ICD-10-CM

## 2022-07-28 DIAGNOSIS — Z13.220 SCREENING FOR LIPID DISORDERS: ICD-10-CM

## 2022-07-28 DIAGNOSIS — G25.81 RESTLESS LEGS: ICD-10-CM

## 2022-07-28 DIAGNOSIS — R21 RASH: ICD-10-CM

## 2022-07-28 DIAGNOSIS — E11.9 TYPE 2 DIABETES MELLITUS WITHOUT COMPLICATION, WITHOUT LONG-TERM CURRENT USE OF INSULIN (H): ICD-10-CM

## 2022-07-28 DIAGNOSIS — Z97.4 WEARS HEARING AID IN BOTH EARS: ICD-10-CM

## 2022-07-28 DIAGNOSIS — R21 FACIAL RASH: ICD-10-CM

## 2022-07-28 DIAGNOSIS — Z00.00 MEDICARE ANNUAL WELLNESS VISIT, SUBSEQUENT: Primary | ICD-10-CM

## 2022-07-28 LAB
ANION GAP SERPL CALCULATED.3IONS-SCNC: 10 MMOL/L (ref 7–15)
BUN SERPL-MCNC: 14.6 MG/DL (ref 8–23)
CALCIUM SERPL-MCNC: 9.4 MG/DL (ref 8.8–10.2)
CHLORIDE SERPL-SCNC: 98 MMOL/L (ref 98–107)
CHOLEST SERPL-MCNC: 181 MG/DL
CREAT SERPL-MCNC: 0.95 MG/DL (ref 0.67–1.17)
CREAT UR-MCNC: 203 MG/DL
DEPRECATED HCO3 PLAS-SCNC: 28 MMOL/L (ref 22–29)
FERRITIN SERPL-MCNC: 439 NG/ML (ref 31–409)
GFR SERPL CREATININE-BSD FRML MDRD: 86 ML/MIN/1.73M2
GLUCOSE SERPL-MCNC: 204 MG/DL (ref 70–99)
HBA1C MFR BLD: 8.6 % (ref 0–5.6)
HDLC SERPL-MCNC: 40 MG/DL
LDLC SERPL CALC-MCNC: 114 MG/DL
MICROALBUMIN UR-MCNC: <12 MG/L
MICROALBUMIN/CREAT UR: NORMAL MG/G{CREAT}
NONHDLC SERPL-MCNC: 141 MG/DL
POTASSIUM SERPL-SCNC: 3.7 MMOL/L (ref 3.4–5.3)
PSA SERPL-MCNC: 1.28 NG/ML (ref 0–6.5)
SODIUM SERPL-SCNC: 136 MMOL/L (ref 136–145)
TRIGL SERPL-MCNC: 137 MG/DL

## 2022-07-28 PROCEDURE — 82728 ASSAY OF FERRITIN: CPT | Performed by: FAMILY MEDICINE

## 2022-07-28 PROCEDURE — 80061 LIPID PANEL: CPT | Performed by: FAMILY MEDICINE

## 2022-07-28 PROCEDURE — G0103 PSA SCREENING: HCPCS | Performed by: FAMILY MEDICINE

## 2022-07-28 PROCEDURE — 82043 UR ALBUMIN QUANTITATIVE: CPT | Performed by: FAMILY MEDICINE

## 2022-07-28 PROCEDURE — 80048 BASIC METABOLIC PNL TOTAL CA: CPT | Performed by: FAMILY MEDICINE

## 2022-07-28 RX ORDER — HYDROCORTISONE VALERATE CREAM 2 MG/G
CREAM TOPICAL
Qty: 30 G | Refills: 1 | Status: SHIPPED | OUTPATIENT
Start: 2022-07-28 | End: 2024-09-05

## 2022-07-28 ASSESSMENT — ENCOUNTER SYMPTOMS
SHORTNESS OF BREATH: 0
NAUSEA: 0
NERVOUS/ANXIOUS: 0
PALPITATIONS: 0
DIZZINESS: 0
HEMATURIA: 0
FREQUENCY: 0
WEAKNESS: 0
DYSURIA: 0
HEADACHES: 0
SORE THROAT: 0
HEMATOCHEZIA: 0
MYALGIAS: 0
EYE PAIN: 0
CONSTIPATION: 0
COUGH: 0
FEVER: 0
CHILLS: 0
ARTHRALGIAS: 0
HEARTBURN: 0
ABDOMINAL PAIN: 0
JOINT SWELLING: 0
DIARRHEA: 0
PARESTHESIAS: 0

## 2022-07-28 ASSESSMENT — ACTIVITIES OF DAILY LIVING (ADL): CURRENT_FUNCTION: NO ASSISTANCE NEEDED

## 2022-07-28 NOTE — NURSING NOTE
"72 year old  Chief Complaint   Patient presents with     Physical     Derm Problem     Rash between legs     Sleep Problem     Not sleeping well       Blood pressure 124/77, pulse 51, temperature 97.6  F (36.4  C), temperature source Skin, resp. rate 15, height 1.74 m (5' 8.5\"), weight 115.2 kg (254 lb), SpO2 98 %. Body mass index is 38.06 kg/m .  Patient Active Problem List   Diagnosis     Essential hypertension     Obstructive sleep apnea on CPAP     Medicare annual wellness visit, subsequent     Prediabetes     Left bundle branch block     Morbid obesity (H)     Bilateral sensorineural hearing loss     Acute gouty arthritis       Wt Readings from Last 2 Encounters:   07/28/22 115.2 kg (254 lb)   04/15/22 117.1 kg (258 lb 1.9 oz)     BP Readings from Last 3 Encounters:   07/28/22 124/77   04/15/22 133/71   09/02/21 135/75         Current Outpatient Medications   Medication     guaiFENesin-codeine (ROBITUSSIN AC) 100-10 MG/5ML solution     hydrochlorothiazide (HYDRODIURIL) 25 MG tablet     ketoconazole (NIZORAL) 2 % external cream     lisinopril (ZESTRIL) 5 MG tablet     omeprazole (PRILOSEC) 20 MG DR capsule     propranolol (INDERAL) 40 MG tablet     No current facility-administered medications for this visit.       Social History     Tobacco Use     Smoking status: Current Some Day Smoker     Years: 8.00     Types: Cigars     Start date: 6/15/2008     Smokeless tobacco: Never Used     Tobacco comment: smokes cigars   Substance Use Topics     Alcohol use: Yes     Comment: 1-2 drinks per day     Drug use: No       Health Maintenance Due   Topic Date Due     DIABETIC FOOT EXAM  Never done     ADVANCE CARE PLANNING  Never done     LUNG CANCER SCREENING  Never done     MICROALBUMIN  12/19/2020     EYE EXAM  02/20/2021     A1C  09/03/2021     MEDICARE ANNUAL WELLNESS VISIT  03/11/2022     BMP  03/11/2022     LIPID  03/11/2022       No results found for: PAP      July 28, 2022 9:11 AM    "

## 2022-07-28 NOTE — PATIENT INSTRUCTIONS
Patient Education   Personalized Prevention Plan  You are due for the preventive services outlined below.  Your care team is available to assist you in scheduling these services.  If you have already completed any of these items, please share that information with your care team to update in your medical record.  Health Maintenance Due   Topic Date Due     Discuss Advance Care Planning  Never done     LUNG CANCER SCREENING  Never done     Kidney Microalbumin Urine Test  12/19/2020     Eye Exam  02/20/2021     A1C Lab  09/03/2021     Basic Metabolic Panel  03/11/2022     Cholesterol Lab  03/11/2022

## 2022-07-28 NOTE — PROGRESS NOTES
"CHIEF COMPLAINT: Medicare Annual Wellness Exam, subsequent      HISTORY OF PRESENT ILLNESS:  Dev Martinez is a 72 year old male who presents for an annual wellness visit.  Overall, he is doing well. He wears glasses and eye care is not up to date. His dental care is yes up to date. He wears hearing aids, bilaterally and is happy with the results.  His BP is 124/77 today. Body mass index is 38.06 kg/m . From an immunization standpoint, he is up to date. He has received all available COVID vaccinations and his influenza immunization this season. Tdap due 2031.    His last colonoscopy was completed on 6/22/16, repeat due 2026. He is due for prostate cancer screening. Does not meet criteria for lung cancer screening via CT scan.    Today, Dev reports a rash between his legs in the groin area. This rash has been \"off and on for a while\", but has recently recurred. He has been using hydrocortisone cream and occasionally ketoconazole 2% cream for treatment.    Dev recently retired on 6/3/22 and thought that he would start sleeping better, but he hasn't. He is able to fall asleep, but has difficulty staying asleep throughout the night. He reports getting up around 1-2 times per night and feeling \"wide awake\". Endorses looking at his phone before sleeping. Mentions that his muscles sometimes feel \"tight\" when trying to sleep, especially in his back and hands. He often feels like he needs to shake out his hands to get rid of the sensation. His sister has a history of restless leg syndrome, is on ropinirol which is working well. He is unsure if he would like to try a medication. Denies nocturia. Recently, he has been trying to get back to sleep by reading or going downstairs and working on his computer.     Dev has a history of prediabetes. His last A1c was 6.3 on 6/3/21. He takes lisinopril 5 mg daily, hydrochlorothiazide 25 mg daily, and propranolol 40 mg (1/2 tablet BID), for treatment of HTN. He is not currently " "taking a daily Asprin. He smokes cigars regularly, approximately 1/day. No hyperlipidemia.     MEDICARE PREVENTATIVE VISIT:  1. No problems with ADLs  2. No falls  3. No depression  4. Not concerned about memory loss, Mini-cog = 5/5     FAMILY, SOCIAL AND PAST SURGICAL HISTORIES: Updated      MEDICATIONS:   Carefully Reviewed      REVIEW OF SYSTEMS:  10-point review of systems negative unless otherwise stated in the HPI.       OBJECTIVE:    EXAM:    GENERAL: Dev is in no distress.  Affect is upbeat.   Alert and oriented x3  /77 (BP Location: Left arm, Patient Position: Sitting, Cuff Size: Adult Large)   Pulse 51   Temp 97.6  F (36.4  C) (Skin)   Resp 15   Ht 1.74 m (5' 8.5\")   Wt 115.2 kg (254 lb)   SpO2 98%   BMI 38.06 kg/m    HEENT:  Head is normocephalic, atraumatic. Ears clear bilaterally. No pain with palpation of the frontal or maxillary sinuses.  Eyes are grossly normal.  Nose and mouth masked. Neck is supple without adenopathy or thyromegaly.  No carotid bruits are heard, no JVD.   BACK:  Smooth and straight.  No pain to percussion.  No CVA tenderness.   LUNGS:  Clear to auscultation bilaterally.   HEART:  Regular rate and rhythm without murmur.   EXTREMITIES:  Ankles free of any edema.   SKIN:  Warm and dry. Red rash in groin; classic tinea cruris.      LABORATORY DATA: No results found for any visits on 07/28/22.      ASSESSMENT AND PLAN:   1. Medicare Annual Wellness Exam, subsequent: Up to date with healthcare maintenance strategies.  2. Prediabetes: Albumin Random Urine Quantitative with Creat Ratio, VT foot exam no charge, Hemoglobin A1c completed today, results pending. NOTE: glucose was 200 and A1c was 8.6%.  He now has T2D.  Will meet to discuss starting metformin.  3. Screening for lipid disorders: Basic metabolic panel, Lipid Profile completed today. Dev is fasting.   4. Restless legs: Ferritin completed today, results pending.  5. Screening for prostate cancer: Prostate spec " "antigen screen completed today, results pending.  6. Wears hearing aid in both ears: Dev is satisfied with the results of his hearing aids.   7. Rash (tinea cruris): hydrocortisone 0.2 % external cream ordered today, apply along with ketoconazole.  8. Weak urinary flow: PSA pending.  9. Follow up in 1 year or call if there are any questions or concerns.    I, Thelma Watson, am serving as a scribe to document services personally performed by Dr. Behzad Pisano, based on data collection and the provider's statements to me. Dr. Pisano has reviewed, edited, and approved the above note.     I, Dr. Behzad Pisano, have interviewed and examined this patient, and I have thoroughly reviewed and edited this note and agree with its contents.      Answers for HPI/ROS submitted by the patient on 7/28/2022  In general, how would you rate your overall physical health?: good  Frequency of exercise:: 2-3 days/week  Do you usually eat at least 4 servings of fruit and vegetables a day, include whole grains & fiber, and avoid regularly eating high fat or \"junk\" foods? : Yes  Taking medications regularly:: Yes  Medication side effects:: None  Activities of Daily Living: no assistance needed  Home safety: lack of grab bars in the bathroom  Hearing Impairment:: no hearing concerns  In the past 6 months, have you been bothered by leaking of urine?: No  abdominal pain: No  Blood in stool: No  Blood in urine: No  chest pain: No  chills: No  congestion: No  constipation: No  cough: No  diarrhea: No  dizziness: No  ear pain: No  eye pain: No  nervous/anxious: No  fever: No  frequency: No  genital sores: No  headaches: No  hearing loss: No  heartburn: No  arthralgias: No  joint swelling: No  peripheral edema: No  mood changes: No  myalgias: No  nausea: No  dysuria: No  palpitations: No  Skin sensation changes: No  sore throat: No  urgency: No  rash: Yes  shortness of breath: No  visual disturbance: No  weakness: No  impotence: Yes  penile " discharge: No  In general, how would you rate your overall mental or emotional health?: good  Additional concerns today:: No  Duration of exercise:: 45-60 minutes

## 2022-07-30 PROBLEM — E11.9 DIABETES MELLITUS, TYPE 2 (H): Status: ACTIVE | Noted: 2022-07-30

## 2022-08-04 NOTE — PROGRESS NOTES
"Dev is a 72 year old who is being evaluated via a billable video visit.    Can you please confirm what state you are currently located in? [place]     How would you like to obtain your AVS? Gui  If the video visit is dropped, the invitation should be resent by: Send to e-mail at: [email or web address]  Will anyone else be joining your video visit? No    Video-Visit Details    Video Start Time: 3:05 pm    Type of service:  Video Visit    Video End Time: 3:30 pm    Originating Location (pt. Location): Home    Distant Location (provider location):  [facility]     Platform used for Video Visit: [email or web address]      VIDEO VISIT     CHIEF COMPLAINT:  Concerns about restless arm and elevated blood sugars.    HISTORY OF PRESENT ILLNESS:  Dev is a 72 year old who is having a number of sleep issues.  He feels sleepy at night, but is not sleeping well.  He can go to sleep for about an hour and a half, and then he wakes up, and he is aware that his arms are very restless.  Last night, for example, he was not able to really go to sleep until about 5:00 this morning.  His ferritin has been checked, and it is fine.  In fact, it is just a little bit elevated.  He can feel a sense of \"restlessness\" in his back, but importantly, he does not feel it in his legs.  He has read about restless leg syndrome and does not feel that it is that, although his arms are perhaps experiencing that sense of motion and movement. When he sits on the edge of his bed getting ready to go to bed at night, he feels really tired as though he will fall sleep.  However, once he is in a supine position, he does not feel very tired, and he will toss and turn for quite a while.    He found recently that when his wife rubbed his back quite hard, that helped a lot.  When he is moving his arms, he feels like he is \"shaking off energy.\"    I offered him, and he is open to trying that.  See Assessment/Plan below for details.    The other concern is that he has had " prediabetes for a while. However, most recent A1c was 8.6%, indicating that he truly has diabetes.  Since then, he has really cut down on all of the bread that he is eating.  He has decreased his alcohol consumption.  He is walking virtually one hour every day.  This is up from walking approximately one time once a week.  He is really reluctant to start a medication like metformin.  He would like to see what he can do over the next 3 months to get that number down.  He has not noted any weight loss yet and knows he could and should use some.  Therefore, I am willing to simply recheck an A1c in about 3 months and see where we are at.    ACTIVE MEDICAL PROBLEMS:  Reviewed.    CURRENT MEDICATIONS:  Reviewed.    OBJECTIVE:  Dev is in no distress on this video visit.  He is breathing comfortably.  Affect upbeat.  No diaphoresis.  Alert and oriented x3.  No coughing or shortness of breath noted.    ASSESSMENT/PLAN:    1.  Restless arm phenomenon.  Very similar to restless legs.  Ferritin is not low.  No evidence of iron deficiency.  Given how disrupting this is and how much of this is decreasing his quality of life, I think it would be reasonable for him to try ropinirole (Requip) 0.5 mg at bedtime.  We can increase this slowly over time.  We will start with 30 tablets and 1 refill.  One at bedtime.  He will report back and let me know how he is doing with that.  2.  Type 2 diabetes, changing from prediabetes from what it had been previously.  However, he has already made some significant dietary and health changes.  We will see what that does.  If A1c is still above 8% when we check again in November, we will start him on metformin, extended release, 500 mg once daily with one of his 2 major meals.  We will follow through accordingly.

## 2022-08-10 ENCOUNTER — VIRTUAL VISIT (OUTPATIENT)
Dept: FAMILY MEDICINE | Facility: CLINIC | Age: 72
End: 2022-08-10
Payer: COMMERCIAL

## 2022-08-10 DIAGNOSIS — E11.9 TYPE 2 DIABETES MELLITUS WITHOUT COMPLICATION, WITHOUT LONG-TERM CURRENT USE OF INSULIN (H): ICD-10-CM

## 2022-08-10 DIAGNOSIS — R45.1 RESTLESS ARM: Primary | ICD-10-CM

## 2022-08-10 DIAGNOSIS — G47.09 SECONDARY INSOMNIA: ICD-10-CM

## 2022-08-10 RX ORDER — ROPINIROLE 0.5 MG/1
TABLET, FILM COATED ORAL
Qty: 30 TABLET | Refills: 1 | Status: SHIPPED | OUTPATIENT
Start: 2022-08-10 | End: 2022-09-29

## 2022-08-10 NOTE — NURSING NOTE
72 year old  Chief Complaint   Patient presents with     Diabetes     Sleep Problem     Restless arm syndrome, unable to sleep at night       There were no vitals taken for this visit. There is no height or weight on file to calculate BMI.  Patient Active Problem List   Diagnosis     Essential hypertension     Obstructive sleep apnea on CPAP     Medicare annual wellness visit, subsequent     Left bundle branch block     Morbid obesity (H)     Bilateral sensorineural hearing loss     Acute gouty arthritis     Wears hearing aid in both ears     Diabetes mellitus, type 2 (H)       Wt Readings from Last 2 Encounters:   07/28/22 115.2 kg (254 lb)   04/15/22 117.1 kg (258 lb 1.9 oz)     BP Readings from Last 3 Encounters:   07/28/22 124/77   04/15/22 133/71   09/02/21 135/75         Current Outpatient Medications   Medication     guaiFENesin-codeine (ROBITUSSIN AC) 100-10 MG/5ML solution     hydrochlorothiazide (HYDRODIURIL) 25 MG tablet     hydrocortisone (WESTCORT) 0.2 % external cream     ketoconazole (NIZORAL) 2 % external cream     lisinopril (ZESTRIL) 5 MG tablet     omeprazole (PRILOSEC) 20 MG DR capsule     propranolol (INDERAL) 40 MG tablet     No current facility-administered medications for this visit.       Social History     Tobacco Use     Smoking status: Current Some Day Smoker     Years: 8.00     Types: Cigars     Start date: 6/15/2008     Smokeless tobacco: Never Used     Tobacco comment: smokes cigars   Substance Use Topics     Alcohol use: Yes     Comment: 1-2 drinks per day     Drug use: No       Health Maintenance Due   Topic Date Due     ADVANCE CARE PLANNING  Never done     LUNG CANCER SCREENING  Never done     EYE EXAM  02/20/2021       No results found for: PAP      August 10, 2022 2:45 PM

## 2022-08-14 PROBLEM — G47.09 SECONDARY INSOMNIA: Status: ACTIVE | Noted: 2022-08-14

## 2022-08-14 PROBLEM — R73.03 PRE-DIABETES: Status: ACTIVE | Noted: 2022-08-14

## 2022-08-14 PROBLEM — R45.1: Status: ACTIVE | Noted: 2022-08-14

## 2022-09-01 DIAGNOSIS — R45.1 RESTLESS ARM: ICD-10-CM

## 2022-09-01 DIAGNOSIS — I10 ESSENTIAL HYPERTENSION: ICD-10-CM

## 2022-09-01 RX ORDER — HYDROCHLOROTHIAZIDE 25 MG/1
25 TABLET ORAL DAILY
Qty: 90 TABLET | Refills: 1 | Status: SHIPPED | OUTPATIENT
Start: 2022-09-01 | End: 2023-02-28

## 2022-09-01 RX ORDER — ROPINIROLE 0.5 MG/1
TABLET, FILM COATED ORAL
Qty: 30 TABLET | Refills: 1 | Status: CANCELLED | OUTPATIENT
Start: 2022-09-01

## 2022-09-01 RX ORDER — PROPRANOLOL HYDROCHLORIDE 40 MG/1
TABLET ORAL
Qty: 90 TABLET | Refills: 1 | Status: SHIPPED | OUTPATIENT
Start: 2022-09-01 | End: 2022-12-20

## 2022-09-01 RX ORDER — LISINOPRIL 5 MG/1
5 TABLET ORAL DAILY
Qty: 90 TABLET | Refills: 1 | Status: SHIPPED | OUTPATIENT
Start: 2022-09-01 | End: 2023-02-28

## 2022-09-01 NOTE — TELEPHONE ENCOUNTER
Propranolol (Inderal) 40 mg, Lisinopril (Zestril) 5 mg + Hydrochlorothiazide (Hydrodiuril) 25 mg    Last Office Visit: 7/28/22  Future [facility] Appointments: None  Medication last refilled:     5/10/22 #90 with 0 refill(s)-Hydrochlorothiazide   5/10/22 #90 with 0 refill(s)-Lisinopril  5/10/22 #90 with 0 refill(s)-Propranolol  8/10/22 #30 with 1 refill(s)-Ropinirole (Will not fill at this time as he has a refill left)    Vital Signs 5/14/2021 4/15/2022 7/28/2022   Systolic 149 133 124   Diastolic 89 71 77     Required labs per protocol:    LAB REF RANGE 3/11/21 7/28/22   Creatinine 0.8-1.25 mg/dL 1.1 0.95   Potassium 3.4-5.3 mmol/L 3.7 3.7   Sodium 137.3-146.3 mmol/L 140 136     Prescription approved per [facility] Refill Protocol.    Meli Grubbs, VALEN, RN, CCM

## 2022-09-27 DIAGNOSIS — R45.1 RESTLESS ARM: ICD-10-CM

## 2022-09-27 NOTE — TELEPHONE ENCOUNTER
Last visit was virtually on 08/10/2022, no future visits scheduled.  Medication last refilled on 08/10/2022 with 30 tablets and 1 refill.

## 2022-09-29 RX ORDER — ROPINIROLE 0.5 MG/1
0.5 TABLET, FILM COATED ORAL AT BEDTIME
Qty: 90 TABLET | Refills: 1 | Status: SHIPPED | OUTPATIENT
Start: 2022-09-29 | End: 2023-03-20

## 2022-09-29 NOTE — TELEPHONE ENCOUNTER
Medication requested: rOPINIRole (REQUIP) 0.5 MG tablet  Last office visit: 7/28/22  [facility] appointments: none  Medication last refilled: 8/10/22; 30 + 1 refill  Last qualifying labs:   Component      Latest Ref Rng & Units 7/28/2022   Creatinine      0.67 - 1.17 mg/dL 0.95     Routing refill request to provider for review/approval because:  Labs not current:  CBC and ALT    Tommy HAMILTON, RN  09/29/22 4:14 PM

## 2022-10-24 ENCOUNTER — OFFICE VISIT (OUTPATIENT)
Dept: FAMILY MEDICINE | Facility: CLINIC | Age: 72
End: 2022-10-24
Payer: COMMERCIAL

## 2022-10-24 VITALS
SYSTOLIC BLOOD PRESSURE: 154 MMHG | TEMPERATURE: 97.7 F | OXYGEN SATURATION: 98 % | BODY MASS INDEX: 38.96 KG/M2 | RESPIRATION RATE: 15 BRPM | DIASTOLIC BLOOD PRESSURE: 79 MMHG | WEIGHT: 260 LBS | HEART RATE: 54 BPM

## 2022-10-24 DIAGNOSIS — H61.21 IMPACTED CERUMEN OF RIGHT EAR: Primary | ICD-10-CM

## 2022-10-24 DIAGNOSIS — H91.91 DECREASED HEARING OF RIGHT EAR: ICD-10-CM

## 2022-10-24 DIAGNOSIS — J34.89 INFECTED LESION IN NOSE: ICD-10-CM

## 2022-10-24 DIAGNOSIS — B35.6 TINEA CRURIS: ICD-10-CM

## 2022-10-24 RX ORDER — KETOCONAZOLE 20 MG/G
CREAM TOPICAL 2 TIMES DAILY
Qty: 30 G | Refills: 1 | Status: SHIPPED | OUTPATIENT
Start: 2022-10-24 | End: 2024-09-05

## 2022-10-24 RX ORDER — MUPIROCIN 20 MG/G
OINTMENT TOPICAL 3 TIMES DAILY
Qty: 15 G | Refills: 0 | Status: SHIPPED | OUTPATIENT
Start: 2022-10-24 | End: 2023-01-23

## 2022-10-24 NOTE — PROGRESS NOTES
CHIEF COMPLAINT: Right Ear Plugged      HISTORY OF PRESENT ILLNESS:  Dev Martinez is a 72 year old male  with a history of bilateral sensorineural hearing loss (and wears hearing aids) who presents for the above.    Dev has had a plugged right ear for the past few weeks. It has felt distinctly different from his left ear and there has also been a difference in his hearing between the two ears. He notes that he has noticed more cerumen in his right ear since he began wearing hearing aids. He tried ear drops and Q-tips without improvement.    Dev has a sore in his right nostril. Denies epistaxis.      MEDICATIONS:   Carefully Reviewed      REVIEW OF SYSTEMS:  10-point review of systems negative unless otherwise stated in the HPI.       OBJECTIVE:    EXAM:  GENERAL: He is in no distress.  Affect is upbeat.   Alert and oriented x3  BP (!) 154/79 (BP Location: Right arm, Patient Position: Sitting, Cuff Size: Adult Large)   Pulse 54   Temp 97.7  F (36.5  C) (Skin)   Resp 15   Wt 117.9 kg (260 lb)   SpO2 98%   BMI 38.96 kg/m    HEENT:  Head is normocephalic, atraumatic. Nose: no obvious lesion in R nare. Right ear canal reveals complete obstruction with moist-appearing yellowish cerumen. No cerumen in left ear canal. Post-ear wash x 2, debris/cerumen still remains without improvement in hearing.       LABORATORY DATA: N/A      ASSESSMENT AND PLAN:   1. Impacted Cerumen of Right Ear: Ear wash performed today, success unclear. If there's no significant improvement in ~1 week, contact the clinic and we will refer to ENT for evaluation and treatment (ie, cerumen removal under the microscope)  2. Possible infected lesion in nose (overgrowth of S aureus?: Apply Bactroban ointment to area nightly for 7-14 days, until lesion resolves.  3. H/o Tinea Cruris: ketoconazole refilled.    Lorena STANLEY, am serving as a scribe to document services personally performed by Dr. Behzad Pisano, based on data collection and the  provider's statements to me. Dr. Pisano has reviewed, edited, and approved the above note.     I, Dr. Behzad Pisano, have interviewed and examined this patient, and I have thoroughly reviewed and edited this note and agree with its contents.

## 2022-10-24 NOTE — NURSING NOTE
72 year old  Chief Complaint   Patient presents with     Ear Problem     Right ear plugged x few week        Blood pressure (!) 154/79, pulse 54, temperature 97.7  F (36.5  C), temperature source Skin, resp. rate 15, weight 117.9 kg (260 lb), SpO2 98 %. Body mass index is 38.96 kg/m .  Patient Active Problem List   Diagnosis     Essential hypertension     Obstructive sleep apnea on CPAP     Medicare annual wellness visit, subsequent     Left bundle branch block     Morbid obesity (H)     Bilateral sensorineural hearing loss     Acute gouty arthritis     Wears hearing aid in both ears     Type 2 diabetes mellitus without complication, without long-term current use of insulin (H)     Restless arm     Secondary insomnia     Pre-diabetes       Wt Readings from Last 2 Encounters:   10/24/22 117.9 kg (260 lb)   07/28/22 115.2 kg (254 lb)     BP Readings from Last 3 Encounters:   10/24/22 (!) 154/79   07/28/22 124/77   04/15/22 133/71         Current Outpatient Medications   Medication     hydrochlorothiazide (HYDRODIURIL) 25 MG tablet     hydrocortisone (WESTCORT) 0.2 % external cream     ketoconazole (NIZORAL) 2 % external cream     lisinopril (ZESTRIL) 5 MG tablet     omeprazole (PRILOSEC) 20 MG DR capsule     propranolol (INDERAL) 40 MG tablet     rOPINIRole (REQUIP) 0.5 MG tablet     No current facility-administered medications for this visit.       Social History     Tobacco Use     Smoking status: Some Days     Types: Cigars     Start date: 6/15/2008     Smokeless tobacco: Never     Tobacco comments:     smokes cigars   Substance Use Topics     Alcohol use: Yes     Comment: 1-2 drinks per day     Drug use: No       Health Maintenance Due   Topic Date Due     ADVANCE CARE PLANNING  Never done     LUNG CANCER SCREENING  Never done     EYE EXAM  02/20/2021     A1C  10/28/2022       No results found for: PAP      October 24, 2022 9:26 AM

## 2022-10-24 NOTE — NURSING NOTE
Ceruminosis is noted in the right ear.  Wax is unsuccessfully removed by syringing and manual debridement. Instructions for home care to prevent wax buildup are given. Plan to follow up with ENT in 1 week if not resolved.      This service provided today was under the supervising provider of the day Dr. Behzad Pisano, who was available if needed.    Mikey Rosales, EMT

## 2022-11-14 ENCOUNTER — OFFICE VISIT (OUTPATIENT)
Dept: OTOLARYNGOLOGY | Facility: CLINIC | Age: 72
End: 2022-11-14
Payer: COMMERCIAL

## 2022-11-14 VITALS
DIASTOLIC BLOOD PRESSURE: 79 MMHG | HEIGHT: 69 IN | TEMPERATURE: 96.5 F | RESPIRATION RATE: 12 BRPM | HEART RATE: 63 BPM | BODY MASS INDEX: 38.51 KG/M2 | OXYGEN SATURATION: 98 % | SYSTOLIC BLOOD PRESSURE: 151 MMHG | WEIGHT: 260 LBS

## 2022-11-14 DIAGNOSIS — H61.21 IMPACTED CERUMEN OF RIGHT EAR: Primary | ICD-10-CM

## 2022-11-14 PROCEDURE — 69200 CLEAR OUTER EAR CANAL: CPT | Mod: RT | Performed by: OTOLARYNGOLOGY

## 2022-11-14 PROCEDURE — 99212 OFFICE O/P EST SF 10 MIN: CPT | Mod: 25 | Performed by: OTOLARYNGOLOGY

## 2022-11-14 NOTE — LETTER
11/14/2022       RE: Dev Martinez  [address]  [facility] [address]     Dear Colleague,    Thank you for referring your patient, Dev Martinez, to the [facility] [place] at [facility]. Please see a copy of my visit note below.    HISTORY OF PRESENT ILLNESS: Dev is back to see us today.  He has been feeling that his ears are plugged up, particularly on his right side.  He was scheduled for an audiogram, but had moved his appointment up and unfortunately we do not have the availability today for audiogram and he will have to come back in a couple of weeks for an audiogram.  He has been using hearing aids with good results.  He was seen by his primary who tried to wash out his right ear, but that was not successful.    PHYSICAL EXAMINATION:  On examination, the patient is in no distress, alert and appropriate.  Breathing without difficulty or stridor.  Eyes are anicteric.  Skin of the head and neck appears normal.  Examination of the right ear shows obstructive cerumen, which is removed under the microscope revealing normal tympanic membrane.  Examination of the left ear shows a hearing aid dome, which is obstructing the tympanic membrane, which is removed under the microscope.  The patient tolerated this well.    PLAN:  At this point, plan is to follow up with his audiogram as previously scheduled and see him after that.          Again, thank you for allowing me to participate in the care of your patient.      Sincerely,    Robert Dong MD

## 2022-11-14 NOTE — PROGRESS NOTES
HISTORY OF PRESENT ILLNESS: Dev is back to see us today.  He has been feeling that his ears are plugged up, particularly on his right side.  He was scheduled for an audiogram, but had moved his appointment up and unfortunately we do not have the availability today for audiogram and he will have to come back in a couple of weeks for an audiogram.  He has been using hearing aids with good results.  He was seen by his primary who tried to wash out his right ear, but that was not successful.    PHYSICAL EXAMINATION:  On examination, the patient is in no distress, alert and appropriate.  Breathing without difficulty or stridor.  Eyes are anicteric.  Skin of the head and neck appears normal.  Examination of the right ear shows obstructive cerumen, which is removed under the microscope revealing normal tympanic membrane.  Examination of the left ear shows a hearing aid dome, which is obstructing the tympanic membrane, which is removed under the microscope.  The patient tolerated this well.    PLAN:  At this point, plan is to follow up with his audiogram as previously scheduled and see him after that.

## 2022-11-18 ENCOUNTER — TELEPHONE (OUTPATIENT)
Dept: FAMILY MEDICINE | Facility: CLINIC | Age: 72
End: 2022-11-18

## 2022-11-18 NOTE — TELEPHONE ENCOUNTER
Who is calling? Patient  Reason for Call:Exposed to COVID and requesting call back with multiple questions.

## 2022-11-18 NOTE — TELEPHONE ENCOUNTER
Pt's wife tested positive for Covid yesterday evening. Discussed isolation and quarantine, as well as precautions inside and outside the home. Encouraged pt to call back with any further questions or concerns. Pt to mask when around others until day 10 following initial exposure and to take a rapid antigen test on Wednesday, 11/23 per CDC guidelines. Pt confirms understanding.    Tommy HAMLITON, RN  11/18/22 1:27 PM

## 2022-12-02 DIAGNOSIS — H90.3 BILATERAL SENSORINEURAL HEARING LOSS: Primary | ICD-10-CM

## 2022-12-05 ENCOUNTER — OFFICE VISIT (OUTPATIENT)
Dept: OTOLARYNGOLOGY | Facility: CLINIC | Age: 72
End: 2022-12-05
Payer: COMMERCIAL

## 2022-12-05 ENCOUNTER — OFFICE VISIT (OUTPATIENT)
Dept: AUDIOLOGY | Facility: CLINIC | Age: 72
End: 2022-12-05
Payer: COMMERCIAL

## 2022-12-05 VITALS
HEART RATE: 61 BPM | RESPIRATION RATE: 16 BRPM | TEMPERATURE: 97.5 F | SYSTOLIC BLOOD PRESSURE: 146 MMHG | WEIGHT: 260 LBS | DIASTOLIC BLOOD PRESSURE: 88 MMHG | BODY MASS INDEX: 38.96 KG/M2 | OXYGEN SATURATION: 98 %

## 2022-12-05 DIAGNOSIS — H90.3 BILATERAL SENSORINEURAL HEARING LOSS: Primary | ICD-10-CM

## 2022-12-05 DIAGNOSIS — H90.3 SENSORINEURAL HEARING LOSS (SNHL) OF BOTH EARS: Primary | ICD-10-CM

## 2022-12-05 PROCEDURE — 92557 COMPREHENSIVE HEARING TEST: CPT | Performed by: AUDIOLOGIST

## 2022-12-05 PROCEDURE — 92567 TYMPANOMETRY: CPT | Performed by: AUDIOLOGIST

## 2022-12-05 PROCEDURE — 99213 OFFICE O/P EST LOW 20 MIN: CPT | Performed by: OTOLARYNGOLOGY

## 2022-12-05 RX ORDER — AMOXICILLIN 875 MG
TABLET ORAL
COMMUNITY
Start: 2022-12-01 | End: 2023-01-23

## 2022-12-05 ASSESSMENT — PAIN SCALES - GENERAL: PAINLEVEL: NO PAIN (0)

## 2022-12-05 NOTE — PROGRESS NOTES
HISTORY OF PRESENT ILLNESS:  The patient is back to see us again today.  We last saw him a couple of weeks ago for cerumen impaction on the right side.  He feels that his symptoms are more or less back to normal now.  He does use hearing aids.    PHYSICAL EXAMINATION:  On examination, this is a 72-year-old gentleman in no acute distress.  Normal mood and affect, normal ability to communicate.  Alert and appropriate.  Head is normocephalic.  Cranial nerve VII is House-Brackmann I/VI bilaterally.  Breathing without difficulty or stridor.  Eyes are anicteric.  Skin of the head and neck appears normal.  Examination of the ears show narrow canals with normal tympanic membranes.    AUDIOGRAM:  Audiogram is reviewed.  It shows some low frequency mild sensorineural hearing loss bilaterally, downsloping to moderate in the high frequencies with good word recognition.    ASSESSMENT:  A 72-year-old with sensorineural hearing loss and narrow external auditory canals requiring previous cerumenectomy.    PLAN:  At this point, I will have him come back in four months to reevaluate.

## 2022-12-05 NOTE — PROGRESS NOTES
AUDIOLOGY REPORT    SUMMARY: Audiology visit completed. See audiogram for results.      RECOMMENDATIONS: Follow-up with ENT.      Akshat Celis  Audiologist  [place] License  #[id number]

## 2022-12-05 NOTE — LETTER
12/5/2022        RE: Dev Martinez  [address]  [facility] [address]     Dear Colleague,    Thank you for referring your patient, Dev Martinez, to the [facility] NOSE [facility] TH[facility]AT CL[facility] [place] at [facility]. Please see a copy of my visit note below.    HISTORY OF PRESENT ILLNESS:  The patient is back to see us again today.  We last saw him a couple of weeks ago for cerumen impaction on the right side.  He feels that his symptoms are more or less back to normal now.  He does use hearing aids.    PHYSICAL EXAMINATION:  On examination, this is a 72-year-old gentleman in no acute distress.  Normal mood and affect, normal ability to communicate.  Alert and appropriate.  Head is normocephalic.  Cranial nerve VII is House-Brackmann I/VI bilaterally.  Breathing without difficulty or stridor.  Eyes are anicteric.  Skin of the head and neck appears normal.  Examination of the ears show narrow canals with normal tympanic membranes.    AUDIOGRAM:  Audiogram is reviewed.  It shows some low frequency mild sensorineural hearing loss bilaterally, downsloping to moderate in the high frequencies with good word recognition.    ASSESSMENT:  A 72-year-old with sensorineural hearing loss and narrow external auditory canals requiring previous cerumenectomy.    PLAN:  At this point, I will have him come back in four months to reevaluate.      Again, thank you for allowing me to participate in the care of your patient.      Sincerely,    Robert Dong MD

## 2022-12-06 ENCOUNTER — TELEPHONE (OUTPATIENT)
Dept: SLEEP MEDICINE | Facility: CLINIC | Age: 72
End: 2022-12-06

## 2022-12-08 ENCOUNTER — OFFICE VISIT (OUTPATIENT)
Dept: SLEEP MEDICINE | Facility: CLINIC | Age: 72
End: 2022-12-08
Attending: FAMILY MEDICINE
Payer: COMMERCIAL

## 2022-12-08 VITALS
HEART RATE: 55 BPM | HEIGHT: 69 IN | DIASTOLIC BLOOD PRESSURE: 71 MMHG | WEIGHT: 258 LBS | SYSTOLIC BLOOD PRESSURE: 128 MMHG | BODY MASS INDEX: 38.21 KG/M2 | OXYGEN SATURATION: 98 %

## 2022-12-08 DIAGNOSIS — G47.50 PARASOMNIA, UNSPECIFIED TYPE: ICD-10-CM

## 2022-12-08 DIAGNOSIS — E66.01 MORBID OBESITY (H): ICD-10-CM

## 2022-12-08 DIAGNOSIS — G47.33 OBSTRUCTIVE SLEEP APNEA ON CPAP: Primary | ICD-10-CM

## 2022-12-08 DIAGNOSIS — G47.09 SECONDARY INSOMNIA: ICD-10-CM

## 2022-12-08 DIAGNOSIS — R45.1 RESTLESS ARM: ICD-10-CM

## 2022-12-08 PROCEDURE — 99215 OFFICE O/P EST HI 40 MIN: CPT | Performed by: INTERNAL MEDICINE

## 2022-12-08 RX ORDER — CHLORHEXIDINE GLUCONATE ORAL RINSE 1.2 MG/ML
SOLUTION DENTAL
COMMUNITY
Start: 2022-12-01 | End: 2023-01-23

## 2022-12-08 RX ORDER — COVID-19 ANTIGEN TEST
KIT MISCELLANEOUS SEE ADMIN INSTRUCTIONS
COMMUNITY
Start: 2022-12-01 | End: 2023-08-17

## 2022-12-08 ASSESSMENT — SLEEP AND FATIGUE QUESTIONNAIRES
HOW LIKELY ARE YOU TO NOD OFF OR FALL ASLEEP WHILE SITTING QUIETLY AFTER LUNCH WITHOUT ALCOHOL: SLIGHT CHANCE OF DOZING
HOW LIKELY ARE YOU TO NOD OFF OR FALL ASLEEP IN A CAR, WHILE STOPPED FOR A FEW MINUTES IN TRAFFIC: WOULD NEVER DOZE
HOW LIKELY ARE YOU TO NOD OFF OR FALL ASLEEP WHILE SITTING AND READING: MODERATE CHANCE OF DOZING
HOW LIKELY ARE YOU TO NOD OFF OR FALL ASLEEP WHILE WATCHING TV: MODERATE CHANCE OF DOZING
HOW LIKELY ARE YOU TO NOD OFF OR FALL ASLEEP WHILE SITTING AND TALKING TO SOMEONE: SLIGHT CHANCE OF DOZING
HOW LIKELY ARE YOU TO NOD OFF OR FALL ASLEEP WHEN YOU ARE A PASSENGER IN A CAR FOR AN HOUR WITHOUT A BREAK: MODERATE CHANCE OF DOZING
HOW LIKELY ARE YOU TO NOD OFF OR FALL ASLEEP WHILE LYING DOWN TO REST IN THE AFTERNOON WHEN CIRCUMSTANCES PERMIT: MODERATE CHANCE OF DOZING
HOW LIKELY ARE YOU TO NOD OFF OR FALL ASLEEP WHILE SITTING INACTIVE IN A PUBLIC PLACE: SLIGHT CHANCE OF DOZING
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
## 2022-12-08 NOTE — NURSING NOTE
DME order for supplies sent to [facility].    Patient to return in 1 year, sent patient a delayed Yugma message for a 1 year follow up appointment reminder.    Jai Juarez CMA
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
## 2022-12-08 NOTE — PATIENT INSTRUCTIONS
Insomnia - Restless Leg Syndrome (RLS)  Based on the information that you provided today you are likely to have restless leg syndrome that may be interfering with your sleep.  Treatment of restless leg syndrome usually depends on how much it is bothering you.  If it is a major problem then you might want to do something about it.  Here are some treatment options that you might want to consider:       Behavior Changes:     - Reduce or eliminate caffeine and alcohol products     - Increase the amount of stretching that you do, particularly before bedtime     - Sometimes a leg message can be helpful     - Some people find that a warm bath or shower in the evening is helpful       Medications:     - Pramipexole (Mirapex)     - Ropinirole (Requip)     - Sinemet (Carbidopa / Levodopa)     - Neurontin  (gabapentin)    For general sleep health questions:   http://sleepeducation.org    For tips about PAP and COVID-19:  https://www.thoracic.org/patients/patient-resources/resources/covid-19-and-home-pap-therapy.pdf    For general info about COVID-19 including vaccines:  [email or web address]      Continue PAP therapy every night, for all hours that you are sleeping (including naps.)  As always, try to get at least 8 hours of sleep or more each day, keep a regular sleep schedule, and avoid sleep deprivation. Avoid alcohol.  Reasons that you might need a change to your pressure therapy would be weight gain or loss, waking having inadvertently removed your PAP overnight, having previously felt refreshed by sleep with CPAP use and now waking un-refreshed, and return of daytime sleepiness. Also, the development of new medical problems  (such as heart failure, stroke, medications such as narcotics) can sometimes affect breathing at night and change your PAP therapy needs.  Please bring PAP with you if you are hospitalized.  If anticipating surgery be sure to discuss with your surgeon that you have sleep apnea and use  PAP therapy.    Maintain your equipment as recommended which includes routine cleaning and replacement of supplies.      Call DME for any questions regarding supplies or maintenance.    [facility], [facility] [phone]    Do not drive on engage in potentially dangerous activities if feeling sleepy.  Please follow up in sleep clinic again in 12 months.        Tips for your PAP use-    Mask fitting tips  Mask fitting exercise:    To improve your mask seal and your mobility at night, put mask on and secure in place.  Lie down in bed with full pressure and roll to one side, adjust headgear while in that position to eliminate any leaks. Repeat process rolling to other side.     The mask seal does not have to be perfect:   CPAP machines are designed to make up for small leaks. However, you will not tolerate leaks blowing in your eyes so you will need to adjust.   Any leak should only be near or at the bottom of the mask.  We expect your mask to leak slightly at night.    Do not over-tighten the headgear straps, tighter IS NOT better, we expect minimal leak.    First try re-positioning the mask or headgear before tightening the headgear straps.  Mask leaks are expected due to changing sleeping positions. Try pulling the mask away from your skin allowing the cushion to re-inflate will minimize the leak.  If you struggle for a good fit, try turning the CPAP off and then readjust the mask by pulling it away from your face and then turning back on the CPAP.        Humidifier tips  Humidifiers can be adjusted to increase or decrease the amount of moisture according to your comfort level. You may need to adjust this frequently at first, but then might only change it with seasonal weather changes.     Try INCREASING the humidity if:  You experience a dry, irritated nasal passage or throat.  You have a runny, drippy nose or sneezing fits after using CPAP.  You experience nasal  congestion during or after CPAP use.    Try DECREASING the humidity if:  You have excessive condensation or  rain out  in the tubing or mask.  Otherwise keep the tubing warm during the night by running it underneath the blankets or pillow.      Clinic visit after initial PAP set-up   Bring your equipment with you to your 5-8 week follow up clinic visit.  We will be extracting your data from the machine if not available from the cloud based modem.        Travel  Always take your equipment with you when you travel.  If you fly with your equipment bring it on with you as a carry on.  Medical equipment does not count as a carry on.    If you travel international the machines take 110-240v.  The only adapter needed is the adapter that will fit into the receptacle (outlet).    You may also want to bring an extension cord as many hotel rooms have limited outlets at the bedside.  Do not travel with water in your humidifier chamber.     Cleaning and Maintenance Guidelines    Equipment Frequency Cleaning Method   Mask First Day    Daily      Weekly Soak mask in hot soapy water for 30 minutes, rinse and air dry.  Wipe nasal cushion with a hot soapy (Ivory, baby shampoo) cloth and rinse.  Baby wipes may also be used.  Do not use anti-bacterial soaps,Velma  liquid soap, rubbing alcohol, bleach or ammonia.  Wash frame in hot soapy water (Ivory, baby shampoo) rinse and let air dry   Headgear Biweekly Wash in hot soapy water, rinse and air dry   Reusable Gray Filter Weekly Wash in hot soapy water, rinse, put in towel squeeze moisture out, let air dry   Disposable White Filter Check Weekly Replace when brown or gray in color; at least every 2 to 3 months   Humidifier Chamber Daily    Weekly Empty distilled water from humidifier and let air dry    Hand wash in hot soapy water, rinse and air dry   Tubing Weekly Wash in hot soapy water, rinse and let air dry   Mask, Tubing and Humidifier Chamber As needed Disinfect: Soak in 1 part  distilled white vinegar to 3 parts hot water for 30 minutes, rinse well and air dry  Not the material headgear        MASK AND SUPPLY REORDERING and EQUIPMENT NEEDS through your DME and per your insurance  Reminder: Most insurance companies will allow for a new mask, headgear, tubing, and reusable gray filter every six months.  Disposable white ultra-fine filters are covered monthly.      HOME AND SAFETY INSTRUCTIONS  Do not use frayed or cracked electrical cords, multi plug adaptors, or switched receptacles  Do not immerse electrical equipment into water  Assure that electrical cords do not become a tripping hazard      Your BMI is Body mass index is 38.66 kg/m .    What is BMI?  Body mass index (BMI) is one way to tell whether you are at a healthy weight, overweight, or obese. It measures your weight in relation to your height.  A BMI of 18.5 to 24.9 is in the healthy range. A person with a BMI of 25 to 29.9 is considered overweight, and someone with a BMI of 30 or greater is considered obese.  Another way to find out if you are at risk for health problems caused by overweight and obesity is to measure your waist. If you are a woman and your waist is more than 35 inches, or if you are a man and your waist is more than 40 inches, your risk of disease may be higher.  More than two-thirds of American adults are considered overweight or obese. Being overweight or obese increases the risk for further weight gain.  Excess weight may lead to heart disease and diabetes. Creating and following plans for healthy eating and physical activity may help you improve your health.    Methods for maintaining or losing weight.  Weight control is part of healthy lifestyle and includes exercise, emotional health, and healthy eating habits.  Careful eating habits lifelong is the mainstay of weight control.  Though there are significant health benefits from weight loss, long-term weight loss with diet alone may be very difficult to  achieve- studies show long-term success with dietary management in less than 10% of people. Attaining a healthy weight may be especially difficult to achieve in those with severe obesity. In some cases, medications, devices and surgical management might be considered.    What can you do?  If you are overweight or obese and are interested in methods for weight loss, you should discuss this with your provider. In addition, we recommend that you review healthy life styles and methods for weight loss available through the National Institutes of Health patient information sites:   http://win.niddk.nih.gov/publications/index.htm

## 2022-12-12 ENCOUNTER — OFFICE VISIT (OUTPATIENT)
Dept: FAMILY MEDICINE | Facility: CLINIC | Age: 72
End: 2022-12-12
Payer: COMMERCIAL

## 2022-12-12 VITALS
HEART RATE: 49 BPM | SYSTOLIC BLOOD PRESSURE: 132 MMHG | BODY MASS INDEX: 39.26 KG/M2 | OXYGEN SATURATION: 99 % | DIASTOLIC BLOOD PRESSURE: 70 MMHG | WEIGHT: 262 LBS | RESPIRATION RATE: 15 BRPM | TEMPERATURE: 97.1 F

## 2022-12-12 DIAGNOSIS — G89.29 CHRONIC RIGHT-SIDED LOW BACK PAIN WITHOUT SCIATICA: ICD-10-CM

## 2022-12-12 DIAGNOSIS — M54.50 CHRONIC RIGHT-SIDED LOW BACK PAIN WITHOUT SCIATICA: ICD-10-CM

## 2022-12-12 DIAGNOSIS — R93.7 ABNORMAL X-RAY OF LUMBAR SPINE: ICD-10-CM

## 2022-12-12 DIAGNOSIS — M25.551 RIGHT HIP PAIN: Primary | ICD-10-CM

## 2022-12-12 NOTE — PATIENT INSTRUCTIONS
ASSESSMENT/PLAN:    73 yo with acute on chronic RIGHT low back pain with RIGHT hip (proximal thigh) ache.   Obtain XRays of lumbar spine and hip. Call [phone] or [phone] to schedule [facility] tests [facility].   Sent to physical therapy  Encouraged to try and make dietary changes to lose about 10-15 lbs.  Follow up in about 6 weeks, sooner if needed.        --Germán Mayer MD
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
## 2022-12-12 NOTE — NURSING NOTE
72 year old  Chief Complaint   Patient presents with     Musculoskeletal Problem     R Hip pain x 3-4 weeks,        Blood pressure 132/70, pulse (!) 49, temperature 97.1  F (36.2  C), temperature source Skin, resp. rate 15, weight 118.8 kg (262 lb), SpO2 99 %. Body mass index is 39.26 kg/m .  Patient Active Problem List   Diagnosis     Essential hypertension     Obstructive sleep apnea on CPAP     Medicare annual wellness visit, subsequent     Left bundle branch block     Morbid obesity (H)     Bilateral sensorineural hearing loss     Acute gouty arthritis     Wears hearing aid in both ears     Type 2 diabetes mellitus without complication, without long-term current use of insulin (H)     Restless arm     Secondary insomnia     Pre-diabetes       Wt Readings from Last 2 Encounters:   12/12/22 118.8 kg (262 lb)   12/08/22 117 kg (258 lb)     BP Readings from Last 3 Encounters:   12/12/22 132/70   12/08/22 128/71   12/05/22 (!) 146/88         Current Outpatient Medications   Medication     amoxicillin (AMOXIL) 875 MG tablet     chlorhexidine (PERIDEX) 0.12 % solution     hydrochlorothiazide (HYDRODIURIL) 25 MG tablet     hydrocortisone (WESTCORT) 0.2 % external cream     ketoconazole (NIZORAL) 2 % external cream     lisinopril (ZESTRIL) 5 MG tablet     mupirocin (BACTROBAN) 2 % external ointment     omeprazole (PRILOSEC) 20 MG DR capsule     propranolol (INDERAL) 40 MG tablet     QUICKVUE AT-HOME COVID-19 TEST KIT     rOPINIRole (REQUIP) 0.5 MG tablet     No current facility-administered medications for this visit.       Social History     Tobacco Use     Smoking status: Some Days     Types: Cigars     Start date: 6/15/2008     Smokeless tobacco: Never     Tobacco comments:     smokes cigars, daily in afternoon   Substance Use Topics     Alcohol use: Yes     Alcohol/week: 12.0 standard drinks     Types: 7 Glasses of wine, 5 Standard drinks or equivalent per week     Comment: 1-2 drinks per day, not nightly     Drug  use: No       Health Maintenance Due   Topic Date Due     ADVANCE CARE PLANNING  Never done     LUNG CANCER SCREENING  Never done     EYE EXAM  02/20/2021     A1C  10/28/2022       No results found for: PAP      December 12, 2022 2:30 PM

## 2022-12-20 DIAGNOSIS — I10 ESSENTIAL HYPERTENSION: ICD-10-CM

## 2022-12-20 RX ORDER — PROPRANOLOL HYDROCHLORIDE 40 MG/1
TABLET ORAL
Qty: 90 TABLET | Refills: 1 | Status: SHIPPED | OUTPATIENT
Start: 2022-12-20 | End: 2023-08-17

## 2022-12-20 NOTE — TELEPHONE ENCOUNTER
Last visit 12/12/22, future appt 1/23/23  Prescription approved per [facility] Refill Protocol.    Ewelina Crandall RN  [facility]

## 2022-12-21 ENCOUNTER — ANCILLARY PROCEDURE (OUTPATIENT)
Dept: GENERAL RADIOLOGY | Facility: CLINIC | Age: 72
End: 2022-12-21
Attending: FAMILY MEDICINE
Payer: COMMERCIAL

## 2022-12-21 DIAGNOSIS — M25.551 RIGHT HIP PAIN: ICD-10-CM

## 2022-12-21 PROCEDURE — 72100 X-RAY EXAM L-S SPINE 2/3 VWS: CPT | Performed by: STUDENT IN AN ORGANIZED HEALTH CARE EDUCATION/TRAINING PROGRAM

## 2022-12-21 PROCEDURE — 73502 X-RAY EXAM HIP UNI 2-3 VIEWS: CPT | Mod: RT | Performed by: RADIOLOGY

## 2022-12-26 ENCOUNTER — HEALTH MAINTENANCE LETTER (OUTPATIENT)
Age: 72
End: 2022-12-26

## 2023-01-06 ENCOUNTER — ANCILLARY PROCEDURE (OUTPATIENT)
Dept: CT IMAGING | Facility: CLINIC | Age: 73
End: 2023-01-06
Attending: FAMILY MEDICINE
Payer: COMMERCIAL

## 2023-01-06 ENCOUNTER — THERAPY VISIT (OUTPATIENT)
Dept: PHYSICAL THERAPY | Facility: CLINIC | Age: 73
End: 2023-01-06
Attending: FAMILY MEDICINE
Payer: COMMERCIAL

## 2023-01-06 DIAGNOSIS — R93.7 ABNORMAL X-RAY OF LUMBAR SPINE: ICD-10-CM

## 2023-01-06 DIAGNOSIS — G89.29 CHRONIC RIGHT-SIDED LOW BACK PAIN WITHOUT SCIATICA: ICD-10-CM

## 2023-01-06 DIAGNOSIS — M25.551 RIGHT HIP PAIN: ICD-10-CM

## 2023-01-06 DIAGNOSIS — M54.50 CHRONIC RIGHT-SIDED LOW BACK PAIN WITHOUT SCIATICA: ICD-10-CM

## 2023-01-06 PROCEDURE — 97110 THERAPEUTIC EXERCISES: CPT | Mod: GP | Performed by: PHYSICAL THERAPIST

## 2023-01-06 PROCEDURE — 72131 CT LUMBAR SPINE W/O DYE: CPT | Mod: GC | Performed by: RADIOLOGY

## 2023-01-06 PROCEDURE — 97161 PT EVAL LOW COMPLEX 20 MIN: CPT | Mod: GP | Performed by: PHYSICAL THERAPIST

## 2023-01-09 ENCOUNTER — TELEPHONE (OUTPATIENT)
Dept: SLEEP MEDICINE | Facility: CLINIC | Age: 73
End: 2023-01-09

## 2023-01-09 ENCOUNTER — OFFICE VISIT (OUTPATIENT)
Dept: FAMILY MEDICINE | Facility: CLINIC | Age: 73
End: 2023-01-09
Payer: COMMERCIAL

## 2023-01-09 VITALS — BODY MASS INDEX: 38.88 KG/M2 | WEIGHT: 259.5 LBS

## 2023-01-09 DIAGNOSIS — Z71.3 DIETARY COUNSELING AND SURVEILLANCE: Primary | ICD-10-CM

## 2023-01-09 DIAGNOSIS — E66.01 MORBID OBESITY (H): ICD-10-CM

## 2023-01-09 NOTE — TELEPHONE ENCOUNTER
Reason for Call:  Other order    Detailed comments: patient called and would like new cpap supplies order [facility]    Please contact patient.  Thank you.    Phone Number Patient can be reached at: Cell number on file:    Telephone Information:   Mobile [phone]       Best Time: any    Can we leave a detailed message on this number? YES    Call taken on 1/9/2023 at 9:29 AM by Zuleima Mcnamara

## 2023-01-09 NOTE — PROGRESS NOTES
Physical Therapy Initial Evaluation  Subjective:    Therapist Generated HPI Evaluation  Problem details: Pt presents to PT with a chief complaint of Chronic R sided LBP with recent flare up of R sided thigh pain ~2 months ago. Pain worse primarily with transitional movements (getting in and out of a car). Lumbar radiographs reveal multilevel spondylosis and grade 1 retrolisthesis at L2-3. Hip radiographs reveal mild DJD. .         Type of problem:  Lumbar.    This is a new condition.  Condition occurred with:  Degenerative joint disease.  Where condition occurred: for unknown reasons.  Patient reports pain:  Lumbar spine right.  Pain is described as aching and is intermittent.  Pain radiates to:  Thigh right. Pain is worse in the P.M..  Since onset symptoms are unchanged.  Associated symptoms:  Loss of motion/stiffness. Symptoms are exacerbated by lifting, twisting and certain positions (transitional movements)  and relieved by rest and NSAID's.  Special tests included:  X-ray and CT scan.    Barriers include:  None as reported by patient.    Patient Health History         Pain is reported as 4/10 on pain scale.  General health as reported by patient is good.  Pertinent medical history includes: overweight.   Red flags:  None as reported by patient.  Medical allergies: none.   Surgeries include:  None.    Current medications:  None.                         Oswestry Score: 12 %                 Objective:    Gait:    Gait Type:  Normal                    Lumbar/SI Evaluation  ROM:    AROM Lumbar:   Flexion:            Min loss  Ext:                    Mod loss, pain +    Side Bend:        Left:  Min loss    Right:  Min loss  Rotation:           Left:  Min loss    Right:  Min loss, pain ++  Side Glide:        Left:     Right:           Lumbar Myotomes:  normal                Lumbar Dermtomes:  normal                Neural Tension/Mobility:  Lumbar:  Normal              Spinal Segmental Conclusions:     Level: Hypo  noted at L3 and L4                                        Hip Evaluation    Hip Strength:    Flexion:   Left: 4+/5   Pain:  Right: 4+/5   +  Pain:                    Extension:  Left: 4/5  Pain:Right: 4/5    Pain:    Abduction:  Left: 4/5     Pain:Right: 4-/5    Pain:                                 General     ROS    Assessment/Plan:    Patient is a 72 year old male with lumbar complaints.    Patient has the following significant findings with corresponding treatment plan.                Diagnosis 1:  R sided LBP  Pain -  manual therapy, splint/taping/bracing/orthotics, self management, education, directional preference exercise and home program  Decreased ROM/flexibility - manual therapy and therapeutic exercise  Decreased joint mobility - manual therapy and therapeutic exercise  Decreased strength - therapeutic exercise and therapeutic activities  Impaired muscle performance - neuro re-education  Decreased function - therapeutic activities    Therapy Evaluation Codes:     Cumulative Therapy Evaluation is: Low complexity.    Previous and current functional limitations:  (See Goal Flow Sheet for this information)    Short term and Long term goals: (See Goal Flow Sheet for this information)     Communication ability:  Patient appears to be able to clearly communicate and understand verbal and written communication and follow directions correctly.  Treatment Explanation - The following has been discussed with the patient:   RX ordered/plan of care  Anticipated outcomes  Possible risks and side effects  This patient would benefit from PT intervention to resume normal activities.   Rehab potential is good.    Frequency:  1 X week, once daily  Duration:  for 4 weeks tapering to 2 X a month over 4 weeks  Discharge Plan:  Achieve all LTG.  Independent in home treatment program.  Reach maximal therapeutic benefit.    Please refer to the daily flowsheet for treatment today, total treatment time and time spent performing 1:1  timed codes.

## 2023-01-09 NOTE — PATIENT INSTRUCTIONS
1. Add a planned snack in the afternoon to prevent feeling over hungry for dinner. Ideas: Fruit/veg, greek yogurt, veggie straws; String cheese, fruit/veg; Cottage cheese, fruit/veg; Nuts (but limit to 1/4 cup) along with fruit/veg and other proteins.  2. Aim for half of your plate to be vegetables, 1/4 protein and 1/4 carbohdyrate  3. Cut back on added bread with meals  4. Rejoin the gym    Monitoring/Evaluation:  Monday February 6 at 10:00 am in clinic    Maral Brown RD

## 2023-01-09 NOTE — PROGRESS NOTES
[place] Nutrition Assessment    Dev is a 72 year old male who presents for nutrition counseling related to weight management. He states that he'd like to lose weight to 200 lbs - reflects he was  in 1998 and weighed 217 lb. Open to idea of 5% loss in 3-4 months. Portion control is a challenge. Has worked with a dietitian in the past.     Recent diet recall:  Wakes at 7-8 am  Breakfast: instant oatmeal, 1 tbsp crushed walnuts and maple syrup, cup of coffee. English muffin with peanut butter and marmalade. Or scrambled eggs/omlette with a slice of ham (sometimes having this for lunch). Sometimes cold cereal (raisin bran, granola, all bran, whole milk).   Lunch: sandwich or leftovers, or James[employer] frozen soup. But also potato chips or a baked item.   Dinner: mustard chicken, now trying to eat more vegetables. Italian sausage soup, carrots, potatoes, cannoleni beans, tomatoes - enough for the week. Pasta and red sauce.   Beverages: 2 glasses of wine a few nights per week, less beer only 1.     Feels portions are too large, and having bread with meals.   Snacking between meals - veggie straws, tortilla chips, potato chips. Will try for an apple at times. Eats more fruits in the summer. When conscious will keep carrots around.    Social: Lives with wife, Dev does most of the cooking. Retired.   Wife works in evenings for [employer]. They travel a bit, less now until May 2023.   Physical activity: Walking primarily, considers swimming. Used to stretch back more regularly.   Medications: Reviewed, includes lisinopril, propranolol, HCTZ  Vitamins/Supplements: Not discussed today    Recent weights:   Wt Readings from Last 5 Encounters:   01/09/23 117.7 kg (259 lb 8 oz)   12/12/22 118.8 kg (262 lb)   12/08/22 117 kg (258 lb)   12/05/22 117.9 kg (260 lb)   11/14/22 117.9 kg (260 lb)     Recent A1C values:   Hemoglobin A1C   Date Value Ref Range Status   07/28/2022 8.6 (H) 0.0 - 5.6 % Final     Comment:     Normal <5.7%    Prediabetes 5.7-6.4%    Diabetes 6.5% or higher     Note: Adopted from ADA consensus guidelines.   06/03/2021 6.3 (H) 4.1 - 5.7 % Final   03/11/2021 6.8 (H) 4.1 - 5.7 % Final   08/12/2020 6.2 (H) 4.1 - 5.7 % Final     Recent lipid values:   Cholesterol   Date Value Ref Range Status   07/28/2022 181 <200 mg/dL Final   03/11/2021 175.0 0.0 - 200.0 Final   12/19/2019 164.0 0.0 - 200.0 Final     HDL Cholesterol   Date Value Ref Range Status   03/11/2021 49.0 >40.0 Final   12/19/2019 54.0 >40.0 Final     Direct Measure HDL   Date Value Ref Range Status   07/28/2022 40 >=40 mg/dL Final     LDL Cholesterol Calculated   Date Value Ref Range Status   07/28/2022 114 (H) <=100 mg/dL Final   04/11/2018 87 <100 mg/dL Final     Comment:     Desirable:       <100 mg/dl     LDL Cholesterol Direct   Date Value Ref Range Status   03/11/2021 97.0 0.0 - 129.0 Final   12/19/2019 96.0 0.0 - 129.0 Final     Triglycerides   Date Value Ref Range Status   07/28/2022 137 <150 mg/dL Final   03/11/2021 146.0 0.0 - 150.0 Final   12/19/2019 70.0 0.0 - 150.0 Final       Intervention(s):  Dev is a 72 year old male who presents for nutrition counseling related to weight management. Dev tends to snack on chips and admits that meal portion sizes are large. He would like to lose 10-15 lbs in the next 3-4 months.   1. Add a planned snack in the afternoon to prevent feeling over hungry for dinner. Ideas: Fruit/veg, greek yogurt, veggie straws; String cheese, fruit/veg; Cottage cheese, fruit/veg; Nuts (but limit to 1/4 cup) along with fruit/veg and other proteins.  2. Aim for half of your plate to be vegetables, 1/4 protein and 1/4 carbohdyrate  3. Cut back on added bread with meals  4. Rejoin the gym    Monitoring/Evaluation:  Monday February 6 at 10:00 am in clinic    Patient referred by Behzad Pisano MD, MD   Total time spent with patient 40 minutes    Maral Brown RD

## 2023-01-10 PROBLEM — M25.551 RIGHT HIP PAIN: Status: ACTIVE | Noted: 2023-01-10

## 2023-01-10 PROBLEM — G89.29 CHRONIC RIGHT-SIDED LOW BACK PAIN WITHOUT SCIATICA: Status: ACTIVE | Noted: 2023-01-10

## 2023-01-10 PROBLEM — M54.50 CHRONIC RIGHT-SIDED LOW BACK PAIN WITHOUT SCIATICA: Status: ACTIVE | Noted: 2023-01-10

## 2023-01-10 NOTE — PROGRESS NOTES
[facility]    OUTPATIENT Physical Therapy ORTHOPEDIC EVALUATION  PLAN OF TREATMENT FOR OUTPATIENT REHABILITATION  (COMPLETE FOR INITIAL CLAIMS ONLY)  Patient's Last Name, First Name, M.I.  YOB: 1950  Dev Martinez    Provider s Name:  [facility]   Medical Record No.  [id number]   Start of Care Date:  01/06/23   Onset Date:   12/12/22   Treatment Diagnosis:  R sided LBP Medical Diagnosis:     Right hip pain  Chronic right-sided low back pain without sciatica       Goals:     01/06/23 0500   Body Part   Goals listed below are for R sided LBP/R hip   Goal #1   Goal #1 self cares/transfers/bed mobility   Previous Functional Level No restrictions   Current Functional Level Able ;to transfer in and out of a car   Performance Level pain 4/10 R thigh   STG Target Performance Be able to ; transfer in and out of a chair   Performance Level painfree x 10   Rationale for independent community transportation   Due Date 02/06/23   LTG Target Performance Be able to ; transfer in and out of a car   Performance level painfree   Rationale for independent living   Due Date 03/06/23         Therapy Frequency:  1x week  Predicted Duration of Therapy Intervention:  4 weeks then 2 x month for 1 month    Robi Fraser, PT                 I CERTIFY THE NEED FOR THESE SERVICES FURNISHED UNDER        THIS PLAN OF TREATMENT AND WHILE UNDER MY CARE     (Physician attestation of this document indicates review and certification of the therapy plan).                     Certification Date From:  01/06/23   Certification Date To:  04/05/23    Referring Provider:  Germán Mayer    Initial Assessment        See Epic Evaluation SOC Date: 01/06/23

## 2023-01-12 ENCOUNTER — THERAPY VISIT (OUTPATIENT)
Dept: PHYSICAL THERAPY | Facility: CLINIC | Age: 73
End: 2023-01-12
Payer: COMMERCIAL

## 2023-01-12 DIAGNOSIS — M54.50 CHRONIC RIGHT-SIDED LOW BACK PAIN WITHOUT SCIATICA: Primary | ICD-10-CM

## 2023-01-12 DIAGNOSIS — G89.29 CHRONIC RIGHT-SIDED LOW BACK PAIN WITHOUT SCIATICA: Primary | ICD-10-CM

## 2023-01-12 DIAGNOSIS — M25.551 RIGHT HIP PAIN: ICD-10-CM

## 2023-01-12 PROCEDURE — 97140 MANUAL THERAPY 1/> REGIONS: CPT | Mod: GP | Performed by: PHYSICAL THERAPIST

## 2023-01-12 PROCEDURE — 97112 NEUROMUSCULAR REEDUCATION: CPT | Mod: GP | Performed by: PHYSICAL THERAPIST

## 2023-01-12 PROCEDURE — 97110 THERAPEUTIC EXERCISES: CPT | Mod: GP | Performed by: PHYSICAL THERAPIST

## 2023-01-13 NOTE — TELEPHONE ENCOUNTER
Spoke with Dev and explained there was a current order for cpap supplies on file in epic dated 12/8/2022.  Verified he uses [facility] for supplies, and asked him to call them to order what he needs.  I explained they can see the order in his medical chart.

## 2023-01-19 ENCOUNTER — THERAPY VISIT (OUTPATIENT)
Dept: PHYSICAL THERAPY | Facility: CLINIC | Age: 73
End: 2023-01-19
Payer: COMMERCIAL

## 2023-01-19 DIAGNOSIS — G89.29 CHRONIC RIGHT-SIDED LOW BACK PAIN WITHOUT SCIATICA: Primary | ICD-10-CM

## 2023-01-19 DIAGNOSIS — M25.551 RIGHT HIP PAIN: ICD-10-CM

## 2023-01-19 DIAGNOSIS — M54.50 CHRONIC RIGHT-SIDED LOW BACK PAIN WITHOUT SCIATICA: Primary | ICD-10-CM

## 2023-01-19 PROCEDURE — 97140 MANUAL THERAPY 1/> REGIONS: CPT | Mod: GP | Performed by: PHYSICAL THERAPIST

## 2023-01-19 PROCEDURE — 97112 NEUROMUSCULAR REEDUCATION: CPT | Mod: GP | Performed by: PHYSICAL THERAPIST

## 2023-01-19 PROCEDURE — 97110 THERAPEUTIC EXERCISES: CPT | Mod: GP | Performed by: PHYSICAL THERAPIST

## 2023-01-20 NOTE — PROGRESS NOTES
PROGRESS  REPORT    Progress reporting period is from 1/6/23 to 1/20/23. Pt has attended 3 PT sessions addressing his chronic LBP with recent exacerbation of R hip and anterior thigh pain ~3 months ago. Pt reports feeling about the same since the start of PT but acknowledges his R sided hip tightness and weakness and feels the exercises are helpful. R thigh pain reported to be intermittent and somewhat random but is present at night. Pain sometimes feels like a buzzing sensation.     Exam reveals R sided quadriceps tightness and weak R hip flexion. Loss of lumbar rotation and extension    SUBJECTIVE  Subjective: Pt reports feeling about the same. R thigh pain remains intermittent and somewhat random. Wonders if there is a link to his RLS medication. Pain remains at R anterior hip and thigh    Current pain level is 3/10  .     Previous pain level was  3/10 Initial Pain level: 3/10.   Changes in function:  Yes (See Goal flowsheet attached for changes in current functional level)  Adverse reaction to treatment or activity: None    OBJECTIVE  Changes noted in objective findings:  Yes,   R quadriceps and hip flexor tightness. Weak R hip flexion 4+/5. Hypomobile mid lumbar spine. Responds well to MT techniques. Good technique w/ HEP.     ASSESSMENT/PLAN  Updated problem list and treatment plan: Diagnosis 1:  R LBP w/ R LE radiation    STG/LTGs have been met or progress has been made towards goals:  Yes (See Goal flow sheet completed today.)  Assessment of Progress: The patient's condition has potential to improve.  Self Management Plans:  Patient has been instructed in a home treatment program.  Patient is independent in a home treatment program.  I have re-evaluated this patient and find that the nature, scope, duration and intensity of the therapy is appropriate for the medical condition of the patient.  Peter continues to require the following intervention to meet STG and LTG's:  PT    Recommendations:  This patient  would benefit from continued therapy.     Frequency:  1 X week, once daily  Duration:  for 4 weeks        Please refer to the daily flowsheet for treatment today, total treatment time and time spent performing 1:1 timed codes.

## 2023-01-23 ENCOUNTER — OFFICE VISIT (OUTPATIENT)
Dept: FAMILY MEDICINE | Facility: CLINIC | Age: 73
End: 2023-01-23
Payer: COMMERCIAL

## 2023-01-23 VITALS
WEIGHT: 256 LBS | TEMPERATURE: 97.3 F | DIASTOLIC BLOOD PRESSURE: 82 MMHG | RESPIRATION RATE: 15 BRPM | BODY MASS INDEX: 38.8 KG/M2 | SYSTOLIC BLOOD PRESSURE: 137 MMHG | HEART RATE: 53 BPM | HEIGHT: 68 IN | OXYGEN SATURATION: 99 %

## 2023-01-23 DIAGNOSIS — M79.651 PAIN OF RIGHT THIGH: Primary | ICD-10-CM

## 2023-01-23 NOTE — PROGRESS NOTES
Medical assistant intake:  Dev Martinez is a 72 year old male who presents to [facility] today for Follow Up (Ongoing knee pain. Through the thigh-dull ache and tingling.)        ASSESSMENT/PLAN:    1. RIGHT thigh pain in a 71 yo with RIGHT low back and hip DJD     -Will obtain X-ray of RIGHT thigh (femur)  - Also, working with Physical therapy  - For concern of night muscle aches, try Magnesium 325-400 mg near to bedtime  - If the hip pain persists, consider MRI of the hip and/or injection of the hip under radiographic guidance.     --Germán Mayer MD      SUBJECTIVE:   Dev is here for follow-up of some musculoskeletal pains.  Seen last on 12/12/22 with RIGHT low back and RIGHT hip pains.   States that his back and hip are both better. Went to physical therapy and that is helping.   Recently had a lumbar spine CT with following interpretation:  Impression:  1. No evidence of acute fracture.  2. Multilevel spondylosis without high-grade spinal canal stenosis.  Moderate right neural foraminal stenosis at L5-S1.    Has a visit with a spine specialist in another week.     Today he is here as he has some aching in his RIGHT thigh. Comes on at night and lasts about an hour.   He's had some sleep problems and Dev feels that the RIGHT thigh ache may be related to muscle aches with restless leg.     Review Of Systems:    See subjective.   Has otherwise been in usual state of health, e.g.   Cardiovascular: negative  Respiratory: No shortness of breath, dyspnea on exertion, cough, or hemoptysis  Gastrointestinal: negative  Genitourinary: negative    Problem list per EMR:  Patient Active Problem List   Diagnosis     Essential hypertension     Obstructive sleep apnea on CPAP     Medicare annual wellness visit, subsequent     Left bundle branch block     Morbid obesity (H)     Bilateral sensorineural hearing loss     Acute gouty arthritis     Wears hearing aid in both ears     Type 2 diabetes mellitus without  "complication, without long-term current use of insulin (H)     Restless arm     Secondary insomnia     Pre-diabetes     Right hip pain     Chronic right-sided low back pain without sciatica       Current Outpatient Medications   Medication Sig Dispense Refill     hydrochlorothiazide (HYDRODIURIL) 25 MG tablet Take 1 tablet (25 mg) by mouth daily 90 tablet 1     hydrocortisone (WESTCORT) 0.2 % external cream Apply sparingly to affected area three times daily for 14 days. 30 g 1     ketoconazole (NIZORAL) 2 % external cream Apply topically 2 times daily 30 g 1     lisinopril (ZESTRIL) 5 MG tablet Take 1 tablet (5 mg) by mouth daily 90 tablet 1     omeprazole (PRILOSEC) 20 MG DR capsule Take 1 capsule (20 mg) by mouth daily 90 capsule 3     propranolol (INDERAL) 40 MG tablet Take one-half tab in the AM and one-half tab in the PM. 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  Appears in his usual state of health.  His area of discomfort is in the anterior right thigh both in the middle medial aspect and also in the distal one third slightly on the lateral aspect.  There was no redness warmth or swelling in either of these areas.  His thigh strength was slightly diminished on the right side as compared to the left but he could easily do an active straight leg raise with either leg.  He could also bend his knee and had generally normal range of motion of his hip although occasional aching in the groin with extreme " internal range of motion of the hip.  Negative straight leg raise.  His knee had normal range of motion without any redness warmth or effusion.  His patellar mobility was intact without apprehension    SEE TOP OF NOTE FOR ASSESSMENT AND PLAN    --Germán Mayer MD  [facility], Department of Family Medicine and Community Health

## 2023-01-23 NOTE — NURSING NOTE
"72 year old  Chief Complaint   Patient presents with     Follow Up     Ongoing knee pain. Through the thigh-dull ache and tingling.       Blood pressure 137/82, pulse 53, temperature 97.3  F (36.3  C), temperature source Skin, resp. rate 15, height 1.727 m (5' 8\"), weight 116.1 kg (256 lb), SpO2 99 %. Body mass index is 38.92 kg/m .  Patient Active Problem List   Diagnosis     Essential hypertension     Obstructive sleep apnea on CPAP     Medicare annual wellness visit, subsequent     Left bundle branch block     Morbid obesity (H)     Bilateral sensorineural hearing loss     Acute gouty arthritis     Wears hearing aid in both ears     Type 2 diabetes mellitus without complication, without long-term current use of insulin (H)     Restless arm     Secondary insomnia     Pre-diabetes     Right hip pain     Chronic right-sided low back pain without sciatica       Wt Readings from Last 2 Encounters:   01/23/23 116.1 kg (256 lb)   01/09/23 117.7 kg (259 lb 8 oz)     BP Readings from Last 3 Encounters:   01/23/23 137/82   12/12/22 132/70   12/08/22 128/71         Current Outpatient Medications   Medication     hydrochlorothiazide (HYDRODIURIL) 25 MG tablet     hydrocortisone (WESTCORT) 0.2 % external cream     ketoconazole (NIZORAL) 2 % external cream     lisinopril (ZESTRIL) 5 MG tablet     omeprazole (PRILOSEC) 20 MG DR capsule     propranolol (INDERAL) 40 MG tablet     QUICKVUE AT-HOME COVID-19 TEST KIT     rOPINIRole (REQUIP) 0.5 MG tablet     amoxicillin (AMOXIL) 875 MG tablet     chlorhexidine (PERIDEX) 0.12 % solution     mupirocin (BACTROBAN) 2 % external ointment     No current facility-administered medications for this visit.       Social History     Tobacco Use     Smoking status: Some Days     Types: Cigars     Start date: 6/15/2008     Smokeless tobacco: Never     Tobacco comments:     smokes cigars, daily in afternoon   Vaping Use     Vaping Use: Never used   Substance Use Topics     Alcohol use: Yes     " Alcohol/week: 12.0 standard drinks     Types: 7 Glasses of wine, 5 Standard drinks or equivalent per week     Comment: 1-2 drinks per day, not nightly     Drug use: No       Health Maintenance Due   Topic Date Due     ADVANCE CARE PLANNING  Never done     LUNG CANCER SCREENING  Never done     EYE EXAM  02/20/2021     A1C  10/28/2022       No results found for: PAP      January 23, 2023 10:34 AM

## 2023-01-23 NOTE — PATIENT INSTRUCTIONS
ASSESSMENT/PLAN:    RIGHT thigh pain in a 71 yo with RIGHT low back and hip DJD     -Will obtain X-ray of RIGHT thigh  - Also, working with Physical therapy  - For concern of night muscle aches, try Magnesium 325-400 mg near to bedtime  - If the hip pain persists, consider MRI of the hip and/or injection of the hip under radiographic guidance.     --Germán Mayer MD

## 2023-01-30 ENCOUNTER — OFFICE VISIT (OUTPATIENT)
Dept: NEUROSURGERY | Facility: CLINIC | Age: 73
End: 2023-01-30
Attending: FAMILY MEDICINE
Payer: COMMERCIAL

## 2023-01-30 ENCOUNTER — ANCILLARY PROCEDURE (OUTPATIENT)
Dept: GENERAL RADIOLOGY | Facility: CLINIC | Age: 73
End: 2023-01-30
Attending: FAMILY MEDICINE
Payer: COMMERCIAL

## 2023-01-30 VITALS
WEIGHT: 256 LBS | DIASTOLIC BLOOD PRESSURE: 72 MMHG | BODY MASS INDEX: 38.8 KG/M2 | HEIGHT: 68 IN | SYSTOLIC BLOOD PRESSURE: 134 MMHG | OXYGEN SATURATION: 96 % | HEART RATE: 55 BPM

## 2023-01-30 DIAGNOSIS — M79.651 PAIN OF RIGHT THIGH: ICD-10-CM

## 2023-01-30 DIAGNOSIS — M54.16 LUMBAR RADICULOPATHY: Primary | ICD-10-CM

## 2023-01-30 PROCEDURE — 99204 OFFICE O/P NEW MOD 45 MIN: CPT | Performed by: NURSE PRACTITIONER

## 2023-01-30 PROCEDURE — 73552 X-RAY EXAM OF FEMUR 2/>: CPT | Mod: TC | Performed by: RADIOLOGY

## 2023-01-30 NOTE — PROGRESS NOTES
Dr. Wali Lopez   [facility]surgery Clinic Visit      CC: low back pain, right thigh pain     Primary Care Provider: Behzad Pisano    Reason For Visit:   I was asked by Dr. Mayer to consult on the patient for: abnormal xray of lumbar spine       HPI: Dev Martinez is a 72 year old male who presents for evaluation of chronic low back pain and acute right anterior thigh pain. Patient reports right anterior thigh pain developed a few months ago, denies any trauma or injuries at onset. Today, patient reports right > left sided low back pain that radiates to right groin and right anterior thigh. Describes the pain as aching. Pain is worsened with bending and twisting. Patient takes NSAIDS as needed for pain control. He has been working with PT. Denies any numbness, weakness, falls, foot drop, saddle anesthesia, or bladder/bowel incontinence.     Current pain: 2/10   At worst: 5/10    Past Medical History:   Diagnosis Date     Double vision      Gout      Hearing loss      Heartburn      Hypertension      Ringing in ears      Sleep difficulties      Snoring      Sore throat      Weight gain      No past surgical history on file.    Current Outpatient Medications   Medication     hydrochlorothiazide (HYDRODIURIL) 25 MG tablet     hydrocortisone (WESTCORT) 0.2 % external cream     ketoconazole (NIZORAL) 2 % external cream     lisinopril (ZESTRIL) 5 MG tablet     omeprazole (PRILOSEC) 20 MG DR capsule     propranolol (INDERAL) 40 MG tablet     QUICKVUE AT-HOME COVID-19 TEST KIT     rOPINIRole (REQUIP) 0.5 MG tablet     No current facility-administered medications for this visit.       No Known Allergies    Social History     Socioeconomic History     Marital status:    Tobacco Use     Smoking status: Some Days     Types: Cigars     Start date: 6/15/2008     Smokeless tobacco: Never     Tobacco comments:     smokes cigars, daily in afternoon   Vaping Use     Vaping Use: Never used   Substance and  "Sexual Activity     Alcohol use: Yes     Alcohol/week: 12.0 standard drinks     Types: 7 Glasses of wine, 5 Standard drinks or equivalent per week     Comment: 1-2 drinks per day, not nightly     Drug use: No     Sexual activity: Yes     Partners: Female       Family History   Problem Relation Age of Onset     Pancreatic Cancer Mother 80     Diabetes Father      Heart Disease Father      Restless Leg Syndrome Sister        ROS: 10 point ROS neg other than the symptoms noted above in the HPI.    Vital Signs: /72   Pulse 55   Ht 5' 8\" (1.727 m)   Wt 256 lb (116.1 kg)   SpO2 96%   BMI 38.92 kg/m      Physical Examination:  Constitutional:  Alert, well nourished, NAD.  HEENT: Normocephalic, atraumatic.   Pulmonary:  Without shortness of breath, normal effort.   Cardiovascular:  No pitting edema of BLE.      Neurological:  Awake  Alert  Oriented x 3  Speech clear  Cranial nerves II - XII grossly intact  PERRL  EOMI  Motor exam:  Iliopsoas  (hip flexion)               Right: 5/5  Left:  5/5  Quadriceps  (knee extension)       Right:  5/5  Left:  5/5  Hamstrings  (knee flexion)            Right:  5/5  Left:  5/5  Gastroc Soleus  (PF)                          Right:  5/5  Left:  5/5  Tibialis Ant  (DF)                          Right:  5/5  Left:  5/5  EHL                          Right:  5/5  Left:  5/5         Sensation normal to BLE     Reflexes are 2+ in the patellar and Achilles. There is no clonus.     Musculoskeletal:  Gait: Able to stand from a seated position. Normal non-antalgic, non-myelopathic gait.   No tenderness of the spine or paraspinous muscles.  Hip height is symmetrical.  Negative SI joint tenderness bilaterally.  Negative straight leg raise bilaterally.      Imaging:   CT LUMBAR SPINE W/O CONTRAST 1/6/2023 8:20 AM  Impression:  1. No evidence of acute fracture.  2. Multilevel spondylosis without high-grade spinal canal stenosis.  Moderate right neural foraminal stenosis at " L5-S1.    Assessment/Plan:   72 year old male who presents for evaluation of right > left sided low back pain that radiates to right groin and right anterior thigh. He has tried conservative measures including PT and NSAIDS. Lumbar spine CT shows moderate right foraminal stenosis at L5-S1. We discussed symptoms, imaging, and next steps. Recommend a lumbar spine MRI and RLE EMG for further evaluation. Will call patient with results and next steps.     Advised patient to call our clinic with any questions or concerns. Discussed red flag symptoms and advised to seek medical attention if these develop. Patient voiced understanding and agreement.      Megan Waterman [facility] Neurosurgery  [address] [address]  Tel [phone]  Pager [phone]

## 2023-01-30 NOTE — PATIENT INSTRUCTIONS
Order for lumbar spine MRI. You can stop at the  to schedule, or call [phone]. I will call with the results and next steps.     Order for right leg EMG (nerve test). They will call you to schedule.     Okay to continue to work with physical therapy.     Please call our clinic if symptoms persist, change, or worsen at any time.    [facility] Neurosurgery  [address] [address]  Tel [phone]

## 2023-01-30 NOTE — NURSING NOTE
"Dev Martinez is a 72 year old male who presents for:  Chief Complaint   Patient presents with     Referral     Abnormal XR of lumbar spine        Initial Vitals:  /72   Pulse 55   Ht 5' 8\" (1.727 m)   Wt 256 lb (116.1 kg)   SpO2 96%   BMI 38.92 kg/m   Estimated body mass index is 38.92 kg/m  as calculated from the following:    Height as of this encounter: 5' 8\" (1.727 m).    Weight as of this encounter: 256 lb (116.1 kg).. Body surface area is 2.36 meters squared. BP completed using cuff size: regular  Data Unavailable    Nursing Comments:     Corazon Galarza    "

## 2023-01-30 NOTE — LETTER
1/30/2023         RE: Dev Martinez  [address]  [facility] [address]        Dear Colleague,    Thank you for referring your patient, Dev Martinez, to the [facility] NEUROLOGY CLINICS [facility]. Please see a copy of my visit note below.    Dr. Wali Lopez   [facility] Neurosurgery Clinic Visit      CC: low back pain, right thigh pain     Primary Care Provider: Behzad Pisano    Reason For Visit:   I was asked by Dr. Mayer to consult on the patient for: abnormal xray of lumbar spine       HPI: Dev Martinez is a 72 year old male who presents for evaluation of chronic low back pain and acute right anterior thigh pain. Patient reports right anterior thigh pain developed a few months ago, denies any trauma or injuries at onset. Today, patient reports right > left sided low back pain that radiates to right groin and right anterior thigh. Describes the pain as aching. Pain is worsened with bending and twisting. Patient takes NSAIDS as needed for pain control. He has been working with PT. Denies any numbness, weakness, falls, foot drop, saddle anesthesia, or bladder/bowel incontinence.     Current pain: 2/10   At worst: 5/10    Past Medical History:   Diagnosis Date     Double vision      Gout      Hearing loss      Heartburn      Hypertension      Ringing in ears      Sleep difficulties      Snoring      Sore throat      Weight gain      No past surgical history on file.    Current Outpatient Medications   Medication     hydrochlorothiazide (HYDRODIURIL) 25 MG tablet     hydrocortisone (WESTCORT) 0.2 % external cream     ketoconazole (NIZORAL) 2 % external cream     lisinopril (ZESTRIL) 5 MG tablet     omeprazole (PRILOSEC) 20 MG DR capsule     propranolol (INDERAL) 40 MG tablet     QUICKVUE AT-HOME COVID-19 TEST KIT     rOPINIRole (REQUIP) 0.5 MG tablet     No current facility-administered medications for this visit.       No Known Allergies    Social History     Socioeconomic History  "    Marital status:    Tobacco Use     Smoking status: Some Days     Types: Cigars     Start date: 6/15/2008     Smokeless tobacco: Never     Tobacco comments:     smokes cigars, daily in afternoon   Vaping Use     Vaping Use: Never used   Substance and Sexual Activity     Alcohol use: Yes     Alcohol/week: 12.0 standard drinks     Types: 7 Glasses of wine, 5 Standard drinks or equivalent per week     Comment: 1-2 drinks per day, not nightly     Drug use: No     Sexual activity: Yes     Partners: Female       Family History   Problem Relation Age of Onset     Pancreatic Cancer Mother 80     Diabetes Father      Heart Disease Father      Restless Leg Syndrome Sister        ROS: 10 point ROS neg other than the symptoms noted above in the HPI.    Vital Signs: /72   Pulse 55   Ht 5' 8\" (1.727 m)   Wt 256 lb (116.1 kg)   SpO2 96%   BMI 38.92 kg/m      Physical Examination:  Constitutional:  Alert, well nourished, NAD.  HEENT: Normocephalic, atraumatic.   Pulmonary:  Without shortness of breath, normal effort.   Cardiovascular:  No pitting edema of BLE.      Neurological:  Awake  Alert  Oriented x 3  Speech clear  Cranial nerves II - XII grossly intact  PERRL  EOMI  Motor exam:  Iliopsoas  (hip flexion)               Right: 5/5  Left:  5/5  Quadriceps  (knee extension)       Right:  5/5  Left:  5/5  Hamstrings  (knee flexion)            Right:  5/5  Left:  5/5  Gastroc Soleus  (PF)                          Right:  5/5  Left:  5/5  Tibialis Ant  (DF)                          Right:  5/5  Left:  5/5  EHL                          Right:  5/5  Left:  5/5         Sensation normal to BLE     Reflexes are 2+ in the patellar and Achilles. There is no clonus.     Musculoskeletal:  Gait: Able to stand from a seated position. Normal non-antalgic, non-myelopathic gait.   No tenderness of the spine or paraspinous muscles.  Hip height is symmetrical.  Negative SI joint tenderness bilaterally.  Negative straight leg " raise bilaterally.      Imaging:   CT LUMBAR SPINE W/O CONTRAST 1/6/2023 8:20 AM  Impression:  1. No evidence of acute fracture.  2. Multilevel spondylosis without high-grade spinal canal stenosis.  Moderate right neural foraminal stenosis at L5-S1.    Assessment/Plan:   72 year old male who presents for evaluation of right > left sided low back pain that radiates to right groin and right anterior thigh. He has tried conservative measures including PT and NSAIDS. Lumbar spine CT shows moderate right foraminal stenosis at L5-S1. We discussed symptoms, imaging, and next steps. Recommend a lumbar spine MRI and RLE EMG for further evaluation. Will call patient with results and next steps.     Advised patient to call our clinic with any questions or concerns. Discussed red flag symptoms and advised to seek medical attention if these develop. Patient voiced understanding and agreement.      Megan Waterman CNP  [facility] Neurosurgery  [address] [address]  Tel [phone]  Pager [phone]          Again, thank you for allowing me to participate in the care of your patient.        Sincerely,        Megan Waterman, BLESSING

## 2023-02-01 ENCOUNTER — THERAPY VISIT (OUTPATIENT)
Dept: PHYSICAL THERAPY | Facility: CLINIC | Age: 73
End: 2023-02-01
Payer: COMMERCIAL

## 2023-02-01 DIAGNOSIS — M25.551 RIGHT HIP PAIN: ICD-10-CM

## 2023-02-01 DIAGNOSIS — G89.29 CHRONIC RIGHT-SIDED LOW BACK PAIN WITHOUT SCIATICA: Primary | ICD-10-CM

## 2023-02-01 DIAGNOSIS — M54.50 CHRONIC RIGHT-SIDED LOW BACK PAIN WITHOUT SCIATICA: Primary | ICD-10-CM

## 2023-02-01 PROCEDURE — 97110 THERAPEUTIC EXERCISES: CPT | Mod: GP | Performed by: PHYSICAL THERAPIST

## 2023-02-01 PROCEDURE — 97112 NEUROMUSCULAR REEDUCATION: CPT | Mod: GP | Performed by: PHYSICAL THERAPIST

## 2023-02-07 ENCOUNTER — TELEPHONE (OUTPATIENT)
Dept: NEUROSURGERY | Facility: CLINIC | Age: 73
End: 2023-02-07

## 2023-02-07 ENCOUNTER — ANCILLARY PROCEDURE (OUTPATIENT)
Dept: MRI IMAGING | Facility: CLINIC | Age: 73
End: 2023-02-07
Attending: NURSE PRACTITIONER
Payer: COMMERCIAL

## 2023-02-07 DIAGNOSIS — M54.16 LUMBAR RADICULOPATHY: Primary | ICD-10-CM

## 2023-02-07 DIAGNOSIS — M54.16 LUMBAR RADICULOPATHY: ICD-10-CM

## 2023-02-07 PROCEDURE — 72148 MRI LUMBAR SPINE W/O DYE: CPT | Performed by: RADIOLOGY

## 2023-02-07 NOTE — TELEPHONE ENCOUNTER
Called patient to review imaging results as stated below.     We discussed imaging, symptoms, and next steps. Patient has been working with PT. He would like to try ELIAZAR as well. Placed order with Dr. Rios. Patient is also scheduled for upcoming EMG.     Advised patient to call our clinic if symptoms persist, change, or worsen. Patient voiced understanding and agreement with this plan.      EXAM: MR LUMBAR SPINE W/O CONTRAST  2/7/2023 1:51 PM   IMPRESSION:  Multilevel lumbar degenerative changes, most pronounced at L3-4 and  L5-S1. Impingement on the foraminal right L5 nerve root.    Megan Waterman CNP  [facility] Neurosurgery  [address] [address]  Tel [phone]  Pager [phone]

## 2023-02-17 ENCOUNTER — ANCILLARY ORDERS (OUTPATIENT)
Dept: PHYSICAL MEDICINE AND REHAB | Facility: CLINIC | Age: 73
End: 2023-02-17

## 2023-02-17 DIAGNOSIS — M54.16 LUMBAR RADICULOPATHY: ICD-10-CM

## 2023-02-20 ENCOUNTER — OFFICE VISIT (OUTPATIENT)
Dept: NEUROLOGY | Facility: CLINIC | Age: 73
End: 2023-02-20
Attending: NURSE PRACTITIONER
Payer: COMMERCIAL

## 2023-02-20 DIAGNOSIS — M54.16 LUMBAR RADICULOPATHY: ICD-10-CM

## 2023-02-20 PROCEDURE — 95886 MUSC TEST DONE W/N TEST COMP: CPT | Mod: RT | Performed by: PSYCHIATRY & NEUROLOGY

## 2023-02-20 PROCEDURE — 95885 MUSC TST DONE W/NERV TST LIM: CPT | Mod: 59 | Performed by: PSYCHIATRY & NEUROLOGY

## 2023-02-20 PROCEDURE — 95909 NRV CNDJ TST 5-6 STUDIES: CPT | Performed by: PSYCHIATRY & NEUROLOGY

## 2023-02-20 NOTE — PROGRESS NOTES
[facility]  Electrodiagnostic Laboratory                 Department of Neurology                                                                                                         Test Date:  2023    Patient: Dev Martinez : 1950 Physician: Rainer Bishop MD   Sex: Male AGE: 72 year Ref Phys: Megan Waterman   ID#: [id number]   Technician: Kristy Behling     Clinical Information:  72 year old with several years of intermittent low back pain. He now also has pain in his right anterior thigh. This study is being performed to investigate for radiculopathy.     Techniques:  Motor and sensory conduction studies were done with surface recording electrodes. EMG was done with a concentric needle electrode.     Results:  Nerve conduction studies:   1. Bilateral sural and right superficial peroneal sensory responses are normal.   2. Bilateral peroneal-EDB and right tibial-AH motor responses are normal.     Needle EM. No abnormal spontaneous activity was seen in the sampled muscles.   2. Large amplitude and/or long duration motor unit potentials (MUP) were seen in the bilateral TA, right tib post and right glut medius muscles.   3. Recruitment patterns were normal.     Interpretation:  This is an abnormal study. There is electrophysiologic evidence of a chronic right-sided L5 radiculopathy. Limited needle EMG of left-sided muscles was suggestive of a similar but less severe process affecting the left side as well (e.g., chronic bilateral L5 radiculopathies). Clinical correlation is recommended.     Rainer Bishop MD  Department of Neurology      Nerve Conduction Studies  Motor Sites      Latency Amplitude Neg. Amp Diff Segment Distance Velocity Neg. Dur Neg Area Diff Temperature Comment   Site (ms) Norm (mV) Norm %  cm m/s Norm ms %  C    Left Fibular (EDB) Motor   Ankle 5.1  < 6.0 4.5  > 2.0  Ankle-EDB 8   5.3  31.4    Right Fibular (EDB) Motor   Ankle 4.9  < 6.0 6.3  > 2.0   Ankle-EDB 8   6.3  31.8    Bel Fib Head 11.9 - 5.3 - -15.9 Bel Fib Head-Ankle 27.5 39  > 38 7.1 -8.5 31.8    Pop Fossa 14.4 - 5.0 - -5.7 Pop Fossa-Bel Fib Head 11 44  > 38 6.9 2.6 31.8    Right Tibial (AHB) Motor   Ankle 4.2  < 6.5 5.6  > 5.0  Ankle-AHB 8   7.3  31.5    Knee 14.9 - 1.89 - -66.3 Knee-Ankle 42 39  > 38 7.4 -51.4 31.5      Sensory Sites      Onset Lat Peak Lat Amp (O-P) Amp (P-P) Segment Distance Velocity Temperature Comment   Site ms ms  V Norm  V  cm m/s Norm  C    Right Superficial Fibular Sensory   14 cm-Ankle 2.4 3.1 8  > 3 9 14 cm-Ankle 12.5 52  > 38 31.5    Left Sural Sensory   Calf-Lat Mall 3.1 3.6 7  > 5 6 Calf-Lat Mall 14 45  > 38 31.7    Right Sural Sensory   Calf-Lat Mall 3.6 4.2 6  > 5 17 Calf-Lat Mall - -  > 38 31.5        Electromyography     Side Muscle Ins Act Fibs/PSW Fasc HF Amp Dur Poly Recrt Int Pat   Right Vastus Lat Nml None Nml 0 Nml Nml 0 Nml Nml   Right Tib Anterior Nml None Nml 0 2+ 1+ 0 Nml Nml   Right Gastroc Nml None Nml 0 Nml Nml 0 Nml Nml   Right Tib Posterior Nml None Nml 0 1+ Nml 2+ Nml Nml   Right Gluteus Med Nml None Nml 0 Nml 1+ 1+ Nml Nml   Right Lumbo Parasp (Lower) Nml None Nml 0        Left Vastus Lat Nml None Nml 0 Nml Nml 0 Nml Nml   Left Tib Anterior Nml None Nml 0 1+ 1+ 0 Nml Nml   Left Gastroc Nml None Nml 0 Nml Nml 0 Nml Nml         NCS Waveforms:    Motor           Sensory

## 2023-02-20 NOTE — LETTER
2023       RE: Dev Martinez  [address]  [facility] [place] [address]     Dear Colleague,    Thank you for referring your patient, Dev Martinez, to the [facility] EMG CLINIC [place] at [facility]. Please see a copy of my visit note below.                        [facility]  Electrodiagnostic Laboratory                 Department of Neurology                                                                                                         Test Date:  2023    Patient: Dev Martinez : 1950 Physician: Rainer Bishop MD   Sex: Male AGE: 72 year Ref Phys: Megan Waterman   ID#: [id number]   Technician: Kristy Behling     Clinical Information:  72 year old with several years of intermittent low back pain. He now also has pain in his right anterior thigh. This study is being performed to investigate for radiculopathy.     Techniques:  Motor and sensory conduction studies were done with surface recording electrodes. EMG was done with a concentric needle electrode.     Results:  Nerve conduction studies:   1. Bilateral sural and right superficial peroneal sensory responses are normal.   2. Bilateral peroneal-EDB and right tibial-AH motor responses are normal.     Needle EM. No abnormal spontaneous activity was seen in the sampled muscles.   2. Large amplitude and/or long duration motor unit potentials (MUP) were seen in the bilateral TA, right tib post and right glut medius muscles.   3. Recruitment patterns were normal.     Interpretation:  This is an abnormal study. There is electrophysiologic evidence of a chronic right-sided L5 radiculopathy. Limited needle EMG of left-sided muscles was suggestive of a similar but less severe process affecting the left side as well (e.g., chronic bilateral L5 radiculopathies). Clinical correlation is recommended.     Rainer Bishop MD  Department of Neurology      Nerve Conduction  Studies  Motor Sites      Latency Amplitude Neg. Amp Diff Segment Distance Velocity Neg. Dur Neg Area Diff Temperature Comment   Site (ms) Norm (mV) Norm %  cm m/s Norm ms %  C    Left Fibular (EDB) Motor   Ankle 5.1  < 6.0 4.5  > 2.0  Ankle-EDB 8   5.3  31.4    Right Fibular (EDB) Motor   Ankle 4.9  < 6.0 6.3  > 2.0  Ankle-EDB 8   6.3  31.8    Bel Fib Head 11.9 - 5.3 - -15.9 Bel Fib Head-Ankle 27.5 39  > 38 7.1 -8.5 31.8    Pop Fossa 14.4 - 5.0 - -5.7 Pop Fossa-Bel Fib Head 11 44  > 38 6.9 2.6 31.8    Right Tibial (AHB) Motor   Ankle 4.2  < 6.5 5.6  > 5.0  Ankle-AHB 8   7.3  31.5    Knee 14.9 - 1.89 - -66.3 Knee-Ankle 42 39  > 38 7.4 -51.4 31.5      Sensory Sites      Onset Lat Peak Lat Amp (O-P) Amp (P-P) Segment Distance Velocity Temperature Comment   Site ms ms  V Norm  V  cm m/s Norm  C    Right Superficial Fibular Sensory   14 cm-Ankle 2.4 3.1 8  > 3 9 14 cm-Ankle 12.5 52  > 38 31.5    Left Sural Sensory   Calf-Lat Mall 3.1 3.6 7  > 5 6 Calf-Lat Mall 14 45  > 38 31.7    Right Sural Sensory   Calf-Lat Mall 3.6 4.2 6  > 5 17 Calf-Lat Mall - -  > 38 31.5        Electromyography     Side Muscle Ins Act Fibs/PSW Fasc HF Amp Dur Poly Recrt Int Pat   Right Vastus Lat Nml None Nml 0 Nml Nml 0 Nml Nml   Right Tib Anterior Nml None Nml 0 2+ 1+ 0 Nml Nml   Right Gastroc Nml None Nml 0 Nml Nml 0 Nml Nml   Right Tib Posterior Nml None Nml 0 1+ Nml 2+ Nml Nml   Right Gluteus Med Nml None Nml 0 Nml 1+ 1+ Nml Nml   Right Lumbo Parasp (Lower) Nml None Nml 0        Left Vastus Lat Nml None Nml 0 Nml Nml 0 Nml Nml   Left Tib Anterior Nml None Nml 0 1+ 1+ 0 Nml Nml   Left Gastroc Nml None Nml 0 Nml Nml 0 Nml Nml         NCS Waveforms:    Motor           Sensory                       Sincerely,    Rainer Bishop MD

## 2023-02-22 ENCOUNTER — TELEPHONE (OUTPATIENT)
Dept: NEUROSURGERY | Facility: CLINIC | Age: 73
End: 2023-02-22
Payer: COMMERCIAL

## 2023-02-22 DIAGNOSIS — Z53.9 ERRONEOUS ENCOUNTER--DISREGARD: Primary | ICD-10-CM

## 2023-02-23 ENCOUNTER — TELEPHONE (OUTPATIENT)
Dept: NEUROSURGERY | Facility: CLINIC | Age: 73
End: 2023-02-23
Payer: COMMERCIAL

## 2023-02-27 DIAGNOSIS — I10 ESSENTIAL HYPERTENSION: ICD-10-CM

## 2023-02-28 ENCOUNTER — RADIOLOGY INJECTION OFFICE VISIT (OUTPATIENT)
Dept: GENERAL RADIOLOGY | Facility: CLINIC | Age: 73
End: 2023-02-28
Payer: COMMERCIAL

## 2023-02-28 VITALS
SYSTOLIC BLOOD PRESSURE: 153 MMHG | TEMPERATURE: 98.5 F | HEART RATE: 56 BPM | RESPIRATION RATE: 16 BRPM | DIASTOLIC BLOOD PRESSURE: 78 MMHG | OXYGEN SATURATION: 98 %

## 2023-02-28 PROCEDURE — 64483 NJX AA&/STRD TFRM EPI L/S 1: CPT | Mod: RT | Performed by: PHYSICAL MEDICINE & REHABILITATION

## 2023-02-28 PROCEDURE — 64483 NJX AA&/STRD TFRM EPI L/S 1: CPT | Mod: RT

## 2023-02-28 RX ORDER — LISINOPRIL 5 MG/1
5 TABLET ORAL DAILY
Qty: 90 TABLET | Refills: 3 | Status: SHIPPED | OUTPATIENT
Start: 2023-02-28 | End: 2023-08-30

## 2023-02-28 RX ORDER — HYDROCHLOROTHIAZIDE 25 MG/1
25 TABLET ORAL DAILY
Qty: 90 TABLET | Refills: 3 | Status: SHIPPED | OUTPATIENT
Start: 2023-02-28 | End: 2023-08-30

## 2023-02-28 ASSESSMENT — PAIN SCALES - GENERAL
PAINLEVEL: NO PAIN (0)
PAINLEVEL: MILD PAIN (3)

## 2023-02-28 NOTE — NURSING NOTE
Pre-procedure Intake  If YES to any questions or NO to having a   Please complete laminated checklist and leave on the computer keyboard for Provider, verbally inform provider if able.    For SCS Trial, RFA's or any sedation procedure:  Have you been fasting? NA    If yes, for how long? n/a    Are you taking any any blood thinners such as Coumadin, Warfarin, Jantoven, Pradaxa Xarelto, Eliquis, Edoxaban, Enoxaparin, Lovenox, Heparin, Arixtra, Fondaparinux, or Fragmin? OR Antiplatelet medication such as Plavix, Brilinta, or Effient?   No     If yes, when did you take your last dose? n/a    Do you take aspirin?  No    If cervical procedure, have you held aspirin for 6 days?   NA    Do you have any allergies to contrast dye, iodine, steroid and/or numbing medications?  NO    Are you currently taking antibiotics or have an active infection?  NO    Have you had a fever/elevated temperature within the past week? NO    Are you currently taking oral steroids? NO    Do you have a ? Yes    Are you pregnant or breastfeeding?  N/A    Have you received the COVID-19 vaccine? NA    If yes, was it your 1st, 2nd or only dose needed? n/a    Date of most recent vaccine: n/a    Notify provider and RNs if systolic BP >170, diastolic BP >100, P >100 or O2 sats < 90%

## 2023-02-28 NOTE — TELEPHONE ENCOUNTER
Hydrochlorothiazide (Hydrodiuril) 25 mg + Lisinopril (Zestril) 5 mg    Last Office Visit: 1/23/23  [place] [facility] [place]  Medication last refilled:     9/1/22 #90 with 1 refill(s) - Hydrochlorothiazide   9/1/22 #90 with 1 refill(s) - Lisinopril    Vital Signs 7/8/2022 12/8/2022 2/21/2023   Systolic 124 128 137   Diastolic 77 71 79     Required labs per protocol:    LAB REF RANGE 3/11/21 7/28/22   Creatinine 0.67-1.17 mg/dL 1.1 0.95   Potassium 3.4-5.3 mmol/L 3.5 3.7   Sodium 137.3-146.3 mmol/L 135 136     Prescription approved per [facility] Refill Protocol.    Meli Grubbs, VALEN, RN, CCM

## 2023-03-02 ENCOUNTER — THERAPY VISIT (OUTPATIENT)
Dept: PHYSICAL THERAPY | Facility: CLINIC | Age: 73
End: 2023-03-02
Payer: COMMERCIAL

## 2023-03-02 DIAGNOSIS — G89.29 CHRONIC RIGHT-SIDED LOW BACK PAIN WITHOUT SCIATICA: Primary | ICD-10-CM

## 2023-03-02 DIAGNOSIS — M54.50 CHRONIC RIGHT-SIDED LOW BACK PAIN WITHOUT SCIATICA: Primary | ICD-10-CM

## 2023-03-02 DIAGNOSIS — M25.551 RIGHT HIP PAIN: ICD-10-CM

## 2023-03-02 PROCEDURE — 97112 NEUROMUSCULAR REEDUCATION: CPT | Mod: GP | Performed by: PHYSICAL THERAPIST

## 2023-03-02 PROCEDURE — 97110 THERAPEUTIC EXERCISES: CPT | Mod: GP | Performed by: PHYSICAL THERAPIST

## 2023-03-17 DIAGNOSIS — R45.1 RESTLESS ARM: ICD-10-CM

## 2023-03-20 RX ORDER — ROPINIROLE 0.5 MG/1
0.5 TABLET, FILM COATED ORAL AT BEDTIME
Qty: 90 TABLET | Refills: 1 | Status: SHIPPED | OUTPATIENT
Start: 2023-03-20 | End: 2023-04-17

## 2023-03-20 NOTE — TELEPHONE ENCOUNTER
Medication requested: rOPINIRole (REQUIP) 0.5 MG tablet  Last office visit: 1/23/2023  Future [facility] appointments:   Medication last refilled: 9/29/2022; 90 + 1 refill  Last qualifying labs:     BP Readings from Last 3 Encounters:   02/28/23 (!) 153/78   02/21/23 137/79   01/30/23 134/72     Component      Latest Ref Rng & Units 7/28/2022   Creatinine      0.67 - 1.17 mg/dL 0.95       Component      Latest Ref Rng & Units 7/26/2021   WBC      4.0 - 11.0 10e3/uL 8.7   RBC Count      4.40 - 5.90 10e6/uL 5.80   Hemoglobin      13.3 - 17.7 g/dL 17.9 (H)   Hematocrit      40.0 - 53.0 % 50.9   MCV      78 - 100 fL 88   MCH      26.5 - 33.0 pg 30.9   MCHC      31.5 - 36.5 g/dL 35.2   RDW      10.0 - 15.0 % 13.0   Platelet Count        150 - 450 10e3/uL 182     Medication is being filled for 1 time refill only due to:  Patient needs labs ALT & CBC. Future labs ordered ALT & CBC.      message sent to pt to schedule lab appt.    ALFONSO Monroe, RN  03/20/23, 10:42 AM

## 2023-03-21 ENCOUNTER — LAB (OUTPATIENT)
Dept: LAB | Facility: CLINIC | Age: 73
End: 2023-03-21
Payer: COMMERCIAL

## 2023-03-21 DIAGNOSIS — R45.1 RESTLESS ARM: ICD-10-CM

## 2023-03-21 LAB
ALT SERPL W P-5'-P-CCNC: 12 U/L (ref 10–50)
ERYTHROCYTE [DISTWIDTH] IN BLOOD BY AUTOMATED COUNT: 13.4 % (ref 10–15)
HCT VFR BLD AUTO: 46.7 % (ref 40–53)
HGB BLD-MCNC: 16.1 G/DL (ref 13.3–17.7)
MCH RBC QN AUTO: 29.3 PG (ref 26.5–33)
MCHC RBC AUTO-ENTMCNC: 34.5 G/DL (ref 31.5–36.5)
MCV RBC AUTO: 85 FL (ref 78–100)
PLATELET # BLD AUTO: 190 10E3/UL (ref 150–450)
RBC # BLD AUTO: 5.49 10E6/UL (ref 4.4–5.9)
WBC # BLD AUTO: 7.7 10E3/UL (ref 4–11)

## 2023-03-21 PROCEDURE — 84460 ALANINE AMINO (ALT) (SGPT): CPT

## 2023-04-03 ENCOUNTER — OFFICE VISIT (OUTPATIENT)
Dept: OTOLARYNGOLOGY | Facility: CLINIC | Age: 73
End: 2023-04-03
Payer: COMMERCIAL

## 2023-04-03 ENCOUNTER — MYC MEDICAL ADVICE (OUTPATIENT)
Dept: ORTHOPEDICS | Facility: CLINIC | Age: 73
End: 2023-04-03

## 2023-04-03 VITALS
SYSTOLIC BLOOD PRESSURE: 134 MMHG | OXYGEN SATURATION: 100 % | HEIGHT: 68 IN | HEART RATE: 60 BPM | BODY MASS INDEX: 38.8 KG/M2 | TEMPERATURE: 97.9 F | DIASTOLIC BLOOD PRESSURE: 80 MMHG | WEIGHT: 256 LBS

## 2023-04-03 DIAGNOSIS — H90.3 SENSORINEURAL HEARING LOSS (SNHL) OF BOTH EARS: Primary | ICD-10-CM

## 2023-04-03 PROCEDURE — 99212 OFFICE O/P EST SF 10 MIN: CPT | Performed by: OTOLARYNGOLOGY

## 2023-04-03 ASSESSMENT — PAIN SCALES - GENERAL: PAINLEVEL: NO PAIN (0)

## 2023-04-03 NOTE — LETTER
4/3/2023       RE: Dev Martinez  [address]  [facility] [address]     Dear Colleague,    Thank you for referring your patient, Dev Martinez, to the [facility] EAR NOSE AND THROAT CLINIC [place] at [facility]. Please see a copy of my visit note below.    HISTORY OF PRESENT ILLNESS:  Dev is back to see us today.  He has a history of sensorineural hearing loss and cerumen impaction secondary to external auditory canal stenosis.  He has been doing well from the standpoint of his ears.  He was last here four months ago and the plan had been to reevaluate in case he was developing cerumen impactions again.    PHYSICAL EXAMINATION:  On examination, the patient is in no distress, alert and interactive.  Breathing without difficulty or stridor.  Eyes are anicteric.  Skin of the head and neck appears normal.    Ears are examined under the microscope bilaterally.  The patient has narrow ear canals bilaterally, but no significant cerumen with normal tympanic membranes bilaterally.    ASSESSMENT:  A 73-year-old with sensorineural hearing loss, but no evidence of recurrent cerumen impactions.    PLAN:  He will followup as needed.            Again, thank you for allowing me to participate in the care of your patient.      Sincerely,    Robert Dong MD

## 2023-04-03 NOTE — PROGRESS NOTES
HISTORY OF PRESENT ILLNESS:  Dev is back to see us today.  He has a history of sensorineural hearing loss and cerumen impaction secondary to external auditory canal stenosis.  He has been doing well from the standpoint of his ears.  He was last here four months ago and the plan had been to reevaluate in case he was developing cerumen impactions again.    PHYSICAL EXAMINATION:  On examination, the patient is in no distress, alert and interactive.  Breathing without difficulty or stridor.  Eyes are anicteric.  Skin of the head and neck appears normal.    Ears are examined under the microscope bilaterally.  The patient has narrow ear canals bilaterally, but no significant cerumen with normal tympanic membranes bilaterally.    ASSESSMENT:  A 73-year-old with sensorineural hearing loss, but no evidence of recurrent cerumen impactions.    PLAN:  He will followup as needed.

## 2023-04-03 NOTE — PATIENT INSTRUCTIONS
You were seen in the ENT Clinic today by Dr. Dong. If you have any questions or concerns after your appointment, please contact us (see below)     2.   If symptoms worsen or fail to improve, please return to the ENT clinic.            How to Contact Us:  Send a [employer] message to your provider. Our team will respond to you via [employer]. Occasionally, we will need to call you to get further information.  For urgent matters (Monday-Friday), call the ENT Clinic: [phone] and speak with a call center team member - they will route your call appropriately.   If you'd like to speak directly with a nurse, please find our contact information below. We do our best to check voicemail frequently throughout the day, and will work to call you back within 1-2 days. For urgent matters, please use the general clinic phone numbers listed above.        Judy GONZALEZ RN  ENT RN Care Coordinator  Direct: [phone]  Annel ZAVALA LPN  Direct: [phone]           [facility]  Department of Otolaryngology

## 2023-04-05 ENCOUNTER — TELEPHONE (OUTPATIENT)
Dept: FAMILY MEDICINE | Facility: CLINIC | Age: 73
End: 2023-04-05

## 2023-04-05 NOTE — TELEPHONE ENCOUNTER
Dev called clinic to report his wife tested positive for Covid on Saturday 4/1 and has been taking Paxlovid for tx. He has been testing negative on rapid antigen tests at home but has developed Covid symptoms this morning - nasal congestion, mild sore throat, fatigue. Dev is fully vaccinated and had the bivalent booster in October. While Dev is eligible for Paxlovid and it can be prescribed for presumptive tx per FDA, advised Dev to wait at least one more day to see how symptoms progress and see if he tests positive. If he calls back tomorrow with worsening symptoms and/or positive test will proceed with RN Covid tx visit. Pt also asking for assistance scheduling appt for ongoing restless leg pain with Dr. Mayer - he is scheduled for 4/17 AM.    Tommy HAMILTON, RN  04/05/23 11:09 AM

## 2023-04-07 ENCOUNTER — TELEPHONE (OUTPATIENT)
Dept: FAMILY MEDICINE | Facility: CLINIC | Age: 73
End: 2023-04-07

## 2023-04-07 DIAGNOSIS — U07.1 INFECTION DUE TO 2019 NOVEL CORONAVIRUS: Primary | ICD-10-CM

## 2023-04-07 NOTE — TELEPHONE ENCOUNTER
RN COVID TREATMENT VISIT  04/07/23      The patient has been triaged and does not require a higher level of care.    Dev Martinez  73 year old  Current weight? 250 lbs    Has the patient been seen by a primary care provider at an [facility] or [facility] within the past two years? Yes.   Have you been in close proximity to/do you have a known exposure to a person with a confirmed case of influenza? No.     General treatment eligibility:  Date of positive COVID test (PCR or at home)?  4/7/23    Are you or have you been hospitalized for this COVID-19 infection? No.   Have you received monoclonal antibodies or antiviral treatment for COVID-19 since this positive test? No.   Do you have any of the following conditions that place you at risk of being very sick from COVID-19?   - Age 50 years or older  - Diabetes mellitus, type 1 and type 2  - Heart conditions such as cardiomyopathies, congenital heart defects, coronary artery disease, heart arrhythmias, heart failure, hypertension, valve disorders   - Overweight or Obesity (BMI >85th percentile or BMI 25 or higher)  Yes, patient has at least one high risk condition as noted above.     Current COVID symptoms:   - cough  - sore throat  - congestion or runny nose  Yes. Patient has at least one symptom as selected.     How many days since symptoms started? 5 days or less. Established patient, 12 years or older weighing at least 88.2 lbs, who has symptoms that started in the past 5 days, has not been hospitalized nor received treatment already, and is at risk for being very sick from COVID-19.     Treatment eligibility by RN:    Are you currently pregnant or nursing? No    Do you have a clinically significant hypersensitivity to nirmatrelvir or ritonavir, or toxic epidermal necrolysis (TEN) or Rogers-Juan Luis Syndrome? No    Do you have a history of hepatitis, any hepatic impairment on the Problem List (such as Child-Newman Class C, cirrhosis,  fatty liver disease, alcoholic liver disease), or was the last liver lab (hepatic panel, ALT, AST, ALK Phos, bilirubin) elevated in the past 6 months? No    Do you have any history of severe renal impairment (eGFR < 30mL/min)? No    Is patient eligible to continue?   Yes, patient meets all eligibility requirements for the RN COVID treatment (as denoted by all no responses above).     Current Outpatient Medications   Medication Sig Dispense Refill     hydrochlorothiazide (HYDRODIURIL) 25 MG tablet Take 1 tablet (25 mg) by mouth daily 90 tablet 3     hydrocortisone (WESTCORT) 0.2 % external cream Apply sparingly to affected area three times daily for 14 days. 30 g 1     ketoconazole (NIZORAL) 2 % external cream Apply topically 2 times daily 30 g 1     lisinopril (ZESTRIL) 5 MG tablet Take 1 tablet (5 mg) by mouth daily 90 tablet 3     omeprazole (PRILOSEC) 20 MG DR capsule Take 1 capsule (20 mg) by mouth daily 90 capsule 3     propranolol (INDERAL) 40 MG tablet Take one-half tab in the AM and one-half tab in the PM. 90 tablet 1     QUICKVUE AT-HOME COVID-19 TEST KIT See Admin Instructions       rOPINIRole (REQUIP) 0.5 MG tablet Take 1 tablet (0.5 mg) by mouth At Bedtime 90 tablet 1       Medications from List 1 of the standing order (on medications that exclude the use of Paxlovid) that patient is taking: NONE. Is patient taking [place]'s Wort? No  Is patient taking [place]'s Wort or any meds from List 1? No.   Medications from List 2 of the standing order (on meds that provider needs to adjust) that patient is taking: NONE. Is patient on any of the meds from List 2? No.   Medications from List 3 of standing order (on meds that a RN needs to adjust) that patient is taking: NONE. Is patient on any meds from List 3? No.     Paxlovid has an approximate 90% reduction in hospitalization. Paxlovid can possibly cause altered sense of taste, diarrhea (loose, watery stools), high blood pressure, muscle aches.     Would  patient like a Paxlovid prescription?   Yes.   Lab Results   Component Value Date    GFRESTIMATED 86 07/28/2022       Was last eGFR reduced? No, eGFR 60 or greater/ No Result on record. Patient can receive the normal renal function dose.    Temporary change to home medications: None    All medication adjustments (holds, etc) were discussed with the patient and patient was asked to repeat back (teachback) their med adjustment.  Did patient understand med adjustment? No medication adjustments needed.         Reviewed the following instructions with the patient:    Paxlovid (nimatrelvir and ritonavir)    How it works  Two medicines (nirmatrelvir and ritonavir) are taken together. They stop the virus from growing. Less amount of virus is easier for your body to fight.    How to take    Medicine comes in a daily container with both medicine tablets. Take by mouth twice daily (once in the morning, once at night) for 5 days.    The number of tablets to take varies by patient.    Don't chew or break capsules. Swallow whole.    When to take  Take as soon as possible after positive COVID-19 test result, and within 5 days of your first symptoms.    Possible side effects  Can cause altered sense of taste, diarrhea (loose, watery stools), high blood pressure, muscle aches.    Tommy Smith RN

## 2023-04-17 ENCOUNTER — OFFICE VISIT (OUTPATIENT)
Dept: FAMILY MEDICINE | Facility: CLINIC | Age: 73
End: 2023-04-17
Payer: COMMERCIAL

## 2023-04-17 VITALS
BODY MASS INDEX: 38.19 KG/M2 | HEART RATE: 54 BPM | TEMPERATURE: 98 F | SYSTOLIC BLOOD PRESSURE: 131 MMHG | DIASTOLIC BLOOD PRESSURE: 77 MMHG | WEIGHT: 252 LBS | OXYGEN SATURATION: 96 % | HEIGHT: 68 IN | RESPIRATION RATE: 15 BRPM

## 2023-04-17 DIAGNOSIS — G25.81 RESTLESS LEG SYNDROME: Primary | ICD-10-CM

## 2023-04-17 DIAGNOSIS — R45.1 RESTLESS ARM: ICD-10-CM

## 2023-04-17 RX ORDER — ROPINIROLE 0.5 MG/1
1 TABLET, FILM COATED ORAL AT BEDTIME
Qty: 90 TABLET | Refills: 1 | Status: SHIPPED | OUTPATIENT
Start: 2023-05-08 | End: 2023-08-17

## 2023-04-17 NOTE — NURSING NOTE
"73 year old  Chief Complaint   Patient presents with     Musculoskeletal Problem     Ongoing since April 7th, lingering, intermittent pain in knees, thighs, lower back, and glute area.        Blood pressure 131/77, pulse 54, temperature 98  F (36.7  C), temperature source Skin, resp. rate 15, height 1.727 m (5' 8\"), weight 114.3 kg (252 lb), SpO2 96 %. Body mass index is 38.32 kg/m .  Patient Active Problem List   Diagnosis     Essential hypertension     Obstructive sleep apnea on CPAP     Medicare annual wellness visit, subsequent     Left bundle branch block     Morbid obesity (H)     Bilateral sensorineural hearing loss     Acute gouty arthritis     Wears hearing aid in both ears     Type 2 diabetes mellitus without complication, without long-term current use of insulin (H)     Restless arm     Secondary insomnia     Pre-diabetes     Right hip pain     Chronic right-sided low back pain without sciatica       Wt Readings from Last 2 Encounters:   04/17/23 114.3 kg (252 lb)   04/03/23 116.1 kg (256 lb)     BP Readings from Last 3 Encounters:   04/17/23 131/77   04/03/23 134/80   02/28/23 (!) 153/78         Current Outpatient Medications   Medication     hydrochlorothiazide (HYDRODIURIL) 25 MG tablet     hydrocortisone (WESTCORT) 0.2 % external cream     ketoconazole (NIZORAL) 2 % external cream     lisinopril (ZESTRIL) 5 MG tablet     omeprazole (PRILOSEC) 20 MG DR capsule     propranolol (INDERAL) 40 MG tablet     rOPINIRole (REQUIP) 0.5 MG tablet     QUICKVUE AT-HOME COVID-19 TEST KIT     No current facility-administered medications for this visit.       Social History     Tobacco Use     Smoking status: Some Days     Types: Cigars     Start date: 6/15/2008     Smokeless tobacco: Never     Tobacco comments:     smokes cigars, daily in afternoon   Vaping Use     Vaping status: Never Used   Substance Use Topics     Alcohol use: Yes     Alcohol/week: 12.0 standard drinks of alcohol     Types: 7 Glasses of wine, 5 " Standard drinks or equivalent per week     Comment: 1-2 drinks per day, not nightly     Drug use: No       Health Maintenance Due   Topic Date Due     ADVANCE CARE PLANNING  Never done     LUNG CANCER SCREENING  Never done     EYE EXAM  02/20/2021     A1C  10/28/2022     NICOTINE/TOBACCO CESSATION COUNSELING Q 1 YR  03/30/2023       No results found for: PAP      April 17, 2023 11:07 AM

## 2023-04-17 NOTE — PATIENT INSTRUCTIONS
ASSESSMENT/PLAN:    RIGHT thigh with restless leg symptoms and some mild aching on occasion.      -Reviewed that his X-rays were normal of the femur and the hip and pelvis X-rays were essentially unchanged from 2018.   - Offered MRI imaging of thighs, but Dev agreed that it would be unlikely to change clinical recommendations. Those are: Weight loss and further physical therapy.   - Increased dose of Ropinirole to 1 mg. This is still well below the maximum of 3-4 mg/night.   Sent in new Rx which will be ready on May 8. Until then can just take 2 of your current tablets.   - For long flight, use compression stockings from pharmacy and be sure to move some on flight.     Follow-up as needed.     --Germán Mayer MD

## 2023-04-17 NOTE — PROGRESS NOTES
Medical assistant intake:  Dev Martinez is a 73 year old male who presents to [facility] today for Musculoskeletal Problem (Ongoing since April 7th, lingering, intermittent pain in knees, thighs, lower back, and glute area. )        ASSESSMENT/PLAN:    1. RIGHT thigh with restless leg symptoms and some mild aching on occasion.      -Reviewed that his X-rays were normal of the femur and the hip and pelvis X-rays were essentially unchanged from 2018.   - Offered MRI imaging of thighs, but Dev agreed that it would be unlikely to change clinical recommendations. Those are: Weight loss and further physical therapy.   - Increased dose of Ropinirole to 1 mg. This is still well below the maximum of 3-4 mg/night.   Sent in new Rx which will be ready on May 8. Until then can just take 2 of your current tablets.   - For long flight, use compression stockings from pharmacy and be sure to move some on flight.     Follow-up as needed for above and schedule annual exam.     --Germán Mayer MD      SUBJECTIVE:   Dev is here today for evaluation of his right thigh pain and also with a concern about an upcoming long flight to [place] where he is worried about restless leg syndrome.    Last seen by me with RIGHT thigh pain in Jan., 2023.    States that some parts are improving. For example, his pain has decreased. Now, just feels pain intermittently when going up stairs.   His big concern is that he's going to [place] on May 15 and he's worried about the long flight and the restless leg symptoms. He's currently on Ropinirole at 0.5 mg/night and that helped him a bit but symptoms now a bother.     Review Of Systems:    See subjective.   Has otherwise been in usual state of health, e.g.   Cardiovascular: negative  Respiratory: No shortness of breath, dyspnea on exertion, cough, or hemoptysis  Gastrointestinal: negative  Genitourinary: negative    Recently had Covid, tested positive on 4/7. Tested negative on 4/11. Now,  "feeling better.     Problem list per EMR:  Patient Active Problem List   Diagnosis     Essential hypertension     Obstructive sleep apnea on CPAP     Medicare annual wellness visit, subsequent     Left bundle branch block     Morbid obesity (H)     Bilateral sensorineural hearing loss     Acute gouty arthritis     Wears hearing aid in both ears     Type 2 diabetes mellitus without complication, without long-term current use of insulin (H)     Restless arm     Secondary insomnia     Pre-diabetes     Right hip pain     Chronic right-sided low back pain without sciatica       Current Outpatient Medications   Medication Sig Dispense Refill     hydrochlorothiazide (HYDRODIURIL) 25 MG tablet Take 1 tablet (25 mg) by mouth daily 90 tablet 3     hydrocortisone (WESTCORT) 0.2 % external cream Apply sparingly to affected area three times daily for 14 days. 30 g 1     ketoconazole (NIZORAL) 2 % external cream Apply topically 2 times daily 30 g 1     lisinopril (ZESTRIL) 5 MG tablet Take 1 tablet (5 mg) by mouth daily 90 tablet 3     omeprazole (PRILOSEC) 20 MG DR capsule Take 1 capsule (20 mg) by mouth daily 90 capsule 3     propranolol (INDERAL) 40 MG tablet Take one-half tab in the AM and one-half tab in the PM. 90 tablet 1     rOPINIRole (REQUIP) 0.5 MG tablet Take 1 tablet (0.5 mg) by mouth At Bedtime 90 tablet 1     QUICKVUE AT-HOME COVID-19 TEST KIT See Admin Instructions (Patient not taking: Reported on 4/17/2023)         No Known Allergies     Social:   Retired.  His wife works for [employer] and they fly internationally often.      OBJECTIVE    Vitals: /77 (BP Location: Right arm, Patient Position: Sitting, Cuff Size: Adult Regular)   Pulse 54   Temp 98  F (36.7  C) (Skin)   Resp 15   Ht 1.727 m (5' 8\")   Wt 114.3 kg (252 lb)   SpO2 96%   BMI 38.32 kg/m    BMI= Body mass index is 38.32 kg/m .  He appears in no distress.  He is able to rise from a seated position without difficulty and ambulates with a " slow but steady gait.    His low back has no rash and he can forward bend and go to about his ankles.    He is able to perform a straight leg raise with either leg and no particular difficulty.  His area of discomfort is in the distal right quad between the rectus femoris and the vastus lateralis just before the insertion on the patella.  There may be a slight defect in the muscles there but he is able to hold a straight leg raise without any weakness.  The defect if present is subtle.  His patellar mobility is intact and without apprehension.  In addition to performing an straight leg raise he is also able to flex his hip and knee without limitations.  Internal range of motion of his right hip produces no groin pain.    SEE TOP OF NOTE FOR ASSESSMENT AND PLAN    --Germán Mayer MD  [facility], Department of Family Medicine and Community Health

## 2023-04-22 ENCOUNTER — HEALTH MAINTENANCE LETTER (OUTPATIENT)
Age: 73
End: 2023-04-22

## 2023-05-25 DIAGNOSIS — G25.81 RESTLESS LEG SYNDROME: ICD-10-CM

## 2023-05-25 DIAGNOSIS — R45.1 RESTLESS ARM: ICD-10-CM

## 2023-05-25 RX ORDER — ROPINIROLE 0.5 MG/1
1 TABLET, FILM COATED ORAL AT BEDTIME
Qty: 90 TABLET | Refills: 1 | OUTPATIENT
Start: 2023-05-25

## 2023-05-30 PROBLEM — G89.29 CHRONIC RIGHT-SIDED LOW BACK PAIN WITHOUT SCIATICA: Status: RESOLVED | Noted: 2023-01-10 | Resolved: 2023-05-30

## 2023-05-30 PROBLEM — M54.50 CHRONIC RIGHT-SIDED LOW BACK PAIN WITHOUT SCIATICA: Status: RESOLVED | Noted: 2023-01-10 | Resolved: 2023-05-30

## 2023-05-30 PROBLEM — M25.551 RIGHT HIP PAIN: Status: RESOLVED | Noted: 2023-01-10 | Resolved: 2023-05-30

## 2023-07-13 DIAGNOSIS — K21.9 GASTROESOPHAGEAL REFLUX DISEASE WITHOUT ESOPHAGITIS: ICD-10-CM

## 2023-07-13 NOTE — TELEPHONE ENCOUNTER
Omeprazole (Prilosec) 20 mg    Last Office Visit: 4/17/23  [facility] Appointments: 8/30/23  Medication last refilled: 6/6/22 #90 with 3 refill(s)    Prescription approved per [facility] Refill Protocol.    Meli Grubbs, VALEN, RN, CCM

## 2023-08-17 DIAGNOSIS — G25.81 RESTLESS LEG SYNDROME: ICD-10-CM

## 2023-08-17 DIAGNOSIS — I10 ESSENTIAL HYPERTENSION: ICD-10-CM

## 2023-08-17 DIAGNOSIS — R45.1 RESTLESS ARM: ICD-10-CM

## 2023-08-17 RX ORDER — PROPRANOLOL HYDROCHLORIDE 40 MG/1
TABLET ORAL
Qty: 90 TABLET | Refills: 0 | Status: SHIPPED | OUTPATIENT
Start: 2023-08-17 | End: 2023-11-13

## 2023-08-17 RX ORDER — ROPINIROLE 0.5 MG/1
1 TABLET, FILM COATED ORAL AT BEDTIME
Qty: 180 TABLET | Refills: 0 | Status: SHIPPED | OUTPATIENT
Start: 2023-08-17 | End: 2023-12-27

## 2023-08-17 NOTE — TELEPHONE ENCOUNTER
Ropinirole (Requip) 0.5 mg + Propranolol    Last Office Visit: 4/17/23  [facility] Appointments: 8/30/23  Medication last refilled:     12/20/22 #90 with 1 refill(s) - Propranolol  5/8/23 #90 with 1 refill(s) - Ropinirole    Vital Signs Systolic Diastolic   4/17/23 131 77   1/30/23 137 82   4/15/22 133 71     Required labs per protocol:    LAB REF RANGE 7/28/22 3/21/23   Creatinine 0.67-1.17 mg/dL 0.95 --   ALT 10-50 U/L -- 12   HCT 40.0 - 53.0 % -- 46.7   HGB 13.3 - 17.7 g/dL -- 16.1   Platelets 15.0 - 450.0 d/uL -- 190     Medication is being filled for 1 time refill only due to:  Patient needs labs CMP and CBC. Patient needs to be seen because due for annual exam which is scheduled.    Meli Gurbbs, BSN, RN, CCM

## 2023-08-29 ASSESSMENT — ENCOUNTER SYMPTOMS
ARTHRALGIAS: 0
ABDOMINAL PAIN: 0
FREQUENCY: 0
DIARRHEA: 0
COUGH: 0
HEMATOCHEZIA: 0
FEVER: 0
HEMATURIA: 0
PALPITATIONS: 0
CONSTIPATION: 0
JOINT SWELLING: 0
NERVOUS/ANXIOUS: 0
WEAKNESS: 0
CHILLS: 0
SORE THROAT: 0
MYALGIAS: 0
NAUSEA: 0
HEARTBURN: 0
PARESTHESIAS: 0
DIZZINESS: 0
DYSURIA: 0
EYE PAIN: 0
HEADACHES: 0
SHORTNESS OF BREATH: 0

## 2023-08-29 ASSESSMENT — ACTIVITIES OF DAILY LIVING (ADL): CURRENT_FUNCTION: NO ASSISTANCE NEEDED

## 2023-08-29 NOTE — PATIENT INSTRUCTIONS
Preventive Health Recommendations:     See your health care provider every year to  Review health changes.   Discuss preventive care.    Review your medicines if your doctor has prescribed any.    Talk with your health care provider about whether you should have a test to screen for prostate cancer (PSA).  Every 3 years, have a diabetes test (fasting glucose). If you are at risk for diabetes, you should have this test more often.  Every 5 years, have a cholesterol test. Have this test more often if you are at risk for high cholesterol or heart disease.   Every 10 years, have a colonoscopy. Or, have a yearly FIT test (stool test). These exams will check for colon cancer.  Talk to with your health care provider about screening for Abdominal Aortic Aneurysm if you have a family history of AAA or have a history of smoking.    Shots:   Get a flu shot each year.   Get a tetanus shot every 10 years.   Talk to your doctor about your pneumonia vaccines. There are now two you should receive - Pneumovax (PPSV 23) and Prevnar (PCV 13).   Talk to your pharmacist about a shingles vaccine.   Talk to your doctor about the hepatitis B vaccine.  Nutrition:   Eat at least 5 servings of fruits and vegetables each day.   Eat whole-grain bread, whole-wheat pasta and brown rice instead of white grains and rice.   Get adequate Calcium and Vitamin D.   Lifestyle  Exercise for at least 150 minutes a week (30 minutes a day, 5 days a week). This will help you control your weight and prevent disease.   Limit alcohol to one drink per day.   No smoking.   Wear sunscreen to prevent skin cancer.  See your dentist every six months for an exam and cleaning.  See your eye doctor every 1 to 2 years to screen for conditions such as glaucoma, macular degeneration, cataracts, etc.    Personalized Prevention Plan  You are due for the preventive services outlined below.  Your care team is available to assist you in scheduling these services.  If you have  already completed any of these items, please share that information with your care team to update in your medical record.  Health Maintenance Due   Topic Date Due     Discuss Advance Care Planning  Never done     LUNG CANCER SCREENING  Never done     Eye Exam  02/20/2021     A1C Lab  10/28/2022     Talk to your care team about options to quit tobacco use.  03/30/2023     Basic Metabolic Panel  07/28/2023     Cholesterol Lab  07/28/2023     Kidney Microalbumin Urine Test  07/28/2023     Diabetic Foot Exam  07/28/2023     FALL RISK ASSESSMENT  07/28/2023     Flu Vaccine (1) 09/01/2023

## 2023-08-30 ENCOUNTER — OFFICE VISIT (OUTPATIENT)
Dept: FAMILY MEDICINE | Facility: CLINIC | Age: 73
End: 2023-08-30
Payer: COMMERCIAL

## 2023-08-30 VITALS
WEIGHT: 254.5 LBS | SYSTOLIC BLOOD PRESSURE: 153 MMHG | OXYGEN SATURATION: 98 % | DIASTOLIC BLOOD PRESSURE: 78 MMHG | BODY MASS INDEX: 38.7 KG/M2 | TEMPERATURE: 98.1 F | HEART RATE: 48 BPM

## 2023-08-30 DIAGNOSIS — R14.1 FLATULENCE, ERUCTATION AND GAS PAIN: ICD-10-CM

## 2023-08-30 DIAGNOSIS — Z00.00 ROUTINE GENERAL MEDICAL EXAMINATION AT A HEALTH CARE FACILITY: Primary | ICD-10-CM

## 2023-08-30 DIAGNOSIS — Z13.220 SCREENING FOR LIPID DISORDERS: ICD-10-CM

## 2023-08-30 DIAGNOSIS — R45.1 RESTLESS ARM: ICD-10-CM

## 2023-08-30 DIAGNOSIS — G25.81 RESTLESS LEGS: ICD-10-CM

## 2023-08-30 DIAGNOSIS — Z12.2 SCREENING FOR LUNG CANCER: ICD-10-CM

## 2023-08-30 DIAGNOSIS — E11.9 TYPE 2 DIABETES MELLITUS WITHOUT COMPLICATION, WITHOUT LONG-TERM CURRENT USE OF INSULIN (H): ICD-10-CM

## 2023-08-30 DIAGNOSIS — Z12.5 SCREENING FOR PROSTATE CANCER: ICD-10-CM

## 2023-08-30 DIAGNOSIS — R14.2 FLATULENCE, ERUCTATION AND GAS PAIN: ICD-10-CM

## 2023-08-30 DIAGNOSIS — I10 ESSENTIAL HYPERTENSION: ICD-10-CM

## 2023-08-30 DIAGNOSIS — R14.3 FLATULENCE, ERUCTATION AND GAS PAIN: ICD-10-CM

## 2023-08-30 DIAGNOSIS — F17.290 CIGAR SMOKER: ICD-10-CM

## 2023-08-30 LAB
ALBUMIN SERPL BCG-MCNC: 4.6 G/DL (ref 3.5–5.2)
ALP SERPL-CCNC: 58 U/L (ref 40–129)
ALT SERPL W P-5'-P-CCNC: 14 U/L (ref 0–70)
ANION GAP SERPL CALCULATED.3IONS-SCNC: 13 MMOL/L (ref 7–15)
AST SERPL W P-5'-P-CCNC: 18 U/L (ref 0–45)
BILIRUB SERPL-MCNC: 0.9 MG/DL
BUN SERPL-MCNC: 12.4 MG/DL (ref 8–23)
CALCIUM SERPL-MCNC: 9.9 MG/DL (ref 8.8–10.2)
CHLORIDE SERPL-SCNC: 99 MMOL/L (ref 98–107)
CHOLEST SERPL-MCNC: 176 MG/DL
CREAT SERPL-MCNC: 1.04 MG/DL (ref 0.67–1.17)
CREAT UR-MCNC: 106 MG/DL
DEPRECATED HCO3 PLAS-SCNC: 28 MMOL/L (ref 22–29)
ERYTHROCYTE [DISTWIDTH] IN BLOOD BY AUTOMATED COUNT: 13.5 % (ref 10–15)
GFR SERPL CREATININE-BSD FRML MDRD: 77 ML/MIN/1.73M2
GLUCOSE SERPL-MCNC: 136 MG/DL (ref 70–99)
HBA1C MFR BLD: 8 % (ref 0–5.6)
HCT VFR BLD AUTO: 47.3 % (ref 40–53)
HDLC SERPL-MCNC: 47 MG/DL
HGB BLD-MCNC: 16.4 G/DL (ref 13.3–17.7)
LDLC SERPL CALC-MCNC: 106 MG/DL
MCH RBC QN AUTO: 29.7 PG (ref 26.5–33)
MCHC RBC AUTO-ENTMCNC: 34.7 G/DL (ref 31.5–36.5)
MCV RBC AUTO: 86 FL (ref 78–100)
MICROALBUMIN UR-MCNC: <12 MG/L
MICROALBUMIN/CREAT UR: NORMAL MG/G{CREAT}
NONHDLC SERPL-MCNC: 129 MG/DL
PLATELET # BLD AUTO: 185 10E3/UL (ref 150–450)
POTASSIUM SERPL-SCNC: 3.9 MMOL/L (ref 3.4–5.3)
PROT SERPL-MCNC: 6.9 G/DL (ref 6.4–8.3)
PSA SERPL DL<=0.01 NG/ML-MCNC: 1.33 NG/ML (ref 0–6.5)
RBC # BLD AUTO: 5.52 10E6/UL (ref 4.4–5.9)
SODIUM SERPL-SCNC: 140 MMOL/L (ref 136–145)
TRIGL SERPL-MCNC: 116 MG/DL
WBC # BLD AUTO: 8.9 10E3/UL (ref 4–11)

## 2023-08-30 RX ORDER — HYDROCHLOROTHIAZIDE 25 MG/1
25 TABLET ORAL DAILY
Qty: 90 TABLET | Refills: 3 | Status: SHIPPED | OUTPATIENT
Start: 2023-08-30 | End: 2024-04-24

## 2023-08-30 RX ORDER — LISINOPRIL 5 MG/1
5 TABLET ORAL DAILY
Qty: 90 TABLET | Refills: 3 | Status: SHIPPED | OUTPATIENT
Start: 2023-08-30 | End: 2024-08-16

## 2023-08-30 RX ORDER — ROPINIROLE 2 MG/1
2 TABLET, FILM COATED ORAL AT BEDTIME
Qty: 90 TABLET | Refills: 3 | Status: SHIPPED | OUTPATIENT
Start: 2023-08-30 | End: 2024-09-05

## 2023-08-30 NOTE — PROGRESS NOTES
CHIEF COMPLAINT: Annual Wellness Exam      HISTORY OF PRESENT ILLNESS:  Dev Martinez is a 73 year old male who presents for an annual wellness visit.  Overall, he is doing well. He wears glasses and eye care is up to date. He wears hearing aids bilaterally and is happy with them. His dental care is up to date. His BP is 143/79 today. Body mass index is 38.7 kg/m . From an immunization standpoint, he is completely up to date.      He had colon cancer screening via colonoscopy on 6/22/2016, hyperplastic polyp, next due 2026. He is due for prostate cancer screening. He notes increased urinary urgency recently, but no pain with urination. Denies cloudy urine. He is interested in lung cancer screening via CT scan. He has been smoking since 2000, 1 cigar/day.    Dev has a history of prediabetes (really, T2D) with his most recent A1c of 8.6% on 7/28/2022 tipping into the diabetic range. He has cut down on his bread and alcohol intake since then. He met with our dietician, Maral Brown, in 1/2023 for consultation on nutrition and weight loss. His BP is 143/79 today and he is currently taking lisinopril 5 mg daily, hydrochlorothiazide 25 mg daily, and propranolol 20 mg twice daily. His LDL was 114 on 7/28/2022 and is not on cholesterol medication. He is not on aspirin and smokes cigars regularly, 1/day.    Dev has a history of restless legs syndrome and takes ropinirole 1 mg nightly. He reports that his restless legs are still bothersome. His ferritin level was elevated rather than low when checked in 7/2022. He states that the ropinirole was helpful when he first started it at 0.5 mg, but symptoms began to return. My colleague Dr. Nas Mayer increased his dose to 1 mg, but Dev reports he continues to have breakthrough symptoms at this dose. He states that symptoms tend to appear about 1 hour after he takes his ropinirole, but rarely also occurs during the afternoon. For example, he had some issues during a  "massage with his chiropractor. It is most bother some in his right leg, but also in his arms. He describes a sensation of an electric current or something crawling under the skin. He will get up and walk around or shake out his arms. He goes to bed at 9-9:30 PM but it can take a long time for him to get to sleep.      Dev reports increased flatulence in the last year, as well as belching and gas pain. No changes in diet.     Dev reports right knee pain and stiffness, \"like I tweaked it,\" though he cannot recall doing so.     MEDICARE PREVENTATIVE VISIT:  1. No problems with ADLs  2. No falls  3. No depression  4. Not concerned about memory loss, Mini-Cog 5/5     FAMILY, SOCIAL AND PAST SURGICAL HISTORIES: Updated      MEDICATIONS:   Carefully Reviewed      REVIEW OF SYSTEMS:  10-point review of systems negative unless otherwise stated in the HPI.       OBJECTIVE:    EXAM:    GENERAL: He is in no distress.  Affect is upbeat.   Alert and oriented x3  BP (!) 143/79 (BP Location: Right arm, Patient Position: Sitting, Cuff Size: Adult Regular)   Pulse (!) 48   Temp 98.1  F (36.7  C) (Temporal)   Wt 115.4 kg (254 lb 8 oz)   SpO2 98%   BMI 38.70 kg/m    HEENT:  Head is normocephalic, atraumatic. Ears clear bilaterally. No pain with palpation of the frontal or maxillary sinuses.  Eyes are grossly normal.  Nose is free of any congestion or discharge.  Teeth in good repair.  Mucous membranes are moist.  Throat looks benign.  Neck is supple without adenopathy or thyromegaly.  No carotid bruits are heard, no JVD.   BACK:  Smooth and straight.  No pain to percussion.  No CVA tenderness.   LUNGS:  Clear to auscultation bilaterally.   HEART:  Regular rate and rhythm without murmur.   EXTREMITIES:  Ankles free of any edema.   SKIN:  Warm and dry.   FOOT: Sensation intact in all 10 toes. Pulses are palpable. Toenails are thick, dry, and brittle.       LABORATORY DATA: Labs, pending.      ASSESSMENT AND PLAN:   Annual " Wellness Exam: Up to date with healthcare maintenance strategies.  Screening for lipid disorders: Lipid panel ordered.   Type 2 diabetes: A1c is 8.0%. Blood pressure is slightly over 140/90. LDL was 114. He is on not aspirin and is a smoker, 1 cigar/day. CMP, A1c, Albumin Random Urine Quantitative with Creat Ratio ordered. Foot exam completed today. Check BP at home.  If > 140/90, then increase lisinopril to 10 mg daily.  Also, will strongly suggest that he start taking metformin  mg once daily AND aspirin 81 mg once daily.  Recheck lab (A1c) in ~3-4 months.  Restless arm, restless legs: CBC with platelets ordered to check for anemia. Increase ropinirole to 2 mg nightly.   Essential hypertension: Refilled lisinopril and hydrochlorothiazide.   Flatulence, eructation and gas pain: Recommend trial of OTC Citrucel twice daily once back from upcoming travel.   Screening for lung cancer as he is a current cigar smoker, 1/day: CT Chest Lung Cancer Scrn Low Dose wo ordered.   Screening for prostate cancer in setting of some urinary urgency: PSA ordered.   Follow up in 1 year or call if there are any questions or concerns.       I, Lorena Perez, am serving as a scribe to document services personally performed by Dr. Behzad Pisano, based on data collection and the provider's statements to me. Dr. Pisano has reviewed, edited, and approved the above note.      I, Dr. Behzad Pisano, have interviewed and examined this patient, and I have thoroughly reviewed and edited this note and agree with its contents.

## 2023-08-30 NOTE — NURSING NOTE
73 year old    Chief Complaint   Patient presents with    Physical     No concerns            Blood pressure (!) 143/79, pulse (!) 48, temperature 98.1  F (36.7  C), temperature source Temporal, weight 115.4 kg (254 lb 8 oz), SpO2 98 %. Body mass index is 38.7 kg/m .    Patient Active Problem List   Diagnosis    Essential hypertension    Obstructive sleep apnea on CPAP    Medicare annual wellness visit, subsequent    Left bundle branch block    Morbid obesity (H)    Bilateral sensorineural hearing loss    Acute gouty arthritis    Wears hearing aid in both ears    Type 2 diabetes mellitus without complication, without long-term current use of insulin (H)    Restless arm    Secondary insomnia    Pre-diabetes              Wt Readings from Last 2 Encounters:   08/30/23 115.4 kg (254 lb 8 oz)   04/17/23 114.3 kg (252 lb)       BP Readings from Last 3 Encounters:   08/30/23 (!) 143/79   04/17/23 131/77   04/03/23 134/80                Current Outpatient Medications   Medication    hydrochlorothiazide (HYDRODIURIL) 25 MG tablet    hydrocortisone (WESTCORT) 0.2 % external cream    ketoconazole (NIZORAL) 2 % external cream    lisinopril (ZESTRIL) 5 MG tablet    omeprazole (PRILOSEC) 20 MG DR capsule    propranolol (INDERAL) 40 MG tablet    rOPINIRole (REQUIP) 0.5 MG tablet     No current facility-administered medications for this visit.              Social History     Tobacco Use    Smoking status: Every Day     Types: Cigars     Start date: 6/15/2008    Smokeless tobacco: Never    Tobacco comments:     smokes cigars, daily in afternoon   Vaping Use    Vaping Use: Never used   Substance Use Topics    Alcohol use: Yes     Alcohol/week: 7.0 standard drinks of alcohol     Types: 7 Standard drinks or equivalent per week    Drug use: No              Health Maintenance Due   Topic Date Due    ADVANCE CARE PLANNING  Never done    LUNG CANCER SCREENING  Never done    EYE EXAM  02/20/2021    A1C  10/28/2022    NICOTINE/TOBACCO  CESSATION COUNSELING Q 1 YR  03/30/2023    BMP  07/28/2023    LIPID  07/28/2023    MICROALBUMIN  07/28/2023    DIABETIC FOOT EXAM  07/28/2023            No results found for: PAP           August 30, 2023 1:42 PM

## 2023-09-01 ENCOUNTER — ANCILLARY PROCEDURE (OUTPATIENT)
Dept: CT IMAGING | Facility: CLINIC | Age: 73
End: 2023-09-01
Attending: FAMILY MEDICINE
Payer: COMMERCIAL

## 2023-09-01 DIAGNOSIS — Z12.2 SCREENING FOR LUNG CANCER: ICD-10-CM

## 2023-09-01 DIAGNOSIS — F17.290 CIGAR SMOKER: ICD-10-CM

## 2023-09-01 PROCEDURE — 71271 CT THORAX LUNG CANCER SCR C-: CPT | Mod: GC | Performed by: RADIOLOGY

## 2023-10-15 ENCOUNTER — TELEPHONE (OUTPATIENT)
Dept: SURGERY | Facility: CLINIC | Age: 73
End: 2023-10-15
Payer: COMMERCIAL

## 2023-10-15 NOTE — TELEPHONE ENCOUNTER
ENT Telephone Encounter       Patient called today after one of his hearing aid domes got stuck in his left ear canal. He is not having any ear pain or symptoms from it but would like to get it out. Given the extensive wait times at [facility] ED we discussed waiting until tomorrow for removal in clinic. He is amenable to this and will call if his symptoms change in the meantime. I will reach out to Dr. Dong' nurse as he is in clinic tomorrow and Dev plans to call clinic when they open as well.     Jeni Chu MD   ENT Resident

## 2023-10-16 ENCOUNTER — NURSE TRIAGE (OUTPATIENT)
Dept: NURSING | Facility: CLINIC | Age: 73
End: 2023-10-16

## 2023-10-16 ENCOUNTER — OFFICE VISIT (OUTPATIENT)
Dept: OTOLARYNGOLOGY | Facility: CLINIC | Age: 73
End: 2023-10-16
Payer: COMMERCIAL

## 2023-10-16 VITALS
OXYGEN SATURATION: 98 % | HEART RATE: 52 BPM | SYSTOLIC BLOOD PRESSURE: 166 MMHG | WEIGHT: 256 LBS | BODY MASS INDEX: 38.8 KG/M2 | DIASTOLIC BLOOD PRESSURE: 76 MMHG | HEIGHT: 68 IN

## 2023-10-16 DIAGNOSIS — T16.2XXA EAR FOREIGN BODY, LEFT, INITIAL ENCOUNTER: Primary | ICD-10-CM

## 2023-10-16 PROCEDURE — 69200 CLEAR OUTER EAR CANAL: CPT | Mod: LT | Performed by: OTOLARYNGOLOGY

## 2023-10-16 ASSESSMENT — PAIN SCALES - GENERAL: PAINLEVEL: NO PAIN (0)

## 2023-10-16 NOTE — PROGRESS NOTES
The patient is here to see us today for hearing aid dome stuck in his ear.  It came off when he was removing his hearing aid.  It has not been painful but irritating.    The left ear is visualized under the microscope.  There is a hearing aid dome in the canal.  This is removed.  Underlying tympanic membrane is normal.    Robert Dong MD

## 2023-10-16 NOTE — PATIENT INSTRUCTIONS
You were seen in the ENT Clinic today by Dr. Dong. If you have any questions or concerns after your appointment, please contact us (see below)     2.   Please return to the clinic as needed.              How to Contact Us:  Send a [facility] message to your provider. Our team will respond to you via [facility]. Occasionally, we will need to call you to get further information.  For urgent matters (Monday-Friday), call the ENT Clinic: [phone] and speak with a call center team member - they will route your call appropriately.   If you'd like to speak directly with a nurse, please find our contact information below. We do our best to check voicemail frequently throughout the day, and will work to call you back within 1-2 days. For urgent matters, please use the general clinic phone numbers listed above.        Judy GONZALEZ RN  ENT RN Care Coordinator  Direct: [phone]  Annel ZAVALA LPN  Direct: [phone]           [facility]  Department of Otolaryngology

## 2023-10-16 NOTE — LETTER
10/16/2023       RE: Dev Martinez  [address]  [facility] [address]     Dear Colleague,    Thank you for referring your patient, Dev Martinez, to the [facility] [place] [facility]. Please see a copy of my visit note below.    The patient is here to see us today for hearing aid dome stuck in his ear.  It came off when he was removing his hearing aid.  It has not been painful but irritating.    The left ear is visualized under the microscope.  There is a hearing aid dome in the canal.  This is removed.  Underlying tympanic membrane is normal.    Robert Dong MD

## 2023-10-16 NOTE — TELEPHONE ENCOUNTER
"    Pt calling for appt. Has portion of hearing aid  (plastic dome) stuck in LEFT ear. Happened 10-15-23. Pt state this has happen before. Denies any ear pain, drainage, dizziness, changes in facial movent/sensation.  Pt of Dr. Dong  Pt # [phone]    High priority note to ENT clinic for appt, plan/recommendation    Reason for Disposition   Caller is unable to remove the foreign body    Additional Information   Negative: MODERATE-SEVERE ear pain   Negative: Button battery   Negative: Sharp foreign body (e.g., glass, pin)   Negative: Bleeding from ear canal   Negative: Caller is unable to remove live insect    Answer Assessment - Initial Assessment Questions  1. OBJECT: \"What do you think it is?\"      Hearing aid (Martinez) plastic dome- stick in ear  Left ear-  Wife can not see but pt can feel that it is in there  2. PAIN: \"Is it causing any pain?\" If Yes, ask: \"How bad is the pain?\"  (e.g., Scale 1-10; or mild, moderate, severe)    - MILD (1-3): doesn't interfere with normal activities     - MODERATE (4-7): interferes with normal activities or awakens from sleep     - SEVERE (8-10): excruciating pain, unable to do any normal activities       Left ear, itching no pain  3. ONSET: \"How long do you think it's been in there?\"      Yesterday afternoon  4. DISCHARGE: \"Has there been any discharge or bleeding from the ear?\" If Yes, ask: \"Please describe it\". (e.g., clear, cloudy, blood)      no  5. OTHER SYMPTOMS: \"Do you have any other symptoms?\" (e.g., decreased hearing, dizziness)      Denies above,   Unable to wear hearing  6. PREGNANCY: \"Is there any chance you are pregnant?\" \"When was your last menstrual period?\"    Protocols used: Ear - Foreign Body-A-[place]    "

## 2023-11-06 ENCOUNTER — OFFICE VISIT (OUTPATIENT)
Dept: FAMILY MEDICINE | Facility: CLINIC | Age: 73
End: 2023-11-06
Payer: COMMERCIAL

## 2023-11-06 ENCOUNTER — NURSE TRIAGE (OUTPATIENT)
Dept: FAMILY MEDICINE | Facility: CLINIC | Age: 73
End: 2023-11-06

## 2023-11-06 VITALS
HEART RATE: 52 BPM | DIASTOLIC BLOOD PRESSURE: 85 MMHG | WEIGHT: 256.04 LBS | TEMPERATURE: 97.2 F | OXYGEN SATURATION: 99 % | SYSTOLIC BLOOD PRESSURE: 146 MMHG | HEIGHT: 68 IN | BODY MASS INDEX: 38.81 KG/M2

## 2023-11-06 DIAGNOSIS — R09.A2 GLOBUS SENSATION: Primary | ICD-10-CM

## 2023-11-06 PROBLEM — R73.03 PRE-DIABETES: Status: RESOLVED | Noted: 2022-08-14 | Resolved: 2023-11-06

## 2023-11-06 NOTE — PROGRESS NOTES
ASSESSMENT AND PLAN:     (R09.A2) Globus sensation  (primary encounter diagnosis)  Comment: Acute, uncomplicated.   Plan: Cause of globus include viral illness (given cervical lymphadenopathy), allergic rhinitis, reflux.   Recommended monitoring for now.  If not improving in next 3 days, start nasal fluticasone  If not improving after 2 weeks of fluticasone refer to ENT  If develops any actual difficulty swallowing, I asked Dev to reach back out right away.           Rashawn Adorno MD   [facility]  11/06/2023, 9:15 PM      SUBJECTIVE:   Dev is a 73 year old male who presents to clinic today for a return visit.    # Difficulty Swallowing  - for a few days has had a sore throat  - has also felt some sore glands on the outside of his neck  - was eating ravioli last night, in the middle of eating felt like a bite didn't go all the way down his throat  - stopped eating, didn't cough anything back up  - throat felt scratchier last night  - was able to drink water all night    - this morning before eating still had a feeling of something in his throat, especially on right side  - this morning had coffee and banana bread  - these went down okay but felt like things didn't go down as smoothly as it should    - has had intermittent hiccups the past few weeks to months  - doesn't get heart burn regularly, takes omeprazole every day for a long period  - hasn't had a reflux sensation in a long time    - when he wakes up in the morning congestion for a long period, only first thing in the morning  - will cough up some phlegm when he brushes his teeth in the morning, thick white to clear mucous  - occasional daytime cough, dry, for a long period, intermittent, worse in the morning  - no fevers/chills  - feels a little out of sorts today, but not very sick    - he has a history of type 2 diabetes since 2021  - he smokes cigars daily        Patient Active Problem List   Diagnosis    Essential hypertension  "   Obstructive sleep apnea on CPAP    Medicare annual wellness visit, subsequent    Left bundle branch block    Morbid obesity (H)    Bilateral sensorineural hearing loss    Acute gouty arthritis    Wears hearing aid in both ears    Type 2 diabetes mellitus without complication, without long-term current use of insulin (H)    Restless arm    Secondary insomnia     Current Outpatient Medications   Medication    hydrochlorothiazide (HYDRODIURIL) 25 MG tablet    hydrocortisone (WESTCORT) 0.2 % external cream    ketoconazole (NIZORAL) 2 % external cream    lisinopril (ZESTRIL) 5 MG tablet    omeprazole (PRILOSEC) 20 MG DR capsule    propranolol (INDERAL) 40 MG tablet    rOPINIRole (REQUIP) 2 MG tablet    rOPINIRole (REQUIP) 0.5 MG tablet     No current facility-administered medications for this visit.       I have reviewed the patient's relevant past medical history.     OBJECTIVE:   BP (!) 146/85   Pulse 52   Temp 97.2  F (36.2  C)   Ht 1.727 m (5' 7.99\")   Wt 116.1 kg (256 lb 0.6 oz)   SpO2 99%   BMI 38.94 kg/m      Constitutional: well-appearing, appears stated age  Eyes: conjunctivae without erythema, sclera anicteric.   Neck: bilateral superior cervical lymphadenopathy  ENT: posterior oropharynx unremarkable.     "

## 2023-11-06 NOTE — TELEPHONE ENCOUNTER
"Reason for Disposition   [1] Swallowing difficulty AND [2] cause unknown  (Exception: Difficulty swallowing is a chronic symptom.)    Answer Assessment - Initial Assessment Questions  1. DESCRIPTION: \"Tell me more about this problem.\" \"Are you  having trouble swallowing liquids, solids, or both?\" \"Any trouble with swallowing saliva (spit)?\"      Trouble swallowing both liquids and solids  2. SEVERITY: \"How bad is the swallowing difficulty?\"  (e.g., Scale 1-10; or mild, moderate, severe)    - MILD (0-3): Occasional swallowing difficulty; has trouble swallowing certain types of foods or liquids.    - MODERATE (4-7): Frequent swallowing difficulty; only able to swallow small amounts of foods and fluids.    - SEVERE (8-10): Unable to swallow any foods, fluids, or saliva; sensation of \"lump in throat\" or \"something stuck in throat\", and frequent drooling or spitting may be present.      Moderate, sensation of lump in throat  3. ONSET: \"When did the swallowing problems begin?\"       Last night  4. CAUSE: \"What do you think is causing the problem?\"  (e.g., dry mouth, food or pill stuck in throat, mouth pain, sore throat, progression of disease process such as dementia or Parkinson's disease).       \"Achey\" throat  5. CHRONIC or RECURRENT: \"Is this a new problem for you?\"  If No, ask: \"How long have you had this problem?\" (e.g., days, weeks, months)       New problem  6. OTHER SYMPTOMS: \"Do you have any other symptoms?\" (e.g., chest pain, difficulty breathing, mouth sores, sore throat, swollen tongue, chest pain)      No  7. PREGNANCY: \"Is there any chance you are pregnant?\" \"When was your last menstrual period?\"      No    Protocols used: Swallowing Difficulty-A-    Advised pt to report directly to ED if he develops any difficulty breathing. Scheduled for evaluation in clinic this afternoon with Dr. Adorno.     Tommy Smith - ALFONSO, RN  11/06/23 8:36 AM    "

## 2023-11-13 DIAGNOSIS — I10 ESSENTIAL HYPERTENSION: ICD-10-CM

## 2023-11-14 RX ORDER — PROPRANOLOL HYDROCHLORIDE 40 MG/1
TABLET ORAL
Qty: 90 TABLET | Refills: 0 | Status: SHIPPED | OUTPATIENT
Start: 2023-11-14 | End: 2024-02-12

## 2023-11-14 NOTE — TELEPHONE ENCOUNTER
Medication requested: propranolol (INDERAL) 40 MG tablet   Last office visit: 11/06/2023  [facility] appointments: none  Medication last refilled: 8/17/2023; 90 + 0 refills  Last qualifying labs:     BP Readings from Last 3 Encounters:   11/06/23 (!) 146/85   10/16/23 (!) 166/76   08/30/23 (!) 153/78     Prescription approved per [facility] Refill Protocol.    ALFONSO Monroe, RN  11/14/23, 1:02 PM

## 2023-12-02 ENCOUNTER — HEALTH MAINTENANCE LETTER (OUTPATIENT)
Age: 73
End: 2023-12-02

## 2024-02-06 ENCOUNTER — TRANSFERRED RECORDS (OUTPATIENT)
Dept: MULTI SPECIALTY CLINIC | Facility: CLINIC | Age: 74
End: 2024-02-06

## 2024-02-06 LAB — RETINOPATHY: NORMAL

## 2024-02-12 DIAGNOSIS — I10 ESSENTIAL HYPERTENSION: ICD-10-CM

## 2024-02-13 RX ORDER — PROPRANOLOL HYDROCHLORIDE 40 MG/1
TABLET ORAL
Qty: 90 TABLET | Refills: 0 | Status: SHIPPED | OUTPATIENT
Start: 2024-02-13 | End: 2024-05-14

## 2024-02-13 NOTE — TELEPHONE ENCOUNTER
Propranolol (Inderal) 40 mg    Last Office Visit: 11/6/23  [facility] Appointments: None  Medication last refilled: 11/14/23 #90 with 0 refill(s)    Vital Signs Systolic Diastolic   11/6/23 146 85   4/3/23 134 80   12/8/22 128 77     Prescription approved per [facility] Refill Protocol.    VALE BookerN, RN, CCM

## 2024-03-20 DIAGNOSIS — R45.1 RESTLESS ARM: ICD-10-CM

## 2024-03-20 DIAGNOSIS — G25.81 RESTLESS LEG SYNDROME: ICD-10-CM

## 2024-03-20 RX ORDER — ROPINIROLE 0.5 MG/1
0.5 TABLET, FILM COATED ORAL DAILY PRN
Qty: 90 TABLET | Refills: 0 | Status: SHIPPED | OUTPATIENT
Start: 2024-03-20 | End: 2024-06-17

## 2024-03-20 NOTE — TELEPHONE ENCOUNTER
Medication requested: rOPINIRole (REQUIP) 0.5 MG tablet   Last office visit: 11/6/2023  [facility] appointments: none  Medication last refilled: 12/27/2023; 90 + 0 refills  Last qualifying labs:     BP Readings from Last 3 Encounters:   11/06/23 (!) 146/85   10/16/23 (!) 166/76   08/30/23 (!) 153/78     Component      Latest Ref Rng 8/30/2023  2:44 PM   ALT      0 - 70 U/L 14        Component      Latest Ref Rng 8/30/2023  2:44 PM   Creatinine      0.67 - 1.17 mg/dL 1.04        Component      Latest Ref Rng 8/30/2023  2:44 PM   WBC      4.0 - 11.0 10e3/uL 8.9    RBC Count      4.40 - 5.90 10e6/uL 5.52    Hemoglobin      13.3 - 17.7 g/dL 16.4    Hematocrit      40.0 - 53.0 % 47.3    MCV      78 - 100 fL 86    MCH      26.5 - 33.0 pg 29.7    MCHC      31.5 - 36.5 g/dL 34.7    RDW      10.0 - 15.0 % 13.5    Platelet Count      150 - 450 10e3/uL 185      Prescription approved per [facility] Refill Protocol.    ALFONSO Monroe, RN  03/20/24, 3:04 PM

## 2024-04-14 ENCOUNTER — HEALTH MAINTENANCE LETTER (OUTPATIENT)
Age: 74
End: 2024-04-14

## 2024-04-19 NOTE — PROGRESS NOTES
Chief Complaint: Multiple questions and concerns    HPI: Dev is a 74-year-old who has not been feeling particularly well.  Last August, his A1c came back at 8%.  I had reached out to him and Dev did not respond in terms of going on a medication like metformin.  He is waking up with numbness in his fingertips.  This is happening a little bit less now than it had been recently.  His right foot often seem to have 1 toe that is a bit numb.  His second third fourth and occasionally his fifth fingers on his right hand can feel numb at times.  He often sleeps on his side and his cat sleeps in his armpit.  We discussed the possibility of pressure on his nerves with this.    He has had 2 episodes of involuntary urinary incontinence while he was driving.  He is much more thirsty than he had and he is drinking more water.  He is noted that he is typically wanting to eat saltier food.    We do good conversation of the fact that his symptoms could be polydipsia and polyuria secondary to uncontrolled type 2 diabetes. of course, we will check an A1c today and act upon that.  P Simply treating that could make him feel a lot better.  Indeed, I think that we will want to start with that, seems better he feels in 3 to 4 months, and then decide what kind of neck steps would take after that.    He is lost about 14 pounds since November.  He acknowledges that he is eating less, and he is really decreased amount of alcohol that he is consuming.  He is having a little bit more flatulence.    Finally, he is having some erectile dysfunction.  We talked with the role that elevated glucose levels can happen with that.  Again, we will try to get the glucose under control and if that does not work, he can certainly try something like Viagra.    Active medical problems: Reviewed    Current medications: Reviewed    Of note, he is already taking ropinirole for his RLS symptoms.  He is on lisinopril 5 mg daily for his blood pressure along with  hydrochlorothiazide 25 mg daily.  Blood pressure looks great today at 113/73.    Objective:    Dev is in no distress.  Affect upbeat.  Breathing comfortably.  Skin appearance is normal.    Vital signs are stable.  BP as mentioned.  O2 sats 96% on room air.  Weight is down to 242 pounds giving him a BMI of 36.8.    Laboratory studies: Basic metabolic panel, urinalysis, urine protein, TSH, and A1c, all pending      Assessment/Plan:    Dev is a 74-year-old with restless leg symptoms who symptoms may or may not be getting worse.  He is on ropinirole (Requip) but his symptoms sound a bit more carpal tunnel like.  The role that pressure in sleeping position complaint and this was discussed.    Close more importantly is the fact that he has type 2 diabetes, without complication, without long-term current use of insulin.  This is not treated at this time.  His A1c was 8% in August and he did not start medication therapy.  Will see what the A1c is today then initiate therapy.  I will see him back in 3 to 4 months.  I strongly suspect a number of his symptoms will be markedly lessened/improved by them.    Erectile dysfunction, worsening.  As indicated above, I would like to work on his blood sugar levels first.  If symptoms continue after that, will initiate medication like sildenafil.    Symptoms of fatigue, likely interwoven into the above situation.  TSH pending.    He will reach out with any questions or concerns in the meantime.      Total time spent in history gathering, chart review, order placement, consultation, and care coordination was 30 minutes.    The longitudinal plan of care for the diagnosis(es)/condition(s) as documented were addressed during this visit. Due to the added complexity in care, I will continue to support Dev in the subsequent management and with ongoing continuity of care.

## 2024-04-24 ENCOUNTER — OFFICE VISIT (OUTPATIENT)
Dept: FAMILY MEDICINE | Facility: CLINIC | Age: 74
End: 2024-04-24
Payer: COMMERCIAL

## 2024-04-24 VITALS
TEMPERATURE: 97.8 F | BODY MASS INDEX: 36.8 KG/M2 | SYSTOLIC BLOOD PRESSURE: 113 MMHG | WEIGHT: 242 LBS | DIASTOLIC BLOOD PRESSURE: 73 MMHG | RESPIRATION RATE: 16 BRPM | OXYGEN SATURATION: 96 % | HEART RATE: 64 BPM

## 2024-04-24 DIAGNOSIS — R63.1 POLYDIPSIA: ICD-10-CM

## 2024-04-24 DIAGNOSIS — E11.9 TYPE 2 DIABETES MELLITUS WITHOUT COMPLICATION, WITHOUT LONG-TERM CURRENT USE OF INSULIN (H): Primary | ICD-10-CM

## 2024-04-24 DIAGNOSIS — R53.83 OTHER FATIGUE: ICD-10-CM

## 2024-04-24 DIAGNOSIS — R35.89 POLYURIA: ICD-10-CM

## 2024-04-24 LAB
ALBUMIN UR-MCNC: NEGATIVE MG/DL
APPEARANCE UR: CLEAR
BILIRUB UR QL STRIP: NEGATIVE
COLOR UR AUTO: ABNORMAL
GLUCOSE UR STRIP-MCNC: >=1000 MG/DL
HBA1C MFR BLD: 12.1 % (ref 0–5.6)
HGB UR QL STRIP: NEGATIVE
KETONES UR STRIP-MCNC: NEGATIVE MG/DL
LEUKOCYTE ESTERASE UR QL STRIP: NEGATIVE
NITRATE UR QL: NEGATIVE
PH UR STRIP: 6 [PH] (ref 5–7)
RBC URINE: 1 /HPF
SP GR UR STRIP: 1.03 (ref 1–1.03)
UROBILINOGEN UR STRIP-MCNC: NORMAL MG/DL
WBC URINE: <1 /HPF

## 2024-04-24 NOTE — NURSING NOTE
74 year old  Chief Complaint   Patient presents with    Hypertension    Follow Up     RLS. Waking with numbness in finger tips -- happens a couple of times a month.    Incontinence     See Gui msg 4/12 -- patient reports two episodes of involuntary incontinence while driving. Notes he is often thirsty/drinking water.       Blood pressure 113/73, pulse 64, temperature 97.8  F (36.6  C), temperature source Temporal, resp. rate 16, weight 109.8 kg (242 lb), SpO2 96%. Body mass index is 36.8 kg/m .  Patient Active Problem List   Diagnosis    Essential hypertension    Obstructive sleep apnea on CPAP    Medicare annual wellness visit, subsequent    Left bundle branch block    Morbid obesity (H)    Bilateral sensorineural hearing loss    Acute gouty arthritis    Wears hearing aid in both ears    Type 2 diabetes mellitus without complication, without long-term current use of insulin (H)    Restless arm    Secondary insomnia       Wt Readings from Last 2 Encounters:   04/24/24 109.8 kg (242 lb)   11/06/23 116.1 kg (256 lb 0.6 oz)     BP Readings from Last 3 Encounters:   04/24/24 113/73   11/06/23 (!) 146/85   10/16/23 (!) 166/76         Current Outpatient Medications   Medication Sig Dispense Refill    hydrochlorothiazide (HYDRODIURIL) 25 MG tablet Take 1 tablet (25 mg) by mouth daily 90 tablet 3    hydrocortisone (WESTCORT) 0.2 % external cream Apply sparingly to affected area three times daily for 14 days. 30 g 1    ketoconazole (NIZORAL) 2 % external cream Apply topically 2 times daily 30 g 1    lisinopril (ZESTRIL) 5 MG tablet Take 1 tablet (5 mg) by mouth daily 90 tablet 3    omeprazole (PRILOSEC) 20 MG DR capsule Take 1 capsule (20 mg) by mouth daily 90 capsule 3    propranolol (INDERAL) 40 MG tablet Take one-half tab in the AM and one-half tab in the PM. 90 tablet 0    rOPINIRole (REQUIP) 0.5 MG tablet Take 1 tablet (0.5 mg) by mouth daily as needed (Restless legs) 90 tablet 0    rOPINIRole (REQUIP) 2 MG tablet  "Take 1 tablet (2 mg) by mouth At Bedtime 90 tablet 3     No current facility-administered medications for this visit.       Social History     Tobacco Use    Smoking status: Every Day     Types: Cigars     Start date: 6/15/2008    Smokeless tobacco: Never    Tobacco comments:     smokes cigars, daily in afternoon   Vaping Use    Vaping status: Never Used   Substance Use Topics    Alcohol use: Yes     Alcohol/week: 7.0 standard drinks of alcohol     Types: 7 Standard drinks or equivalent per week    Drug use: No       Health Maintenance Due   Topic Date Due    ADVANCE CARE PLANNING  Never done    EYE EXAM  02/20/2021    NICOTINE/TOBACCO CESSATION COUNSELING Q 1 YR  03/30/2023    DIABETIC FOOT EXAM  07/28/2023    A1C  11/30/2023    COVID-19 Vaccine (8 - 2023-24 season) 02/05/2024       No results found for: \"PAP\"      April 24, 2024 1:49 PM    "

## 2024-04-25 LAB
ANION GAP SERPL CALCULATED.3IONS-SCNC: 12 MMOL/L (ref 7–15)
BUN SERPL-MCNC: 16.6 MG/DL (ref 8–23)
CALCIUM SERPL-MCNC: 9.3 MG/DL (ref 8.8–10.2)
CHLORIDE SERPL-SCNC: 94 MMOL/L (ref 98–107)
CREAT SERPL-MCNC: 1.03 MG/DL (ref 0.67–1.17)
CREAT UR-MCNC: 42.2 MG/DL
DEPRECATED HCO3 PLAS-SCNC: 26 MMOL/L (ref 22–29)
EGFRCR SERPLBLD CKD-EPI 2021: 77 ML/MIN/1.73M2
GLUCOSE SERPL-MCNC: 403 MG/DL (ref 70–99)
MICROALBUMIN UR-MCNC: <12 MG/L
MICROALBUMIN/CREAT UR: NORMAL MG/G{CREAT}
POTASSIUM SERPL-SCNC: 4 MMOL/L (ref 3.4–5.3)
SODIUM SERPL-SCNC: 132 MMOL/L (ref 135–145)
TSH SERPL DL<=0.005 MIU/L-ACNC: 0.66 UIU/ML (ref 0.3–4.2)

## 2024-04-28 DIAGNOSIS — E11.9 TYPE 2 DIABETES MELLITUS WITHOUT COMPLICATION, WITHOUT LONG-TERM CURRENT USE OF INSULIN (H): Primary | ICD-10-CM

## 2024-04-28 RX ORDER — METFORMIN HCL 500 MG
500 TABLET, EXTENDED RELEASE 24 HR ORAL 2 TIMES DAILY WITH MEALS
Qty: 180 TABLET | Refills: 1 | Status: SHIPPED | OUTPATIENT
Start: 2024-04-28 | End: 2024-09-05

## 2024-05-14 DIAGNOSIS — I10 ESSENTIAL HYPERTENSION: ICD-10-CM

## 2024-05-14 RX ORDER — PROPRANOLOL HYDROCHLORIDE 40 MG/1
TABLET ORAL
Qty: 90 TABLET | Refills: 1 | Status: SHIPPED | OUTPATIENT
Start: 2024-05-14 | End: 2024-09-05

## 2024-05-14 NOTE — TELEPHONE ENCOUNTER
Medication requested: propranolol (INDERAL) 40 MG tablet   Last office visit: 4/24/24  [facility] appointments: none  Medication last refilled: 2/13/24; 90 + 0 refills  Last qualifying labs:   BP Readings from Last 3 Encounters:   04/24/24 113/73   11/06/23 (!) 146/85   10/16/23 (!) 166/76     Prescription approved per [facility] Refill Protocol.    Tommy HAMILTON, RN  05/14/24 12:56 PM

## 2024-06-03 ENCOUNTER — OFFICE VISIT (OUTPATIENT)
Dept: FAMILY MEDICINE | Facility: CLINIC | Age: 74
End: 2024-06-03
Payer: COMMERCIAL

## 2024-06-03 VITALS
OXYGEN SATURATION: 97 % | HEART RATE: 50 BPM | TEMPERATURE: 98 F | DIASTOLIC BLOOD PRESSURE: 78 MMHG | SYSTOLIC BLOOD PRESSURE: 133 MMHG | WEIGHT: 239 LBS | BODY MASS INDEX: 36.35 KG/M2

## 2024-06-03 DIAGNOSIS — E11.9 TYPE 2 DIABETES MELLITUS WITHOUT COMPLICATION, WITHOUT LONG-TERM CURRENT USE OF INSULIN (H): Primary | ICD-10-CM

## 2024-06-03 DIAGNOSIS — I10 ESSENTIAL HYPERTENSION: ICD-10-CM

## 2024-06-03 LAB — HBA1C MFR BLD: 9.3 % (ref 0–5.6)

## 2024-06-03 RX ORDER — HYDROCHLOROTHIAZIDE 25 MG/1
25 TABLET ORAL DAILY
COMMUNITY
End: 2024-06-03

## 2024-06-03 RX ORDER — HYDROCHLOROTHIAZIDE 12.5 MG/1
12.5 TABLET ORAL DAILY
COMMUNITY
End: 2024-06-14 | Stop reason: DRUGHIGH

## 2024-06-03 NOTE — NURSING NOTE
Dev  74 year old    Chief Complaint   Patient presents with    Diabetes     Recheck A1C            Blood pressure 133/78, pulse 50, temperature 98  F (36.7  C), temperature source Skin, weight 108.4 kg (239 lb), SpO2 97%. Body mass index is 36.35 kg/m .    Patient Active Problem List   Diagnosis    Essential hypertension    Obstructive sleep apnea on CPAP    Medicare annual wellness visit, subsequent    Left bundle branch block    Morbid obesity (H)    Bilateral sensorineural hearing loss    Acute gouty arthritis    Wears hearing aid in both ears    Type 2 diabetes mellitus without complication, without long-term current use of insulin (H)    Restless arm    Secondary insomnia              Wt Readings from Last 2 Encounters:   06/03/24 108.4 kg (239 lb)   04/24/24 109.8 kg (242 lb)       BP Readings from Last 3 Encounters:   06/03/24 133/78   04/24/24 113/73   11/06/23 (!) 146/85                Current Outpatient Medications   Medication Sig Dispense Refill    hydrochlorothiazide (HYDRODIURIL) 25 MG tablet Take 25 mg by mouth daily      hydroCHLOROthiazide 12.5 MG tablet Take 12.5 mg by mouth daily      hydrocortisone (WESTCORT) 0.2 % external cream Apply sparingly to affected area three times daily for 14 days. 30 g 1    ketoconazole (NIZORAL) 2 % external cream Apply topically 2 times daily 30 g 1    lisinopril (ZESTRIL) 5 MG tablet Take 1 tablet (5 mg) by mouth daily 90 tablet 3    metFORMIN (GLUCOPHAGE XR) 500 MG 24 hr tablet Take 1 tablet (500 mg) by mouth 2 times daily (with meals) 180 tablet 1    omeprazole (PRILOSEC) 20 MG DR capsule Take 1 capsule (20 mg) by mouth daily 90 capsule 3    propranolol (INDERAL) 40 MG tablet Take one-half tab in the AM and one-half tab in the PM. 90 tablet 1    rOPINIRole (REQUIP) 0.5 MG tablet Take 1 tablet (0.5 mg) by mouth daily as needed (Restless legs) 90 tablet 0    rOPINIRole (REQUIP) 2 MG tablet Take 1 tablet (2 mg) by mouth At Bedtime 90 tablet 3     No current  "facility-administered medications for this visit.              Social History     Tobacco Use    Smoking status: Every Day     Types: Cigars     Start date: 6/15/2008    Smokeless tobacco: Never    Tobacco comments:     smokes cigars, daily in afternoon   Vaping Use    Vaping status: Never Used   Substance Use Topics    Alcohol use: Yes     Alcohol/week: 7.0 standard drinks of alcohol     Types: 7 Standard drinks or equivalent per week    Drug use: No              Health Maintenance Due   Topic Date Due    ADVANCE CARE PLANNING  Never done    EYE EXAM  02/20/2021    NICOTINE/TOBACCO CESSATION COUNSELING Q 1 YR  03/30/2023    DIABETIC FOOT EXAM  07/28/2023    COVID-19 Vaccine (8 - 2023-24 season) 02/05/2024            No results found for: \"PAP\"           Mary Beth 3, 2024 11:02 AM    "

## 2024-06-14 RX ORDER — HYDROCHLOROTHIAZIDE 25 MG/1
25 TABLET ORAL DAILY
Qty: 90 TABLET | Refills: 4 | Status: SHIPPED | OUTPATIENT
Start: 2024-06-14 | End: 2024-09-05

## 2024-06-14 NOTE — PROGRESS NOTES
Chief Complaint: Diabetes follow-up    HPI: Dev is a 74-year-old who was recently diagnosed with probable type 2 diabetes.  He presented to the office complaining of increased thirst, numbness in his fingers, and increased urination.  His hemoglobin A1c was 12.1% in the context of a blood sugar that was over 300.    He has since learned that his sister has type 2 diabetes.  He is now reading a book about dietary changes including intermittent fasting that he can do.  He is employed those techniques in addition to taking metformin 500 mg twice daily with his meals.    He is very happy to report that just 6 weeks into this treatment, his thirst is decreased; he has less numbness in his fingers; and his increased urination is markedly improved.    The only thing that really has not changed that he still has ROS symptoms in his thighs.  At times, he wonders if his muscles are giving out just a little bit.  He has ropinirole available to him in both 0.5 mg and 2 mg amounts.  He will continue using that.    He has essential hypertension and blood pressure is well-controlled with a reading today of 133/78.    From a diabetic care standpoint, his A1c was checked today and it came back at 9.3%.  This is just 6 weeks into treatment and so I think that when we see him again in about 6 weeks this should be less than 8%.  That would be quite remarkable.  Blood pressure is at goal.  Previous LDL level has been at goal and we will follow that closely.  If it is not less than 100, will place on a statin.  He is not currently on aspirin but we will discuss that in more depth.  Finally, he does not smoke.    Active medical problems: Reviewed    Current medications: Reviewed    Objective:    Dev is in no distress.  Affect upbeat.  Vital signs are all stable.  We had a very good conversation about his symptoms and the marked improvement that he is experienced.    Laboratory studies: A1c, 9.3%, down from 12.1% just 6 weeks  ago      Assessment/Plan:    New onset type 2 diabetes under markedly improved control.  Continue with metformin 500 mg extended release twice daily with morning meal and evening meal.    I would like to see him back in approximately 6 weeks to see how he is doing.  Will check another A1c at that time.  Based on that result, we can determine the interval of follow-up.  I am very happy to report that basically all of his symptoms that he was experiencing have improved.    Restless legs like symptoms, variant.  Continue with ropinirole.    I look forward to seeing her back in approximately 6 weeks.      The longitudinal plan of care for the diagnosis(es)/condition(s) as documented were addressed during this visit. Due to the added complexity in care, I will continue to support Dev in the subsequent management and with ongoing continuity of care.

## 2024-06-17 DIAGNOSIS — G25.81 RESTLESS LEG SYNDROME: ICD-10-CM

## 2024-06-17 DIAGNOSIS — R45.1 RESTLESS ARM: ICD-10-CM

## 2024-06-18 RX ORDER — ROPINIROLE 0.5 MG/1
0.5 TABLET, FILM COATED ORAL DAILY PRN
Qty: 90 TABLET | Refills: 0 | Status: SHIPPED | OUTPATIENT
Start: 2024-06-18 | End: 2024-09-23

## 2024-07-09 DIAGNOSIS — K21.9 GASTROESOPHAGEAL REFLUX DISEASE WITHOUT ESOPHAGITIS: ICD-10-CM

## 2024-07-10 NOTE — TELEPHONE ENCOUNTER
Medication requested: omeprazole (PRILOSEC) 20 MG DR capsule   Last office visit: 6/3/24  [facility] appointments: 7/15/24  Medication last refilled: 7/13/23; 90 + 3 refills  Last qualifying labs: N/A    Prescription approved per [facility] Refill Protocol.    Tommy HAMILTON, RN  07/10/24 11:45 AM

## 2024-07-25 ENCOUNTER — OFFICE VISIT (OUTPATIENT)
Dept: FAMILY MEDICINE | Facility: CLINIC | Age: 74
End: 2024-07-25
Payer: COMMERCIAL

## 2024-07-25 VITALS
TEMPERATURE: 98.1 F | OXYGEN SATURATION: 96 % | DIASTOLIC BLOOD PRESSURE: 76 MMHG | HEART RATE: 51 BPM | SYSTOLIC BLOOD PRESSURE: 135 MMHG | BODY MASS INDEX: 37.11 KG/M2 | RESPIRATION RATE: 16 BRPM | WEIGHT: 244 LBS

## 2024-07-25 DIAGNOSIS — E78.5 HYPERLIPIDEMIA LDL GOAL <100: ICD-10-CM

## 2024-07-25 DIAGNOSIS — E11.9 TYPE 2 DIABETES MELLITUS WITHOUT COMPLICATION, WITHOUT LONG-TERM CURRENT USE OF INSULIN (H): Primary | ICD-10-CM

## 2024-07-25 DIAGNOSIS — M79.662 PAIN OF LEFT CALF: ICD-10-CM

## 2024-07-25 LAB
CHOLEST SERPL-MCNC: 154 MG/DL
FASTING STATUS PATIENT QL REPORTED: NO
HBA1C MFR BLD: 6.8 % (ref 0–5.6)
HDLC SERPL-MCNC: 42 MG/DL
LDLC SERPL CALC-MCNC: 88 MG/DL
NONHDLC SERPL-MCNC: 112 MG/DL
TRIGL SERPL-MCNC: 119 MG/DL

## 2024-07-25 NOTE — NURSING NOTE
Dev  74 year old    Chief Complaint   Patient presents with    Diabetes     Follow-up - 6 months after starting Metformin, no concerns            Blood pressure 135/76, pulse 51, temperature 98.1  F (36.7  C), temperature source Skin, resp. rate 16, weight 110.7 kg (244 lb), SpO2 96%. Body mass index is 37.11 kg/m .    Patient Active Problem List   Diagnosis    Essential hypertension    Obstructive sleep apnea on CPAP    Medicare annual wellness visit, subsequent    Left bundle branch block    Morbid obesity (H)    Bilateral sensorineural hearing loss    Acute gouty arthritis    Wears hearing aid in both ears    Type 2 diabetes mellitus without complication, without long-term current use of insulin (H)    Restless arm    Secondary insomnia              Wt Readings from Last 2 Encounters:   07/25/24 110.7 kg (244 lb)   06/03/24 108.4 kg (239 lb)       BP Readings from Last 3 Encounters:   07/25/24 135/76   06/03/24 133/78   04/24/24 113/73                Current Outpatient Medications   Medication Sig Dispense Refill    hydrochlorothiazide (HYDRODIURIL) 25 MG tablet Take 1 tablet (25 mg) by mouth daily 90 tablet 4    hydrocortisone (WESTCORT) 0.2 % external cream Apply sparingly to affected area three times daily for 14 days. 30 g 1    ketoconazole (NIZORAL) 2 % external cream Apply topically 2 times daily 30 g 1    lisinopril (ZESTRIL) 5 MG tablet Take 1 tablet (5 mg) by mouth daily 90 tablet 3    metFORMIN (GLUCOPHAGE XR) 500 MG 24 hr tablet Take 1 tablet (500 mg) by mouth 2 times daily (with meals) 180 tablet 1    omeprazole (PRILOSEC) 20 MG DR capsule Take 1 capsule (20 mg) by mouth daily 90 capsule 3    propranolol (INDERAL) 40 MG tablet Take one-half tab in the AM and one-half tab in the PM. 90 tablet 1    rOPINIRole (REQUIP) 0.5 MG tablet Take 1 tablet (0.5 mg) by mouth daily as needed (Restless legs) 90 tablet 0    rOPINIRole (REQUIP) 2 MG tablet Take 1 tablet (2 mg) by mouth At Bedtime 90 tablet 3     No  "current facility-administered medications for this visit.              Social History     Tobacco Use    Smoking status: Every Day     Types: Cigars     Start date: 6/15/2008    Smokeless tobacco: Never    Tobacco comments:     smokes cigars, daily in afternoon   Vaping Use    Vaping status: Never Used   Substance Use Topics    Alcohol use: Yes     Alcohol/week: 7.0 standard drinks of alcohol     Types: 7 Standard drinks or equivalent per week    Drug use: No              Health Maintenance Due   Topic Date Due    ADVANCE CARE PLANNING  Never done    NICOTINE/TOBACCO CESSATION COUNSELING Q 1 YR  03/30/2023    DIABETIC FOOT EXAM  07/28/2023    COVID-19 Vaccine (8 - 2023-24 season) 02/05/2024    LUNG CANCER SCREENING  09/01/2024            No results found for: \"PAP\"           July 25, 2024 11:33 AM    "

## 2024-07-26 ENCOUNTER — ANCILLARY PROCEDURE (OUTPATIENT)
Dept: ULTRASOUND IMAGING | Facility: CLINIC | Age: 74
End: 2024-07-26
Attending: FAMILY MEDICINE
Payer: COMMERCIAL

## 2024-07-26 DIAGNOSIS — M79.662 PAIN OF LEFT CALF: ICD-10-CM

## 2024-07-26 PROCEDURE — 93970 EXTREMITY STUDY: CPT

## 2024-08-11 NOTE — PROGRESS NOTES
"Chief Complaint: Diabetic follow-up    HPI: Dev is a 74-year-old whose had prediabetes for quite some time.  He \"tipped\" in April when his A1c went up to 12.1%.  Through the addition of metformin 500 mg 1 tablet twice daily, he brought it down to 9.3% in a matter of 6 weeks or so in [place].  Today, his A1c came back at 6.8%.    Dev is doing well.  He is not having any GI discomfort.  He has made some subtle changes to his diet.    He is decreasing the cheese that he is consuming.  He has been drinking some lactose free milk and had previously been drinking whole milk.    From a diabetic care standpoint, his A1c is at goal at 6.8%.  His blood pressure is 135/76.  Most recent LDL had been 106 but now today it came down to 88.  Therefore, he is at goal there as well.  Finally, he is not on aspirin.  He is a daily cigar but does not smoke more than that.  He has no interest in quitting.    He has a tight muscle in the back of his calf that seems to go up to the thigh.  This started about 3 weeks ago with some stretching.  He had just gotten back from a trip.    Active medical problems: Reviewed    Current medications: Reviewed    Objective:    Dev is in no distress.  Affect upbeat.  Breathing comfortably.  /76 with a heart rate of 51 and regular.  He is afebrile.  O2 sat are 96% on room air.  Body mass index is 37.11.    Examination of his left lower leg reveals no erythema or swelling.  No tenderness with palpation.  Homans' sign is negative.  Nevertheless, given that this occurred after a flight, I think we need to go ahead and get an ultrasound just to make sure that there is no DVT.  Dev agrees.    (Addendum: The ultrasound was negative for DVT.)      Assessment/Plan:    Type 2 diabetes at near ideal control.  A1c blood pressure and LDL are all at goal.  Dev continues to smoke a cigar a day and does not want to quit.  He is not on aspirin at this point would prefer not to start taking that.    I look " forward to seeing him back in approximately 6 months.  Will check an A1c at that time.  In addition, we will do a foot exam and other diabetic related markers.    He will reach out meantime with any questions or concerns.        The longitudinal plan of care for the diagnosis(es)/condition(s) as documented were addressed during this visit. Due to the added complexity in care, I will continue to support Dev in the subsequent management and with ongoing continuity of care.

## 2024-08-16 DIAGNOSIS — I10 ESSENTIAL HYPERTENSION: ICD-10-CM

## 2024-08-16 RX ORDER — LISINOPRIL 5 MG/1
5 TABLET ORAL DAILY
Qty: 90 TABLET | Refills: 1 | Status: SHIPPED | OUTPATIENT
Start: 2024-08-16 | End: 2024-09-05

## 2024-08-16 NOTE — TELEPHONE ENCOUNTER
Medication requested: lisinopril (ZESTRIL) 5 MG tablet   Last office visit: 7/25/24  [facility] appointments: 9/5/24  Medication last refilled: 8/30/23; 90 + 3 refills  Last qualifying labs:   BP Readings from Last 3 Encounters:   07/25/24 135/76   06/03/24 133/78   04/24/24 113/73     Component      Latest Ref Rng 4/24/2024  2:44 PM   Potassium      3.4 - 5.3 mmol/L 4.0      Component      Latest Ref Rng 4/24/2024  2:44 PM   GFR Estimate      >60 mL/min/1.73m2 77      Prescription approved per [facility] Refill Protocol.    Tommy HAMILTON, RN  08/16/24 2:05 PM

## 2024-09-04 ASSESSMENT — SOCIAL DETERMINANTS OF HEALTH (SDOH): HOW OFTEN DO YOU GET TOGETHER WITH FRIENDS OR RELATIVES?: ONCE A WEEK

## 2024-09-05 ENCOUNTER — OFFICE VISIT (OUTPATIENT)
Dept: FAMILY MEDICINE | Facility: CLINIC | Age: 74
End: 2024-09-05
Payer: COMMERCIAL

## 2024-09-05 VITALS
RESPIRATION RATE: 14 BRPM | TEMPERATURE: 98 F | HEIGHT: 68 IN | WEIGHT: 241 LBS | HEART RATE: 55 BPM | BODY MASS INDEX: 36.53 KG/M2 | SYSTOLIC BLOOD PRESSURE: 127 MMHG | OXYGEN SATURATION: 95 % | DIASTOLIC BLOOD PRESSURE: 77 MMHG

## 2024-09-05 DIAGNOSIS — E11.9 TYPE 2 DIABETES MELLITUS WITHOUT COMPLICATION, WITHOUT LONG-TERM CURRENT USE OF INSULIN (H): ICD-10-CM

## 2024-09-05 DIAGNOSIS — Z12.5 SCREENING FOR PROSTATE CANCER: ICD-10-CM

## 2024-09-05 DIAGNOSIS — H61.22 IMPACTED CERUMEN OF LEFT EAR: ICD-10-CM

## 2024-09-05 DIAGNOSIS — R45.1 RESTLESS ARM: ICD-10-CM

## 2024-09-05 DIAGNOSIS — R21 RASH: ICD-10-CM

## 2024-09-05 DIAGNOSIS — G25.81 RESTLESS LEGS: ICD-10-CM

## 2024-09-05 DIAGNOSIS — E87.1 HYPONATREMIA: ICD-10-CM

## 2024-09-05 DIAGNOSIS — Z00.00 MEDICARE ANNUAL WELLNESS VISIT, SUBSEQUENT: Primary | ICD-10-CM

## 2024-09-05 DIAGNOSIS — I10 ESSENTIAL HYPERTENSION: ICD-10-CM

## 2024-09-05 DIAGNOSIS — K21.9 GASTROESOPHAGEAL REFLUX DISEASE WITHOUT ESOPHAGITIS: ICD-10-CM

## 2024-09-05 DIAGNOSIS — B35.6 TINEA CRURIS: ICD-10-CM

## 2024-09-05 RX ORDER — ROPINIROLE 2 MG/1
2 TABLET, FILM COATED ORAL AT BEDTIME
Qty: 90 TABLET | Refills: 4 | Status: SHIPPED | OUTPATIENT
Start: 2024-09-05

## 2024-09-05 RX ORDER — LISINOPRIL 5 MG/1
5 TABLET ORAL DAILY
Qty: 90 TABLET | Refills: 4 | Status: SHIPPED | OUTPATIENT
Start: 2024-09-05

## 2024-09-05 RX ORDER — HYDROCHLOROTHIAZIDE 25 MG/1
25 TABLET ORAL DAILY
Qty: 90 TABLET | Refills: 4 | Status: SHIPPED | OUTPATIENT
Start: 2024-09-05

## 2024-09-05 RX ORDER — METFORMIN HCL 500 MG
500 TABLET, EXTENDED RELEASE 24 HR ORAL 2 TIMES DAILY WITH MEALS
Qty: 180 TABLET | Refills: 4 | Status: SHIPPED | OUTPATIENT
Start: 2024-09-05

## 2024-09-05 RX ORDER — KETOCONAZOLE 20 MG/G
CREAM TOPICAL 2 TIMES DAILY
Qty: 30 G | Refills: 1 | Status: SHIPPED | OUTPATIENT
Start: 2024-09-05

## 2024-09-05 RX ORDER — PROPRANOLOL HYDROCHLORIDE 40 MG/1
TABLET ORAL
Qty: 90 TABLET | Refills: 4 | Status: SHIPPED | OUTPATIENT
Start: 2024-09-05

## 2024-09-05 RX ORDER — HYDROCORTISONE VALERATE CREAM 2 MG/G
CREAM TOPICAL
Qty: 30 G | Refills: 1 | Status: SHIPPED | OUTPATIENT
Start: 2024-09-05

## 2024-09-05 NOTE — PROGRESS NOTES
"Preventive Care Visit  [facility]  Behzad Pisano MD, Family Medicine  Sep 5, 2024    Jessica Méndez is a 74 year old, presenting for the following:    HPI    Dev is a 74-year-old here today for his Medicare annual wellness visit, subsequent.  Overall, he is doing quite well.    He has type 2 diabetes without complication, without long-term current use of insulin.  At this point, he is taking metformin 500 mg by mouth twice daily.  He had an A1c done in July and his improvement with was nothing short of astonishing.  At this point, he is basically at optimal diabetic care.  A1c is less than 8%; blood pressure is less than 140/90; LDL cholesterol is at goal of less than 100; he is not taking aspirin 81 mg once daily currently; and he does not smoke.    From a care gap perspective, I simply recommend a flu shot this fall as well as COVID booster.  Otherwise, everything is up-to-date.    He wanted me to take a look at his \"scab\" on his scalp, something he noticed a few months ago.  Is not bothersome as it does not itch and it has been bleeding.           9/4/2024   General Health   How would you rate your overall physical health? Good   Feel stress (tense, anxious, or unable to sleep) Only a little      (!) STRESS CONCERN      9/4/2024   Nutrition   Diet: Regular (no restrictions)            9/4/2024   Exercise   Days per week of moderate/strenous exercise 1 day   Average minutes spent exercising at this level 50 min      (!) EXERCISE CONCERN      9/4/2024   Social Factors   Frequency of gathering with friends or relatives Once a week   Worry food won't last until get money to buy more No   Food not last or not have enough money for food? No   Do you have housing? (Housing is defined as stable permanent housing and does not include staying ouside in a car, in a tent, in an abandoned building, in an overnight shelter, or couch-surfing.) Yes   Are you worried about losing your housing? No   Lack of " transportation? No   Unable to get utilities (heat,electricity)? No            9/4/2024   Fall Risk   Fallen 2 or more times in the past year? No   Trouble with walking or balance? No             9/4/2024   Activities of Daily Living- Home Safety   Needs help with the following daily activites None of the above   Safety concerns in the home None of the above            9/4/2024   Dental   Dentist two times every year? Yes            9/4/2024   Hearing Screening   Hearing concerns? (!) I NEED TO ASK PEOPLE TO SPEAK UP OR REPEAT THEMSELVES.    (!) IT'S HARD TO FOLLOW A CONVERSATION IN A NOISY RESTAURANT OR CROWDED ROOM.       Multiple values from one day are sorted in reverse-chronological order         9/4/2024   Driving Risk Screening   Patient/family members have concerns about driving No            9/4/2024   General Alertness/Fatigue Screening   Have you been more tired than usual lately? No            9/4/2024   Urinary Incontinence Screening   Bothered by leaking urine in past 6 months No            9/4/2024   TB Screening   Were you born outside of the [place]? Yes        Today's PHQ-2 Score:       4/20/2024     9:48 AM   PHQ-2 ( 1999 Pf[employer])   Q1: Little interest or pleasure in doing things 1   Q2: Feeling down, depressed or hopeless 1   PHQ-2 Score 2   Q1: Little interest or pleasure in doing things Several days   Q2: Feeling down, depressed or hopeless Several days   PHQ-2 Score 2         9/4/2024   Substance Use   Alcohol more than 3/day or more than 7/wk No   Do you have a current opioid prescription? No   How severe/bad is pain from 1 to 10? 1/10   Do you use any other substances recreationally? No        Social History     Tobacco Use    Smoking status: Every Day     Types: Cigars     Start date: 6/15/2008    Smokeless tobacco: Never    Tobacco comments:     smokes cigars, daily in afternoon   Vaping Use    Vaping status: Never Used   Substance Use Topics    Alcohol use: Yes     Alcohol/week: 7.0 standard  drinks of alcohol     Types: 7 Standard drinks or equivalent per week    Drug use: No     Family History   Problem Relation Age of Onset    Pancreatic Cancer Mother 80    Diabetes Father     Heart Disease Father     Restless Leg Syndrome Sister      ASCVD Risk   The 10-year ASCVD risk score (Lesley DELEON, et al., 2019) is: 47.5%    Values used to calculate the score:      Age: 74 years      Sex: Male      Is Non- : No      Diabetic: Yes      Tobacco smoker: Yes      Systolic Blood Pressure: 135 mmHg      Is BP treated: Yes      HDL Cholesterol: 42 mg/dL      Total Cholesterol: 154 mg/dL    The following health maintenance items are reviewed in Epic and correct as of today:  Health Maintenance   Topic Date Due    ADVANCE CARE PLANNING  Never done    NICOTINE/TOBACCO CESSATION COUNSELING Q 1 YR  03/30/2023    DIABETIC FOOT EXAM  07/28/2023    INFLUENZA VACCINE (1) 09/01/2024    LUNG CANCER SCREENING  09/01/2024    COVID-19 Vaccine (8 - 2023-24 season) 09/01/2024    MEDICARE ANNUAL WELLNESS VISIT  08/30/2024    A1C  10/25/2024    EYE EXAM  02/06/2025    BMP  04/24/2025    MICROALBUMIN  04/24/2025    LIPID  07/25/2025    FALL RISK ASSESSMENT  09/05/2025    COLORECTAL CANCER SCREENING  06/22/2026    DTAP/TDAP/TD IMMUNIZATION (3 - Td or Tdap) 09/02/2031    HEPATITIS C SCREENING  Completed    PHQ-2 (once per calendar year)  Completed    Pneumococcal Vaccine: 65+ Years  Completed    ZOSTER IMMUNIZATION  Completed    RSV VACCINE  Completed    AORTIC ANEURYSM SCREENING (SYSTEM ASSIGNED)  Completed    HPV IMMUNIZATION  Aged Out    MENINGITIS IMMUNIZATION  Aged Out    RSV MONOCLONAL ANTIBODY  Aged Out       Review of Systems  Constitutional, neuro, ENT, endocrine, pulmonary, cardiac, gastrointestinal, genitourinary, musculoskeletal, integument and psychiatric systems are negative, except as otherwise noted.     Objective    Exam  /77 (BP Location: Left arm, Patient Position: Sitting, Cuff  "Size: Adult Large)   Pulse 55   Temp 98  F (36.7  C) (Skin)   Resp 14   Ht 1.715 m (5' 7.5\")   Wt 109.3 kg (241 lb)   SpO2 95%   BMI 37.19 kg/m       Estimated body mass index is 37.19 kg/m  as calculated from the following:    Height as of this encounter: 1.715 m (5' 7.5\").    Weight as of this encounter: 109.3 kg (241 lb).    Physical Exam  GENERAL: alert and no distress  EYES: Eyes grossly normal to inspection, PERRL and conjunctivae and sclerae normal  HENT: Right ear canal is free of any cerumen.  The TM looks normal.  On the left, however, there is complete canal blockage with cerumen; nose and mouth without ulcers or lesions  NECK: no adenopathy, no asymmetry, masses, or scars  RESP: lungs clear to auscultation - no rales, rhonchi or wheezes  CV: regular rate and rhythm, normal S1 S2, no S3 or S4, no murmur, click or rub, no peripheral edema  MS: no gross musculoskeletal defects noted, no edema  SKIN: no suspicious lesions or rashes.  The \"scab\" on the top of his head is nothing more than a classic seborrheic keratosis.  Reassurance was given.  NEURO: Normal strength and tone, mentation intact and speech normal  PSYCH: mentation appears normal, affect normal/bright  FOOT:  Monofilament testing was normal throughout all 10 toes, tops and bottoms    Laboratory studies: BMP and PSA, pending.  Lipid panel and A1c have been done in July.  Other labs have been done in April at the time of his diagnosis of type 2 diabetes.      Assessment/Plan:    Dev is a 74-year-old here today for his Medicare annual wellness visit.  He is doing extremely well.  I recommend that he get a flu shot this fall as well as a COVID booster.  Otherwise, he is completely up-to-date with all recommended health care strategies.    Relatively recent diagnosis of type 2 diabetes.  This is without complication and without long-term current use of insulin.  He is at optimal diabetic care.    Cerumen impaction, left ear.  This was " "irrigated.    \"Scab\" on scalp is simply a seborrheic keratosis.  Reassurance was given.    I look forward to seeing Dev back in approximately 6 months.  Will do that as a diabetic follow-up visit.  I will then see him again in approximately 1 year for his Medicare annual wellness visit.      Signed Electronically by: Behzad Pisano MD  "

## 2024-09-05 NOTE — NURSING NOTE
"Dev  74 year old    Chief Complaint   Patient presents with    Wellness Visit    Derm Problem     \"Scab\" on scalp, noticed a few months ago, not bothersome             Blood pressure 127/77, pulse 55, temperature 98  F (36.7  C), temperature source Skin, resp. rate 14, height 1.715 m (5' 7.5\"), weight 109.3 kg (241 lb), SpO2 95%. Body mass index is 37.19 kg/m .    Patient Active Problem List   Diagnosis    Essential hypertension    Obstructive sleep apnea on CPAP    Medicare annual wellness visit, subsequent    Left bundle branch block    Morbid obesity (H)    Bilateral sensorineural hearing loss    Acute gouty arthritis    Wears hearing aid in both ears    Type 2 diabetes mellitus without complication, without long-term current use of insulin (H)    Restless arm    Secondary insomnia              Wt Readings from Last 2 Encounters:   09/05/24 109.3 kg (241 lb)   07/25/24 110.7 kg (244 lb)       BP Readings from Last 3 Encounters:   09/05/24 127/77   07/25/24 135/76   06/03/24 133/78                Current Outpatient Medications   Medication Sig Dispense Refill    hydrochlorothiazide (HYDRODIURIL) 25 MG tablet Take 1 tablet (25 mg) by mouth daily 90 tablet 4    hydrocortisone (WESTCORT) 0.2 % external cream Apply sparingly to affected area three times daily for 14 days. 30 g 1    ketoconazole (NIZORAL) 2 % external cream Apply topically 2 times daily 30 g 1    lisinopril (ZESTRIL) 5 MG tablet Take 1 tablet (5 mg) by mouth daily 90 tablet 1    metFORMIN (GLUCOPHAGE XR) 500 MG 24 hr tablet Take 1 tablet (500 mg) by mouth 2 times daily (with meals) 180 tablet 1    omeprazole (PRILOSEC) 20 MG DR capsule Take 1 capsule (20 mg) by mouth daily 90 capsule 3    propranolol (INDERAL) 40 MG tablet Take one-half tab in the AM and one-half tab in the PM. 90 tablet 1    rOPINIRole (REQUIP) 0.5 MG tablet Take 1 tablet (0.5 mg) by mouth daily as needed (Restless legs) 90 tablet 0    rOPINIRole (REQUIP) 2 MG tablet Take 1 tablet " "(2 mg) by mouth At Bedtime 90 tablet 3     No current facility-administered medications for this visit.              Social History     Tobacco Use    Smoking status: Every Day     Types: Cigars     Start date: 6/15/2008    Smokeless tobacco: Never    Tobacco comments:     smokes cigars, daily in afternoon   Vaping Use    Vaping status: Never Used   Substance Use Topics    Alcohol use: Yes     Alcohol/week: 7.0 standard drinks of alcohol     Types: 7 Standard drinks or equivalent per week    Drug use: No              Health Maintenance Due   Topic Date Due    ADVANCE CARE PLANNING  Never done    NICOTINE/TOBACCO CESSATION COUNSELING Q 1 YR  03/30/2023    DIABETIC FOOT EXAM  07/28/2023    INFLUENZA VACCINE (1) 09/01/2024    LUNG CANCER SCREENING  09/01/2024    COVID-19 Vaccine (8 - 2023-24 season) 09/01/2024    MEDICARE ANNUAL WELLNESS VISIT  08/30/2024            No results found for: \"PAP\"           September 5, 2024 11:07 AM    "

## 2024-09-06 LAB
ANION GAP SERPL CALCULATED.3IONS-SCNC: 12 MMOL/L (ref 7–15)
BUN SERPL-MCNC: 17.2 MG/DL (ref 8–23)
CALCIUM SERPL-MCNC: 9.6 MG/DL (ref 8.8–10.4)
CHLORIDE SERPL-SCNC: 100 MMOL/L (ref 98–107)
CREAT SERPL-MCNC: 1.03 MG/DL (ref 0.67–1.17)
EGFRCR SERPLBLD CKD-EPI 2021: 77 ML/MIN/1.73M2
GLUCOSE SERPL-MCNC: 130 MG/DL (ref 70–99)
HCO3 SERPL-SCNC: 27 MMOL/L (ref 22–29)
POTASSIUM SERPL-SCNC: 4.1 MMOL/L (ref 3.4–5.3)
PSA SERPL DL<=0.01 NG/ML-MCNC: 1.53 NG/ML (ref 0–6.5)
SODIUM SERPL-SCNC: 139 MMOL/L (ref 135–145)

## 2024-09-23 DIAGNOSIS — G25.81 RESTLESS LEG SYNDROME: ICD-10-CM

## 2024-09-23 DIAGNOSIS — R45.1 RESTLESS ARM: ICD-10-CM

## 2024-09-23 RX ORDER — ROPINIROLE 0.5 MG/1
0.5 TABLET, FILM COATED ORAL DAILY PRN
Qty: 90 TABLET | Refills: 1 | Status: SHIPPED | OUTPATIENT
Start: 2024-09-23

## 2024-09-23 NOTE — TELEPHONE ENCOUNTER
Ropinirole (Requip) 0.5 mg    Last Office Visit: 9/5/24  [place] [facility] [place]  Medication last refilled: 6/18/24 #90 with 0 refill(s)    Prescription approved per [facility] Refill Protocol.    Meli Grubbs, VALEN, RN, CCM

## 2024-09-25 ENCOUNTER — OFFICE VISIT (OUTPATIENT)
Dept: FAMILY MEDICINE | Facility: CLINIC | Age: 74
End: 2024-09-25
Payer: COMMERCIAL

## 2024-09-25 VITALS
OXYGEN SATURATION: 97 % | WEIGHT: 245.04 LBS | TEMPERATURE: 97 F | HEART RATE: 57 BPM | DIASTOLIC BLOOD PRESSURE: 78 MMHG | BODY MASS INDEX: 37.14 KG/M2 | HEIGHT: 68 IN | SYSTOLIC BLOOD PRESSURE: 131 MMHG

## 2024-09-25 DIAGNOSIS — H61.22 IMPACTED CERUMEN OF LEFT EAR: Primary | ICD-10-CM

## 2024-09-25 NOTE — PROGRESS NOTES
ASSESSMENT and PLAN:       Cerumen impaction of the left ear   irrigated out in clinic as described below.    Encouraged to continue to use Debrox drops at bedtime after he removes his hearing aids.  Then wash the ears in the morning with water prior to putting back in his hearing aids.      Follow-up   As needed    Germán Mayer MD  Family Medicine and Sports Medicine  [facility]      Medical assistant intake:  Dev Martinez is a 74 year old male who presents to [facility] today for Ear Problem (Pt is requesting a left ear wash. )      SUBJECTIVE:   Dev   Has been noted to have cerumen impaction in the left ear.  He has been using eardrops but the ear still feels impacted.  He wears hearing aids.  He is here for assessment and irrigation treatment.    Review Of Systems:      Has been otherwise in his usual state of health    Problem list per EMR:  Patient Active Problem List   Diagnosis    Essential hypertension    Obstructive sleep apnea on CPAP    Medicare annual wellness visit, subsequent    Left bundle branch block    Morbid obesity (H)    Bilateral sensorineural hearing loss    Acute gouty arthritis    Wears hearing aid in both ears    Type 2 diabetes mellitus without complication, without long-term current use of insulin (H)    Restless arm    Secondary insomnia       Current Outpatient Medications   Medication Sig Dispense Refill    hydrochlorothiazide (HYDRODIURIL) 25 MG tablet Take 1 tablet (25 mg) by mouth daily. 90 tablet 4    hydrocortisone (WESTCORT) 0.2 % external cream Apply sparingly to affected area three times daily for 14 days. 30 g 1    ketoconazole (NIZORAL) 2 % external cream Apply topically 2 times daily. 30 g 1    lisinopril (ZESTRIL) 5 MG tablet Take 1 tablet (5 mg) by mouth daily. 90 tablet 4    metFORMIN (GLUCOPHAGE XR) 500 MG 24 hr tablet Take 1 tablet (500 mg) by mouth 2 times daily (with meals). 180 tablet 4    omeprazole (PRILOSEC) 20 MG DR capsule Take 1 capsule  "(20 mg) by mouth daily. 90 capsule 4    propranolol (INDERAL) 40 MG tablet Take one-half tab in the AM and one-half tab in the PM. 90 tablet 4    rOPINIRole (REQUIP) 0.5 MG tablet Take 1 tablet (0.5 mg) by mouth daily as needed (Restless legs). 90 tablet 1    rOPINIRole (REQUIP) 2 MG tablet Take 1 tablet (2 mg) by mouth at bedtime. 90 tablet 4       No Known Allergies     Social:    retired in June 2022    OBJECTIVE    Vitals: /78   Pulse 57   Temp 97  F (36.1  C)   Ht 1.715 m (5' 7.52\")   Wt 111.1 kg (245 lb 0.6 oz)   SpO2 97%   BMI 37.79 kg/m    BMI= Body mass index is 37.79 kg/m .   he appears well and in no distress.  His left ear had a large bit of impacted cerumen which was irrigated out in clinic revealing still some cerumen against the tympanic membrane but mostly clear.    Right ear appeared normal with a normal tympanic membrane.    SEE TOP OF NOTE FOR ASSESSMENT AND PLAN    --Germán Mayer MD  [facility], Department of Family Medicine and Community Health  "

## 2024-10-09 ENCOUNTER — OFFICE VISIT (OUTPATIENT)
Dept: FAMILY MEDICINE | Facility: CLINIC | Age: 74
End: 2024-10-09
Payer: COMMERCIAL

## 2024-10-09 VITALS
HEART RATE: 74 BPM | TEMPERATURE: 97.7 F | OXYGEN SATURATION: 98 % | RESPIRATION RATE: 15 BRPM | DIASTOLIC BLOOD PRESSURE: 73 MMHG | BODY MASS INDEX: 37.55 KG/M2 | SYSTOLIC BLOOD PRESSURE: 115 MMHG | WEIGHT: 243.5 LBS

## 2024-10-09 DIAGNOSIS — H61.22 IMPACTED CERUMEN OF LEFT EAR: Primary | ICD-10-CM

## 2024-10-09 DIAGNOSIS — H69.92 DYSFUNCTION OF LEFT EUSTACHIAN TUBE: ICD-10-CM

## 2024-10-09 NOTE — PROGRESS NOTES
Dev Martinez is a 74 year old male here for the following issues:    Left ear fullness  Wears hearing aids  No fever, sore throat or ear aches  Debrox drops  Can't hear as well out of left ear, concerned about wax    Patient Active Problem List   Diagnosis    Essential hypertension    Obstructive sleep apnea on CPAP    Medicare annual wellness visit, subsequent    Left bundle branch block    Morbid obesity (H)    Bilateral sensorineural hearing loss    Acute gouty arthritis    Wears hearing aid in both ears    Type 2 diabetes mellitus without complication, without long-term current use of insulin (H)    Restless arm    Secondary insomnia       Current Outpatient Medications   Medication Sig Dispense Refill    hydrochlorothiazide (HYDRODIURIL) 25 MG tablet Take 1 tablet (25 mg) by mouth daily. 90 tablet 4    hydrocortisone (WESTCORT) 0.2 % external cream Apply sparingly to affected area three times daily for 14 days. 30 g 1    ketoconazole (NIZORAL) 2 % external cream Apply topically 2 times daily. 30 g 1    lisinopril (ZESTRIL) 5 MG tablet Take 1 tablet (5 mg) by mouth daily. 90 tablet 4    metFORMIN (GLUCOPHAGE XR) 500 MG 24 hr tablet Take 1 tablet (500 mg) by mouth 2 times daily (with meals). 180 tablet 4    omeprazole (PRILOSEC) 20 MG DR capsule Take 1 capsule (20 mg) by mouth daily. 90 capsule 4    propranolol (INDERAL) 40 MG tablet Take one-half tab in the AM and one-half tab in the PM. 90 tablet 4    rOPINIRole (REQUIP) 0.5 MG tablet Take 1 tablet (0.5 mg) by mouth daily as needed (Restless legs). 90 tablet 1    rOPINIRole (REQUIP) 2 MG tablet Take 1 tablet (2 mg) by mouth at bedtime. 90 tablet 4       No Known Allergies     EXAM  /73 (BP Location: Left arm, Patient Position: Sitting, Cuff Size: Adult Large)   Pulse 74   Temp 97.7  F (36.5  C) (Temporal)   Resp 15   Wt 110.5 kg (243 lb 8 oz)   SpO2 98%   BMI 37.55 kg/m    Gen: Alert, pleasant, NAD  Right Ear: canal is patent, TM normal  Left ear:  canal occluded by cerumen    Procedure: Ear wash of left ear performed by MA  Post ear wash TM visualized, normal    Assessment:  (H61.22) Impacted cerumen of left ear  (primary encounter diagnosis)  Comment: unilateral cerumen impaction. Left side irrigated successfully  Plan: REMOVE IMPACTED CERUMEN        May use Debrox drops once a month in ears x 3-4 days, then irrigate      (H69.92) Dysfunction of left eustachian tube  Comment: left ear crackling, suspect eustachian tube dysfunction  Plan: may use flonase nasal spray on affected side, for 1-2 wks or afrin nasal spray just prior to airline travel.      Kalie Cosme MD  Internal Medicine/Pediatrics

## 2024-10-09 NOTE — NURSING NOTE
Ceruminosis is noted in the left ear.  Wax is successfully removed by syringing. Instructions for home care to prevent wax buildup are given.     This service provided today was under the supervising provider of the day Dr. Kalie Cosme, who was available if needed.    Radha Uribe LPN 4:43 PM October 9, 2024

## 2024-10-09 NOTE — PATIENT INSTRUCTIONS
Eustachian tube dysfunction    Flonase (fluticasone), 2 sprays, nasal spray to affected side    Afrin nasal spray (oxymetolazine) on day of flight if stuffed up  Don't use more than 3 days

## 2024-10-09 NOTE — NURSING NOTE
74 year old  Chief Complaint   Patient presents with    Ear Problem     Left ear plugged -- 9/25 Visit with Dr. Mayer, cerumen impaction noted.  Still noting plugged feeling on left ear.       Blood pressure 115/73, pulse 74, temperature 97.7  F (36.5  C), temperature source Temporal, resp. rate 15, weight 110.5 kg (243 lb 8 oz), SpO2 98%. Body mass index is 37.55 kg/m .  Patient Active Problem List   Diagnosis    Essential hypertension    Obstructive sleep apnea on CPAP    Medicare annual wellness visit, subsequent    Left bundle branch block    Morbid obesity (H)    Bilateral sensorineural hearing loss    Acute gouty arthritis    Wears hearing aid in both ears    Type 2 diabetes mellitus without complication, without long-term current use of insulin (H)    Restless arm    Secondary insomnia       Wt Readings from Last 2 Encounters:   10/09/24 110.5 kg (243 lb 8 oz)   09/25/24 111.1 kg (245 lb 0.6 oz)     BP Readings from Last 3 Encounters:   10/09/24 115/73   09/25/24 131/78   09/05/24 127/77         Current Outpatient Medications   Medication Sig Dispense Refill    hydrochlorothiazide (HYDRODIURIL) 25 MG tablet Take 1 tablet (25 mg) by mouth daily. 90 tablet 4    hydrocortisone (WESTCORT) 0.2 % external cream Apply sparingly to affected area three times daily for 14 days. 30 g 1    ketoconazole (NIZORAL) 2 % external cream Apply topically 2 times daily. 30 g 1    lisinopril (ZESTRIL) 5 MG tablet Take 1 tablet (5 mg) by mouth daily. 90 tablet 4    metFORMIN (GLUCOPHAGE XR) 500 MG 24 hr tablet Take 1 tablet (500 mg) by mouth 2 times daily (with meals). 180 tablet 4    omeprazole (PRILOSEC) 20 MG DR capsule Take 1 capsule (20 mg) by mouth daily. 90 capsule 4    propranolol (INDERAL) 40 MG tablet Take one-half tab in the AM and one-half tab in the PM. 90 tablet 4    rOPINIRole (REQUIP) 0.5 MG tablet Take 1 tablet (0.5 mg) by mouth daily as needed (Restless legs). 90 tablet 1    rOPINIRole (REQUIP) 2 MG tablet Take  "1 tablet (2 mg) by mouth at bedtime. 90 tablet 4     No current facility-administered medications for this visit.       Social History     Tobacco Use    Smoking status: Every Day     Types: Cigars     Start date: 6/15/2008    Smokeless tobacco: Never    Tobacco comments:     smokes cigars, daily in afternoon   Vaping Use    Vaping status: Never Used   Substance Use Topics    Alcohol use: Yes     Alcohol/week: 7.0 standard drinks of alcohol     Types: 7 Standard drinks or equivalent per week    Drug use: No       Health Maintenance Due   Topic Date Due    ADVANCE CARE PLANNING  Never done       No results found for: \"PAP\"      October 9, 2024 4:43 PM    "

## 2024-11-10 ENCOUNTER — HEALTH MAINTENANCE LETTER (OUTPATIENT)
Age: 74
End: 2024-11-10

## 2024-11-13 ENCOUNTER — TELEPHONE (OUTPATIENT)
Dept: FAMILY MEDICINE | Facility: CLINIC | Age: 74
End: 2024-11-13

## 2024-11-13 DIAGNOSIS — E11.9 TYPE 2 DIABETES MELLITUS WITHOUT COMPLICATION, WITHOUT LONG-TERM CURRENT USE OF INSULIN (H): ICD-10-CM

## 2024-11-13 NOTE — TELEPHONE ENCOUNTER
Lab order placed and asked Dev to call and schedule a lab only appointment.  VALE BookerN, RN, CCM  RN Care Coordinator  [facility]  11/13/24  5:26 PM  Phone: [phone]

## 2024-11-13 NOTE — TELEPHONE ENCOUNTER
Who is calling? Patient  What do they need? A1C order  Is this an insurance referral? No  Does caller need a call back? No  Additional Comments: States he received a message to schedule A1C lab. (I cannot find this message).

## 2024-11-15 ENCOUNTER — LAB (OUTPATIENT)
Dept: LAB | Facility: CLINIC | Age: 74
End: 2024-11-15
Payer: COMMERCIAL

## 2024-11-15 DIAGNOSIS — E11.9 TYPE 2 DIABETES MELLITUS WITHOUT COMPLICATION, WITHOUT LONG-TERM CURRENT USE OF INSULIN (H): ICD-10-CM

## 2024-11-15 LAB
EST. AVERAGE GLUCOSE BLD GHB EST-MCNC: 143 MG/DL
HBA1C MFR BLD: 6.6 % (ref 0–5.6)

## 2025-02-06 ENCOUNTER — TRANSFERRED RECORDS (OUTPATIENT)
Dept: MULTI SPECIALTY CLINIC | Facility: CLINIC | Age: 75
End: 2025-02-06

## 2025-02-06 LAB — RETINOPATHY: NORMAL

## 2025-02-13 ENCOUNTER — DOCUMENTATION ONLY (OUTPATIENT)
Dept: SLEEP MEDICINE | Facility: CLINIC | Age: 75
End: 2025-02-13
Payer: COMMERCIAL

## 2025-02-13 DIAGNOSIS — G47.33 OBSTRUCTIVE SLEEP APNEA (ADULT) (PEDIATRIC): Primary | ICD-10-CM

## 2025-02-21 ENCOUNTER — MYC MEDICAL ADVICE (OUTPATIENT)
Dept: FAMILY MEDICINE | Facility: CLINIC | Age: 75
End: 2025-02-21

## 2025-02-21 DIAGNOSIS — L02.222 BOIL, BACK: Primary | ICD-10-CM

## 2025-02-24 NOTE — TELEPHONE ENCOUNTER
Dermatology referral faxed to [facility] at fax:  [phone].    VALE BookerN, RN, CCM  RN Care Coordinator  [facility]  02/24/25  12:08 PM  Phone: [phone]

## 2025-02-26 NOTE — PROGRESS NOTES
Referring provider: Self    Dev Martinez  is a 67 year old male presents today for follow-up nutrition consultation.  The holidays were not perfect for nutrition but better than he previously would have ate. Did not feel extremely full after sky dinner. Does not feel as much hunger and is able to tell when he needs to stop eating.    Vitals:  Wt 120.7 kg (266 lb 1.3 oz)  BMI 40.28 kg/m2   Wt Readings from Last 4 Encounters:   01/15/18 120.7 kg (266 lb 1.3 oz)   12/15/17 121.2 kg (267 lb 4.8 oz)   11/20/17 122 kg (269 lb)   11/06/17 122.6 kg (270 lb 4 oz)       Nutrition History  Patient is on a regular  diet at home.  Recall:  6:00 am: Slice of Burkinan soda bread, 1 tbsp peanut butter, tea  8:15 am: 1/2 pint fruit, chobani yogurt, 1/2 c granola, nuts  9:30: 1/2 packet [place] crunchy bar  Noon: 1/2 fist Roasted potatoes, roasted root veggie, pork loin, 1/2 pint fruit  1400: popcorn  1800; Burrito with grilled chicken, guacamole, cheese, lettuce, refried beans, 8 in tortilla  Snack: small cookie  ETOH (1 drink = 12 oz beer, 5 oz wine, 1.5 oz liquor): wine 5-6 times per week    Physical Activity  standing at desk.  Sometime walk in the afternoon.   Steps per day varied from 4342-2925.    Time with Patient:  30 Minutes    Nutrition  DX:.   1. Dietary counseling    2. Obesity, unspecified obesity severity, unspecified obesity type        Nutrition Goals:   1:  5000 steps minimum of walking every day.   2:  Continue meal pattern that he established int the last month.   3: start practicing intuitive eating: pause at 1/2 meal and decide if satisfied, then 3/4 of the meal. Try with one meal each day.   Long term goals:  1:  Initial weight loss goal to lose 5-10% weight via lifestyle changes.     Jade Haas, MS, RD, CSSD, LD  [facility]       
4 = No assist / stand by assistance

## 2025-03-02 ENCOUNTER — HEALTH MAINTENANCE LETTER (OUTPATIENT)
Age: 75
End: 2025-03-02

## 2025-03-20 ENCOUNTER — OFFICE VISIT (OUTPATIENT)
Dept: FAMILY MEDICINE | Facility: CLINIC | Age: 75
End: 2025-03-20
Payer: COMMERCIAL

## 2025-03-20 VITALS
DIASTOLIC BLOOD PRESSURE: 77 MMHG | OXYGEN SATURATION: 97 % | TEMPERATURE: 98.1 F | WEIGHT: 256 LBS | BODY MASS INDEX: 39.48 KG/M2 | RESPIRATION RATE: 14 BRPM | SYSTOLIC BLOOD PRESSURE: 133 MMHG | HEART RATE: 60 BPM

## 2025-03-20 DIAGNOSIS — E11.9 TYPE 2 DIABETES MELLITUS WITHOUT COMPLICATION, WITHOUT LONG-TERM CURRENT USE OF INSULIN (H): Primary | ICD-10-CM

## 2025-03-20 DIAGNOSIS — G25.81 RESTLESS LEGS SYNDROME (RLS): ICD-10-CM

## 2025-03-20 LAB
EST. AVERAGE GLUCOSE BLD GHB EST-MCNC: 169 MG/DL
FERRITIN SERPL-MCNC: 328 NG/ML (ref 31–409)
HBA1C MFR BLD: 7.5 % (ref 0–5.6)

## 2025-03-20 PROCEDURE — 82728 ASSAY OF FERRITIN: CPT | Mod: ORL | Performed by: FAMILY MEDICINE

## 2025-03-20 NOTE — PROGRESS NOTES
Jessica Méndez is a 75 year old, presenting for the following health issues:  Diabetes (Follow up - due for A1C, does not monitor blood sugar at home), Cerumen (impacted) (LEFT ear may be impacted), Wound Check (Had a boil drained about a month on upper back/neck - has become itchy, would just like this checked ), and Constipation (Taking metamucil supplements to help with this)    Via the Health Maintenance questionnaire, the patient has reported the following services have been completed -Eye Exam: [facility] 2055-02-05, this information has been sent to the abstraction team.    HPI      A1c checked    Ropinirol for restless leg - sometimes it feels like the pill makes it worse - spasms in upper thigh, ankle discomfort. Started 1mg in evening, now 0.5mg in late afternoon. Shakes leg to make discomfort go away.    Healing boil on back - itchy    Ear plugged with wax    No fevers, chills recently    Constipation - sometimes once a week. Was off metamucil and now back on. 3 times in the past week. First coming out it's hard but then normal.     No other concerns      Objective    /77   Pulse 60   Temp 98.1  F (36.7  C) (Skin)   Resp 14   Wt 116.1 kg (256 lb)   SpO2 97%   BMI 39.48 kg/m    Body mass index is 39.48 kg/m .    Physical Exam             Signed Electronically by: Behzad Pisano MD

## 2025-03-20 NOTE — NURSING NOTE
Dev  75 year old    Chief Complaint   Patient presents with    Diabetes     Follow up - due for A1C, does not monitor blood sugar at home    Cerumen (impacted)     LEFT ear may be impacted    Wound Check     Had a boil drained about a month on upper back/neck - has become itchy, would just like this checked     Constipation     Taking metamucil supplements to help with this            Blood pressure (!) 144/74, pulse 60, temperature 98.1  F (36.7  C), temperature source Skin, resp. rate 14, weight 116.1 kg (256 lb), SpO2 97%. Body mass index is 39.48 kg/m .    Patient Active Problem List   Diagnosis    Essential hypertension    Obstructive sleep apnea on CPAP    Medicare annual wellness visit, subsequent    Left bundle branch block    Morbid obesity (H)    Bilateral sensorineural hearing loss    Acute gouty arthritis    Wears hearing aid in both ears    Type 2 diabetes mellitus without complication, without long-term current use of insulin (H)    Restless arm    Secondary insomnia              Wt Readings from Last 2 Encounters:   03/20/25 116.1 kg (256 lb)   10/09/24 110.5 kg (243 lb 8 oz)       BP Readings from Last 3 Encounters:   03/20/25 (!) 144/74   10/09/24 115/73   09/25/24 131/78                Current Outpatient Medications   Medication Sig Dispense Refill    psyllium (METAMUCIL/KONSYL) Packet Take 1 packet by mouth daily.      hydrochlorothiazide (HYDRODIURIL) 25 MG tablet Take 1 tablet (25 mg) by mouth daily. 90 tablet 4    hydrocortisone (WESTCORT) 0.2 % external cream Apply sparingly to affected area three times daily for 14 days. 30 g 1    ketoconazole (NIZORAL) 2 % external cream Apply topically 2 times daily. 30 g 1    lisinopril (ZESTRIL) 5 MG tablet Take 1 tablet (5 mg) by mouth daily. 90 tablet 4    metFORMIN (GLUCOPHAGE XR) 500 MG 24 hr tablet Take 1 tablet (500 mg) by mouth 2 times daily (with meals). 180 tablet 4    omeprazole (PRILOSEC) 20 MG DR capsule Take 1 capsule (20 mg) by mouth  "daily. 90 capsule 4    propranolol (INDERAL) 40 MG tablet Take one-half tab in the AM and one-half tab in the PM. 90 tablet 4    rOPINIRole (REQUIP) 0.5 MG tablet Take 1 tablet (0.5 mg) by mouth daily as needed (Restless legs). 90 tablet 1    rOPINIRole (REQUIP) 2 MG tablet Take 1 tablet (2 mg) by mouth at bedtime. 90 tablet 4     No current facility-administered medications for this visit.              Social History     Tobacco Use    Smoking status: Every Day     Types: Cigars     Start date: 6/15/2008    Smokeless tobacco: Never    Tobacco comments:     smokes cigars, daily in afternoon   Vaping Use    Vaping status: Never Used   Substance Use Topics    Alcohol use: Yes     Alcohol/week: 7.0 standard drinks of alcohol     Types: 7 Standard drinks or equivalent per week    Drug use: No              Health Maintenance Due   Topic Date Due    ADVANCE CARE PLANNING  Never done    EYE EXAM  02/06/2025    A1C  02/15/2025            No results found for: \"PAP\"           March 20, 2025 10:29 AM  "

## 2025-03-29 ENCOUNTER — HEALTH MAINTENANCE LETTER (OUTPATIENT)
Age: 75
End: 2025-03-29

## 2025-03-31 DIAGNOSIS — G25.81 RESTLESS LEG SYNDROME: ICD-10-CM

## 2025-03-31 DIAGNOSIS — R45.1 RESTLESS ARM: ICD-10-CM

## 2025-04-01 RX ORDER — ROPINIROLE 0.5 MG/1
0.5 TABLET, FILM COATED ORAL DAILY PRN
Qty: 90 TABLET | Refills: 1 | Status: SHIPPED | OUTPATIENT
Start: 2025-04-01

## 2025-04-01 NOTE — TELEPHONE ENCOUNTER
Medication requested: rOPINIRole (REQUIP) 0.5 MG tablet   Last office visit: 3/20/2025  [facility] appointments: none  Medication last refilled: 9/23/2024; 90 tabs + 1 refill  Last qualifying labs: n/a    Prescription approved per [facility] Refill Protocol.    ALFONSO Monroe, RN  04/01/25, 4:05 PM

## 2025-04-19 NOTE — NURSING NOTE
Discharge Information    IV Discontiued Time:  NA    Amount of Fluid Infused:  NA    Discharge Criteria = When patient returns to baseline or as per MD order    Consciousness:  Pt is fully awake    Circulation:  BP +/- 20% of pre-procedure level    Respiration:  Patient is able to breathe deeply    O2 Sat:  Patient is able to maintain O2 Sat >92% on room air    Activity:  Moves 4 extremities on command    Ambulation:  Patient is able to stand and walk or stand and pivot into wheelchair    Dressing:  Clean/dry    Notes:   Discharge instructions and AVS given to patient    Patient meets criteria for discharge?  YES    Admitted to PCU?  No    Responsible adult present to accompany patient home?  Yes    Signature/Title:    Mabel Mcdaniel RN  [facility]    [facility] on Monday.

## 2025-06-04 NOTE — TELEPHONE ENCOUNTER
Medication requested: rOPINIRole (REQUIP) 0.5 MG tablet   Last office visit: 6/3/24  [facility] appointments: 7/15/24  Medication last refilled: 3/20/24; 90 + 0 refills  Last qualifying labs:   BP Readings from Last 3 Encounters:   06/03/24 133/78   04/24/24 113/73   11/06/23 (!) 146/85     Prescription approved per [facility] Refill Protocol.    Tommy HAMILTON, RN  06/18/24 9:21 AM       Name: Jay Davalos      : 1970      MRN: [id number]  Encounter Provider: Brendan Mendoza PA-C  Encounter Date: 2025   Encounter department: [facility]  :  Assessment & Plan  Elevated PSA    Orders:    PSA Total, Diagnostic; Future  His PSA has been stable  We will follow-up in 6 months with PSA prior to visit and repeat JANE      History of Present Illness   Jay Davalos is a 54 y.o. male who presents history of elevated PSA.  He had a prostate biopsy in 2023 showing 1 core of ASAP.  His highest PSA was 10.2.  Current PSA 5.7.  MRI showed PI-RADS 3 with a 48 g prostate.    Review of Systems       Objective   /72 (BP Location: Left arm, Patient Position: Sitting, Cuff Size: Standard)   Pulse 95   Wt 98.9 kg (218 lb)   SpO2 98%   BMI 28.76 kg/m²     Physical Exam GEN: no acute distress    RESP: breathing comfortably with no accessory muscle use    ABD: soft, non-tender, non-distended   INCISION:    EXT: no significant peripheral edema       RADIOLOGY:   IMPRESSION:     1. PI-RADSv2.1 Category 3 - Intermediate (the presence of clinically significant cancer is equivocal). 1.1 x 0.9 x 0.6 cm lesion in the right anterior peripheral zone at apex.  2. No extraprostatic tumor, seminal vesicle invasion, pelvic lymphadenopathy, or pelvic osseous metastatic disease.  3. Moderate BPH. Calculated prostate volume of 48 mL.     Note: Clinically significant cancer is defined on pathology/histology as Milady score greater than or equal to 7, and/or volume of greater than or equal to 0.5 mL, and/or extraprostatic extension.        Results   Lab Results   Component Value Date    PSA 5.748 (H) 2025    PSA 8.516 (H) 2024    PSA 6.7 (H) 2024     Lab Results   Component Value Date    GLUCOSE 97 10/03/2015    CALCIUM 9.4 10/21/2023     10/03/2015    K 4.0 10/21/2023    CO2 28 10/21/2023     10/21/2023    BUN 17 10/21/2023    CREATININE 0.88  10/21/2023     Lab Results   Component Value Date    WBC 5.10 07/08/2023    HGB 16.4 07/08/2023    HCT 49.5 (H) 07/08/2023    MCV 89 07/08/2023     07/08/2023       Office Urine Dip  No results found for this or any previous visit (from the past hour).

## 2025-06-13 ENCOUNTER — RESULTS FOLLOW-UP (OUTPATIENT)
Dept: FAMILY MEDICINE | Facility: CLINIC | Age: 75
End: 2025-06-13

## 2025-06-13 PROCEDURE — 84443 ASSAY THYROID STIM HORMONE: CPT | Mod: ORL | Performed by: INTERNAL MEDICINE

## 2025-06-13 PROCEDURE — 80053 COMPREHEN METABOLIC PANEL: CPT | Mod: ORL | Performed by: INTERNAL MEDICINE

## 2025-06-13 PROCEDURE — 82728 ASSAY OF FERRITIN: CPT | Mod: ORL | Performed by: INTERNAL MEDICINE

## 2025-07-29 ENCOUNTER — DOCUMENTATION ONLY (OUTPATIENT)
Dept: SLEEP MEDICINE | Facility: CLINIC | Age: 75
End: 2025-07-29
Payer: COMMERCIAL

## 2025-07-29 DIAGNOSIS — G47.33 OBSTRUCTIVE SLEEP APNEA (ADULT) (PEDIATRIC): Primary | ICD-10-CM

## 2025-07-29 NOTE — PROGRESS NOTES
Patient was offered choice of vendor and chose [facility].  Patient Dev Martinez was set up at [facility]  on July 29, 2025. Patient received a [employer]med Airsense 11 Pressures were set at 5-14 cm H2O.   Patient s ramp is 5 cm H2O for Auto and FLEX/EPR is EPR2.  Patient received a heated tubing and heated humidifier.  Patient has the following compliance requirements: using and visit requirements.   Rainer Kwok

## (undated) RX ORDER — TRIAMCINOLONE ACETONIDE 40 MG/ML
INJECTION, SUSPENSION INTRA-ARTICULAR; INTRAMUSCULAR
Status: DISPENSED
Start: 2018-02-22

## (undated) RX ORDER — LIDOCAINE HYDROCHLORIDE 10 MG/ML
INJECTION, SOLUTION EPIDURAL; INFILTRATION; INTRACAUDAL; PERINEURAL
Status: DISPENSED
Start: 2018-05-18

## (undated) RX ORDER — LIDOCAINE HYDROCHLORIDE 10 MG/ML
INJECTION, SOLUTION INFILTRATION; PERINEURAL
Status: DISPENSED
Start: 2018-02-22

## (undated) RX ORDER — TRIAMCINOLONE ACETONIDE 40 MG/ML
INJECTION, SUSPENSION INTRA-ARTICULAR; INTRAMUSCULAR
Status: DISPENSED
Start: 2018-05-18